# Patient Record
Sex: MALE | Race: OTHER | NOT HISPANIC OR LATINO | Employment: OTHER | URBAN - METROPOLITAN AREA
[De-identification: names, ages, dates, MRNs, and addresses within clinical notes are randomized per-mention and may not be internally consistent; named-entity substitution may affect disease eponyms.]

---

## 2023-03-15 LAB
APPEARANCE, URINE: ABNORMAL
BACTERIA, URINE: ABNORMAL /HPF
BILIRUBIN, URINE: NEGATIVE
BLOOD, URINE: NEGATIVE
COLOR, URINE: YELLOW
GLUCOSE, URINE: NEGATIVE MG/DL
KETONES, URINE: NEGATIVE MG/DL
LEUKOCYTE ESTERASE, URINE: ABNORMAL
MUCUS, URINE: ABNORMAL /LPF
NITRITE, URINE: POSITIVE
PH, URINE: 6 (ref 5–8)
PROTEIN, URINE: NEGATIVE MG/DL
RBC, URINE: 2 /HPF (ref 0–5)
SPECIFIC GRAVITY, URINE: 1.02 (ref 1–1.03)
SQUAMOUS EPITHELIAL CELLS, URINE: <1 /HPF
UROBILINOGEN, URINE: <2 MG/DL (ref 0–1.9)
WBC, URINE: 60 /HPF (ref 0–5)

## 2023-03-18 LAB — URINE CULTURE: ABNORMAL

## 2023-06-09 LAB
APPEARANCE, URINE: ABNORMAL
BILIRUBIN, URINE: NEGATIVE
BLOOD, URINE: ABNORMAL
COLOR, URINE: YELLOW
GLUCOSE, URINE: NEGATIVE MG/DL
KETONES, URINE: NEGATIVE MG/DL
LEUKOCYTE ESTERASE, URINE: ABNORMAL
MUCUS, URINE: ABNORMAL /LPF
NITRITE, URINE: NEGATIVE
PH, URINE: 5 (ref 5–8)
PROTEIN, URINE: NEGATIVE MG/DL
RBC, URINE: 3 /HPF (ref 0–5)
SPECIFIC GRAVITY, URINE: 1.02 (ref 1–1.03)
SQUAMOUS EPITHELIAL CELLS, URINE: 1 /HPF
UROBILINOGEN, URINE: <2 MG/DL (ref 0–1.9)
WBC, URINE: 29 /HPF (ref 0–5)

## 2023-06-11 LAB — URINE CULTURE: ABNORMAL

## 2023-08-02 LAB
ALANINE AMINOTRANSFERASE (SGPT) (U/L) IN SER/PLAS: 7 U/L (ref 10–52)
ALBUMIN (G/DL) IN SER/PLAS: 3 G/DL (ref 3.4–5)
ALKALINE PHOSPHATASE (U/L) IN SER/PLAS: 64 U/L (ref 33–136)
ANION GAP IN SER/PLAS: 14 MMOL/L (ref 10–20)
ASPARTATE AMINOTRANSFERASE (SGOT) (U/L) IN SER/PLAS: 13 U/L (ref 9–39)
BILIRUBIN TOTAL (MG/DL) IN SER/PLAS: 0.6 MG/DL (ref 0–1.2)
CALCIDIOL (25 OH VITAMIN D3) (NG/ML) IN SER/PLAS: 42 NG/ML
CALCIUM (MG/DL) IN SER/PLAS: 8.9 MG/DL (ref 8.6–10.3)
CARBON DIOXIDE, TOTAL (MMOL/L) IN SER/PLAS: 26 MMOL/L (ref 21–32)
CHLORIDE (MMOL/L) IN SER/PLAS: 103 MMOL/L (ref 98–107)
CREATININE (MG/DL) IN SER/PLAS: 1.31 MG/DL (ref 0.5–1.3)
ERYTHROCYTE DISTRIBUTION WIDTH (RATIO) BY AUTOMATED COUNT: 14.8 % (ref 11.5–14.5)
ERYTHROCYTE MEAN CORPUSCULAR HEMOGLOBIN CONCENTRATION (G/DL) BY AUTOMATED: 31.7 G/DL (ref 32–36)
ERYTHROCYTE MEAN CORPUSCULAR VOLUME (FL) BY AUTOMATED COUNT: 92 FL (ref 80–100)
ERYTHROCYTES (10*6/UL) IN BLOOD BY AUTOMATED COUNT: 4.25 X10E12/L (ref 4.5–5.9)
ESTIMATED AVERAGE GLUCOSE FOR HBA1C: 108 MG/DL
GFR MALE: 54 ML/MIN/1.73M2
GLUCOSE (MG/DL) IN SER/PLAS: 76 MG/DL (ref 74–99)
HEMATOCRIT (%) IN BLOOD BY AUTOMATED COUNT: 39.1 % (ref 41–52)
HEMOGLOBIN (G/DL) IN BLOOD: 12.4 G/DL (ref 13.5–17.5)
HEMOGLOBIN A1C/HEMOGLOBIN TOTAL IN BLOOD: 5.4 %
LEUKOCYTES (10*3/UL) IN BLOOD BY AUTOMATED COUNT: 6.4 X10E9/L (ref 4.4–11.3)
PLATELETS (10*3/UL) IN BLOOD AUTOMATED COUNT: 178 X10E9/L (ref 150–450)
POTASSIUM (MMOL/L) IN SER/PLAS: 3.5 MMOL/L (ref 3.5–5.3)
PROTEIN TOTAL: 6 G/DL (ref 6.4–8.2)
SODIUM (MMOL/L) IN SER/PLAS: 139 MMOL/L (ref 136–145)
THYROTROPIN (MIU/L) IN SER/PLAS BY DETECTION LIMIT <= 0.05 MIU/L: 0.53 MIU/L (ref 0.44–3.98)
UREA NITROGEN (MG/DL) IN SER/PLAS: 21 MG/DL (ref 6–23)

## 2024-02-01 ENCOUNTER — LAB REQUISITION (OUTPATIENT)
Dept: LAB | Facility: HOSPITAL | Age: 84
End: 2024-02-01
Payer: MEDICARE

## 2024-02-01 DIAGNOSIS — F02.80 DEMENTIA IN OTHER DISEASES CLASSIFIED ELSEWHERE, UNSPECIFIED SEVERITY, WITHOUT BEHAVIORAL DISTURBANCE, PSYCHOTIC DISTURBANCE, MOOD DISTURBANCE, AND ANXIETY (MULTI): ICD-10-CM

## 2024-02-01 LAB
25(OH)D3 SERPL-MCNC: 49 NG/ML (ref 30–100)
ALBUMIN SERPL BCP-MCNC: 3.5 G/DL (ref 3.4–5)
ALP SERPL-CCNC: 73 U/L (ref 33–136)
ALT SERPL W P-5'-P-CCNC: 11 U/L (ref 10–52)
ANION GAP SERPL CALC-SCNC: 15 MMOL/L (ref 10–20)
AST SERPL W P-5'-P-CCNC: 17 U/L (ref 9–39)
BILIRUB SERPL-MCNC: 0.7 MG/DL (ref 0–1.2)
BUN SERPL-MCNC: 23 MG/DL (ref 6–23)
CALCIUM SERPL-MCNC: 8.8 MG/DL (ref 8.6–10.3)
CHLORIDE SERPL-SCNC: 100 MMOL/L (ref 98–107)
CO2 SERPL-SCNC: 26 MMOL/L (ref 21–32)
CREAT SERPL-MCNC: 1.38 MG/DL (ref 0.5–1.3)
EGFRCR SERPLBLD CKD-EPI 2021: 50 ML/MIN/1.73M*2
ERYTHROCYTE [DISTWIDTH] IN BLOOD BY AUTOMATED COUNT: 13.3 % (ref 11.5–14.5)
EST. AVERAGE GLUCOSE BLD GHB EST-MCNC: 105 MG/DL
GLUCOSE SERPL-MCNC: 79 MG/DL (ref 74–99)
HBA1C MFR BLD: 5.3 %
HCT VFR BLD AUTO: 40.8 % (ref 41–52)
HGB BLD-MCNC: 13 G/DL (ref 13.5–17.5)
MCH RBC QN AUTO: 30.2 PG (ref 26–34)
MCHC RBC AUTO-ENTMCNC: 31.9 G/DL (ref 32–36)
MCV RBC AUTO: 95 FL (ref 80–100)
NRBC BLD-RTO: 0 /100 WBCS (ref 0–0)
PLATELET # BLD AUTO: 221 X10*3/UL (ref 150–450)
POTASSIUM SERPL-SCNC: 3.8 MMOL/L (ref 3.5–5.3)
PROT SERPL-MCNC: 6.6 G/DL (ref 6.4–8.2)
RBC # BLD AUTO: 4.31 X10*6/UL (ref 4.5–5.9)
SODIUM SERPL-SCNC: 137 MMOL/L (ref 136–145)
TSH SERPL-ACNC: 0.78 MIU/L (ref 0.44–3.98)
WBC # BLD AUTO: 8.8 X10*3/UL (ref 4.4–11.3)

## 2024-02-01 PROCEDURE — 82306 VITAMIN D 25 HYDROXY: CPT | Mod: OUT,GEALAB | Performed by: INTERNAL MEDICINE

## 2024-02-01 PROCEDURE — 85027 COMPLETE CBC AUTOMATED: CPT | Mod: OUT | Performed by: INTERNAL MEDICINE

## 2024-02-01 PROCEDURE — 83036 HEMOGLOBIN GLYCOSYLATED A1C: CPT | Mod: OUT,GEALAB | Performed by: INTERNAL MEDICINE

## 2024-02-01 PROCEDURE — 84443 ASSAY THYROID STIM HORMONE: CPT | Mod: OUT | Performed by: INTERNAL MEDICINE

## 2024-02-01 PROCEDURE — 80053 COMPREHEN METABOLIC PANEL: CPT | Mod: OUT | Performed by: INTERNAL MEDICINE

## 2024-08-02 ENCOUNTER — LAB REQUISITION (OUTPATIENT)
Dept: LAB | Facility: HOSPITAL | Age: 84
End: 2024-08-02
Payer: MEDICARE

## 2024-08-02 DIAGNOSIS — J44.9 CHRONIC OBSTRUCTIVE PULMONARY DISEASE, UNSPECIFIED (MULTI): ICD-10-CM

## 2024-08-02 DIAGNOSIS — I50.9 HEART FAILURE, UNSPECIFIED (MULTI): ICD-10-CM

## 2024-08-02 LAB
25(OH)D3 SERPL-MCNC: 38 NG/ML (ref 30–100)
ALBUMIN SERPL BCP-MCNC: 3.2 G/DL (ref 3.4–5)
ALP SERPL-CCNC: 71 U/L (ref 33–136)
ALT SERPL W P-5'-P-CCNC: 9 U/L (ref 10–52)
ANION GAP SERPL CALC-SCNC: 12 MMOL/L (ref 10–20)
AST SERPL W P-5'-P-CCNC: 13 U/L (ref 9–39)
BILIRUB SERPL-MCNC: 0.5 MG/DL (ref 0–1.2)
BUN SERPL-MCNC: 25 MG/DL (ref 6–23)
CALCIUM SERPL-MCNC: 8.5 MG/DL (ref 8.6–10.3)
CHLORIDE SERPL-SCNC: 104 MMOL/L (ref 98–107)
CO2 SERPL-SCNC: 26 MMOL/L (ref 21–32)
CREAT SERPL-MCNC: 1.5 MG/DL (ref 0.5–1.3)
EGFRCR SERPLBLD CKD-EPI 2021: 46 ML/MIN/1.73M*2
ERYTHROCYTE [DISTWIDTH] IN BLOOD BY AUTOMATED COUNT: 13.2 % (ref 11.5–14.5)
EST. AVERAGE GLUCOSE BLD GHB EST-MCNC: 103 MG/DL
GLUCOSE SERPL-MCNC: 72 MG/DL (ref 74–99)
HBA1C MFR BLD: 5.2 %
HCT VFR BLD AUTO: 39.4 % (ref 41–52)
HGB BLD-MCNC: 12.5 G/DL (ref 13.5–17.5)
MCH RBC QN AUTO: 29.6 PG (ref 26–34)
MCHC RBC AUTO-ENTMCNC: 31.7 G/DL (ref 32–36)
MCV RBC AUTO: 93 FL (ref 80–100)
NRBC BLD-RTO: 0 /100 WBCS (ref 0–0)
PLATELET # BLD AUTO: 251 X10*3/UL (ref 150–450)
POTASSIUM SERPL-SCNC: 4 MMOL/L (ref 3.5–5.3)
PROT SERPL-MCNC: 6 G/DL (ref 6.4–8.2)
RBC # BLD AUTO: 4.22 X10*6/UL (ref 4.5–5.9)
SODIUM SERPL-SCNC: 138 MMOL/L (ref 136–145)
TSH SERPL-ACNC: 0.53 MIU/L (ref 0.44–3.98)
WBC # BLD AUTO: 7 X10*3/UL (ref 4.4–11.3)

## 2024-08-02 PROCEDURE — 85027 COMPLETE CBC AUTOMATED: CPT | Mod: OUT | Performed by: INTERNAL MEDICINE

## 2024-08-02 PROCEDURE — 83036 HEMOGLOBIN GLYCOSYLATED A1C: CPT | Mod: OUT,GEALAB | Performed by: INTERNAL MEDICINE

## 2024-08-02 PROCEDURE — 80053 COMPREHEN METABOLIC PANEL: CPT | Mod: OUT | Performed by: INTERNAL MEDICINE

## 2024-08-02 PROCEDURE — 84443 ASSAY THYROID STIM HORMONE: CPT | Mod: OUT | Performed by: INTERNAL MEDICINE

## 2024-08-02 PROCEDURE — 82306 VITAMIN D 25 HYDROXY: CPT | Mod: OUT,GEALAB | Performed by: INTERNAL MEDICINE

## 2025-02-11 ENCOUNTER — LAB REQUISITION (OUTPATIENT)
Dept: LAB | Facility: HOSPITAL | Age: 85
End: 2025-02-11
Payer: MEDICARE

## 2025-02-11 DIAGNOSIS — N18.30 CHRONIC KIDNEY DISEASE, STAGE 3 UNSPECIFIED (MULTI): ICD-10-CM

## 2025-02-11 DIAGNOSIS — N40.0 BENIGN PROSTATIC HYPERPLASIA WITHOUT LOWER URINARY TRACT SYMPTOMS: ICD-10-CM

## 2025-02-11 LAB
ANION GAP SERPL CALC-SCNC: 11 MMOL/L (ref 10–20)
BUN SERPL-MCNC: 29 MG/DL (ref 6–23)
CALCIUM SERPL-MCNC: 8.3 MG/DL (ref 8.6–10.3)
CHLORIDE SERPL-SCNC: 109 MMOL/L (ref 98–107)
CO2 SERPL-SCNC: 25 MMOL/L (ref 21–32)
CREAT SERPL-MCNC: 1.4 MG/DL (ref 0.5–1.3)
EGFRCR SERPLBLD CKD-EPI 2021: 49 ML/MIN/1.73M*2
ERYTHROCYTE [DISTWIDTH] IN BLOOD BY AUTOMATED COUNT: 14.2 % (ref 11.5–14.5)
GLUCOSE SERPL-MCNC: 73 MG/DL (ref 74–99)
HCT VFR BLD AUTO: 37.6 % (ref 41–52)
HGB BLD-MCNC: 11.7 G/DL (ref 13.5–17.5)
MCH RBC QN AUTO: 29 PG (ref 26–34)
MCHC RBC AUTO-ENTMCNC: 31.1 G/DL (ref 32–36)
MCV RBC AUTO: 93 FL (ref 80–100)
NRBC BLD-RTO: 0 /100 WBCS (ref 0–0)
PLATELET # BLD AUTO: 199 X10*3/UL (ref 150–450)
POTASSIUM SERPL-SCNC: 4 MMOL/L (ref 3.5–5.3)
RBC # BLD AUTO: 4.04 X10*6/UL (ref 4.5–5.9)
SODIUM SERPL-SCNC: 141 MMOL/L (ref 136–145)
WBC # BLD AUTO: 4.8 X10*3/UL (ref 4.4–11.3)

## 2025-02-11 PROCEDURE — 36415 COLL VENOUS BLD VENIPUNCTURE: CPT | Mod: OUT | Performed by: INTERNAL MEDICINE

## 2025-02-11 PROCEDURE — 80048 BASIC METABOLIC PNL TOTAL CA: CPT | Mod: OUT | Performed by: INTERNAL MEDICINE

## 2025-02-11 PROCEDURE — 85027 COMPLETE CBC AUTOMATED: CPT | Mod: OUT | Performed by: INTERNAL MEDICINE

## 2025-02-12 ENCOUNTER — LAB REQUISITION (OUTPATIENT)
Dept: LAB | Facility: HOSPITAL | Age: 85
End: 2025-02-12
Payer: MEDICARE

## 2025-02-12 DIAGNOSIS — N39.0 URINARY TRACT INFECTION, SITE NOT SPECIFIED: ICD-10-CM

## 2025-02-12 LAB
APPEARANCE UR: ABNORMAL
BILIRUB UR STRIP.AUTO-MCNC: NEGATIVE MG/DL
COLOR UR: COLORLESS
GLUCOSE UR STRIP.AUTO-MCNC: NORMAL MG/DL
KETONES UR STRIP.AUTO-MCNC: NEGATIVE MG/DL
LEUKOCYTE ESTERASE UR QL STRIP.AUTO: ABNORMAL
MUCOUS THREADS #/AREA URNS AUTO: NORMAL /LPF
NITRITE UR QL STRIP.AUTO: NEGATIVE
PH UR STRIP.AUTO: 7 [PH]
PROT UR STRIP.AUTO-MCNC: NEGATIVE MG/DL
RBC # UR STRIP.AUTO: NEGATIVE MG/DL
RBC #/AREA URNS AUTO: NORMAL /HPF
SP GR UR STRIP.AUTO: 1.01
SQUAMOUS #/AREA URNS AUTO: NORMAL /HPF
UROBILINOGEN UR STRIP.AUTO-MCNC: NORMAL MG/DL
WBC #/AREA URNS AUTO: NORMAL /HPF
WBC CLUMPS #/AREA URNS AUTO: NORMAL /HPF

## 2025-02-12 PROCEDURE — 81001 URINALYSIS AUTO W/SCOPE: CPT

## 2025-02-12 PROCEDURE — 81001 URINALYSIS AUTO W/SCOPE: CPT | Mod: OUT | Performed by: INTERNAL MEDICINE

## 2025-02-12 PROCEDURE — 87086 URINE CULTURE/COLONY COUNT: CPT | Mod: OUT,GEALAB | Performed by: INTERNAL MEDICINE

## 2025-02-12 PROCEDURE — 87186 SC STD MICRODIL/AGAR DIL: CPT

## 2025-02-12 PROCEDURE — 87086 URINE CULTURE/COLONY COUNT: CPT

## 2025-02-14 LAB — BACTERIA UR CULT: ABNORMAL

## 2025-03-08 ENCOUNTER — APPOINTMENT (OUTPATIENT)
Dept: RADIOLOGY | Facility: HOSPITAL | Age: 85
End: 2025-03-08
Payer: MEDICARE

## 2025-03-08 ENCOUNTER — HOSPITAL ENCOUNTER (INPATIENT)
Facility: HOSPITAL | Age: 85
End: 2025-03-08
Attending: STUDENT IN AN ORGANIZED HEALTH CARE EDUCATION/TRAINING PROGRAM | Admitting: STUDENT IN AN ORGANIZED HEALTH CARE EDUCATION/TRAINING PROGRAM
Payer: MEDICARE

## 2025-03-08 DIAGNOSIS — F03.918 DEMENTIA WITH BEHAVIORAL DISTURBANCE (MULTI): ICD-10-CM

## 2025-03-08 DIAGNOSIS — B96.20 E-COLI UTI: ICD-10-CM

## 2025-03-08 DIAGNOSIS — S72.002A CLOSED FRACTURE OF NECK OF LEFT FEMUR, INITIAL ENCOUNTER: ICD-10-CM

## 2025-03-08 DIAGNOSIS — N39.0 E-COLI UTI: ICD-10-CM

## 2025-03-08 DIAGNOSIS — I50.42 CHRONIC COMBINED SYSTOLIC AND DIASTOLIC CONGESTIVE HEART FAILURE: ICD-10-CM

## 2025-03-08 DIAGNOSIS — S72.002A CLOSED LEFT HIP FRACTURE, INITIAL ENCOUNTER: Primary | ICD-10-CM

## 2025-03-08 PROBLEM — R33.9 CHRONIC RETENTION OF URINE: Status: ACTIVE | Noted: 2025-03-08

## 2025-03-08 PROBLEM — I25.10 ATHEROSCLEROSIS OF CORONARY ARTERY: Status: ACTIVE | Noted: 2025-03-08

## 2025-03-08 PROBLEM — N13.8 BENIGN PROSTATIC HYPERPLASIA WITH URINARY OBSTRUCTION: Status: ACTIVE | Noted: 2025-03-08

## 2025-03-08 PROBLEM — J44.9 CHRONIC OBSTRUCTIVE PULMONARY DISEASE (MULTI): Status: ACTIVE | Noted: 2025-03-08

## 2025-03-08 PROBLEM — R13.10 DIFFICULTY IN SWALLOWING: Status: ACTIVE | Noted: 2025-03-08

## 2025-03-08 PROBLEM — D64.9 ANEMIA: Status: ACTIVE | Noted: 2025-03-08

## 2025-03-08 PROBLEM — F41.9 ANXIETY: Status: ACTIVE | Noted: 2025-03-08

## 2025-03-08 PROBLEM — N18.32 STAGE 3B CHRONIC KIDNEY DISEASE (MULTI): Status: ACTIVE | Noted: 2021-06-28

## 2025-03-08 PROBLEM — N40.1 BENIGN PROSTATIC HYPERPLASIA WITH URINARY OBSTRUCTION: Status: ACTIVE | Noted: 2025-03-08

## 2025-03-08 PROBLEM — A41.9 SEPSIS (MULTI): Status: RESOLVED | Noted: 2025-03-08 | Resolved: 2025-03-08

## 2025-03-08 PROBLEM — K83.09 ACUTE CHOLANGITIS (CMS-HCC): Status: RESOLVED | Noted: 2020-06-07 | Resolved: 2025-03-08

## 2025-03-08 PROBLEM — J18.9 PNEUMONIA: Status: ACTIVE | Noted: 2025-03-08

## 2025-03-08 PROBLEM — E78.5 HYPERLIPIDEMIA: Status: ACTIVE | Noted: 2025-03-08

## 2025-03-08 PROBLEM — I10 BENIGN HYPERTENSION: Status: ACTIVE | Noted: 2025-03-08

## 2025-03-08 PROBLEM — K92.2 GASTROINTESTINAL HEMORRHAGE: Status: RESOLVED | Noted: 2025-03-08 | Resolved: 2025-03-08

## 2025-03-08 LAB
ALBUMIN SERPL BCP-MCNC: 2.9 G/DL (ref 3.4–5)
ALP SERPL-CCNC: 99 U/L (ref 33–136)
ALT SERPL W P-5'-P-CCNC: 48 U/L (ref 10–52)
ANION GAP SERPL CALC-SCNC: 14 MMOL/L (ref 10–20)
APPEARANCE UR: ABNORMAL
AST SERPL W P-5'-P-CCNC: 53 U/L (ref 9–39)
BACTERIA #/AREA URNS AUTO: ABNORMAL /HPF
BASOPHILS # BLD AUTO: 0.02 X10*3/UL (ref 0–0.1)
BASOPHILS NFR BLD AUTO: 0.2 %
BILIRUB SERPL-MCNC: 0.8 MG/DL (ref 0–1.2)
BILIRUB UR STRIP.AUTO-MCNC: NEGATIVE MG/DL
BUN SERPL-MCNC: 41 MG/DL (ref 6–23)
CALCIUM SERPL-MCNC: 8.7 MG/DL (ref 8.6–10.3)
CARDIAC TROPONIN I PNL SERPL HS: 45 NG/L (ref 0–20)
CARDIAC TROPONIN I PNL SERPL HS: 48 NG/L (ref 0–20)
CHLORIDE SERPL-SCNC: 100 MMOL/L (ref 98–107)
CO2 SERPL-SCNC: 26 MMOL/L (ref 21–32)
COLOR UR: ABNORMAL
CREAT SERPL-MCNC: 1.72 MG/DL (ref 0.5–1.3)
EGFRCR SERPLBLD CKD-EPI 2021: 38 ML/MIN/1.73M*2
EOSINOPHIL # BLD AUTO: 0.06 X10*3/UL (ref 0–0.4)
EOSINOPHIL NFR BLD AUTO: 0.7 %
ERYTHROCYTE [DISTWIDTH] IN BLOOD BY AUTOMATED COUNT: 13.9 % (ref 11.5–14.5)
GLUCOSE SERPL-MCNC: 107 MG/DL (ref 74–99)
GLUCOSE UR STRIP.AUTO-MCNC: NORMAL MG/DL
HCT VFR BLD AUTO: 40.8 % (ref 41–52)
HGB BLD-MCNC: 13.4 G/DL (ref 13.5–17.5)
IMM GRANULOCYTES # BLD AUTO: 0.07 X10*3/UL (ref 0–0.5)
IMM GRANULOCYTES NFR BLD AUTO: 0.8 % (ref 0–0.9)
KETONES UR STRIP.AUTO-MCNC: NEGATIVE MG/DL
LACTATE SERPL-SCNC: 1.3 MMOL/L (ref 0.4–2)
LEUKOCYTE ESTERASE UR QL STRIP.AUTO: ABNORMAL
LYMPHOCYTES # BLD AUTO: 0.71 X10*3/UL (ref 0.8–3)
LYMPHOCYTES NFR BLD AUTO: 7.7 %
MCH RBC QN AUTO: 28.5 PG (ref 26–34)
MCHC RBC AUTO-ENTMCNC: 32.8 G/DL (ref 32–36)
MCV RBC AUTO: 87 FL (ref 80–100)
MONOCYTES # BLD AUTO: 1.02 X10*3/UL (ref 0.05–0.8)
MONOCYTES NFR BLD AUTO: 11.1 %
NEUTROPHILS # BLD AUTO: 7.31 X10*3/UL (ref 1.6–5.5)
NEUTROPHILS NFR BLD AUTO: 79.5 %
NITRITE UR QL STRIP.AUTO: NEGATIVE
NRBC BLD-RTO: 0 /100 WBCS (ref 0–0)
PH UR STRIP.AUTO: 7 [PH]
PLATELET # BLD AUTO: 376 X10*3/UL (ref 150–450)
POTASSIUM SERPL-SCNC: 3.4 MMOL/L (ref 3.5–5.3)
PROT SERPL-MCNC: 6.8 G/DL (ref 6.4–8.2)
PROT UR STRIP.AUTO-MCNC: ABNORMAL MG/DL
RBC # BLD AUTO: 4.7 X10*6/UL (ref 4.5–5.9)
RBC # UR STRIP.AUTO: ABNORMAL MG/DL
RBC #/AREA URNS AUTO: ABNORMAL /HPF
SODIUM SERPL-SCNC: 137 MMOL/L (ref 136–145)
SP GR UR STRIP.AUTO: 1.02
SQUAMOUS #/AREA URNS AUTO: ABNORMAL /HPF
UROBILINOGEN UR STRIP.AUTO-MCNC: NORMAL MG/DL
WBC # BLD AUTO: 9.2 X10*3/UL (ref 4.4–11.3)
WBC #/AREA URNS AUTO: >50 /HPF
WBC CLUMPS #/AREA URNS AUTO: ABNORMAL /HPF

## 2025-03-08 PROCEDURE — 72170 X-RAY EXAM OF PELVIS: CPT

## 2025-03-08 PROCEDURE — 85025 COMPLETE CBC W/AUTO DIFF WBC: CPT | Performed by: STUDENT IN AN ORGANIZED HEALTH CARE EDUCATION/TRAINING PROGRAM

## 2025-03-08 PROCEDURE — 87449 NOS EACH ORGANISM AG IA: CPT | Mod: GEALAB | Performed by: STUDENT IN AN ORGANIZED HEALTH CARE EDUCATION/TRAINING PROGRAM

## 2025-03-08 PROCEDURE — 71045 X-RAY EXAM CHEST 1 VIEW: CPT | Performed by: RADIOLOGY

## 2025-03-08 PROCEDURE — 2500000002 HC RX 250 W HCPCS SELF ADMINISTERED DRUGS (ALT 637 FOR MEDICARE OP, ALT 636 FOR OP/ED): Performed by: STUDENT IN AN ORGANIZED HEALTH CARE EDUCATION/TRAINING PROGRAM

## 2025-03-08 PROCEDURE — 72128 CT CHEST SPINE W/O DYE: CPT | Mod: RCN | Performed by: RADIOLOGY

## 2025-03-08 PROCEDURE — 2500000004 HC RX 250 GENERAL PHARMACY W/ HCPCS (ALT 636 FOR OP/ED): Performed by: STUDENT IN AN ORGANIZED HEALTH CARE EDUCATION/TRAINING PROGRAM

## 2025-03-08 PROCEDURE — 73090 X-RAY EXAM OF FOREARM: CPT | Mod: RT

## 2025-03-08 PROCEDURE — 72125 CT NECK SPINE W/O DYE: CPT | Performed by: RADIOLOGY

## 2025-03-08 PROCEDURE — 73552 X-RAY EXAM OF FEMUR 2/>: CPT | Mod: LT

## 2025-03-08 PROCEDURE — 99285 EMERGENCY DEPT VISIT HI MDM: CPT | Mod: 25 | Performed by: STUDENT IN AN ORGANIZED HEALTH CARE EDUCATION/TRAINING PROGRAM

## 2025-03-08 PROCEDURE — 72170 X-RAY EXAM OF PELVIS: CPT | Mod: LEFT SIDE | Performed by: RADIOLOGY

## 2025-03-08 PROCEDURE — 36415 COLL VENOUS BLD VENIPUNCTURE: CPT | Performed by: STUDENT IN AN ORGANIZED HEALTH CARE EDUCATION/TRAINING PROGRAM

## 2025-03-08 PROCEDURE — 72128 CT CHEST SPINE W/O DYE: CPT | Mod: RCN

## 2025-03-08 PROCEDURE — 70450 CT HEAD/BRAIN W/O DYE: CPT

## 2025-03-08 PROCEDURE — 84484 ASSAY OF TROPONIN QUANT: CPT | Performed by: STUDENT IN AN ORGANIZED HEALTH CARE EDUCATION/TRAINING PROGRAM

## 2025-03-08 PROCEDURE — 1100000001 HC PRIVATE ROOM DAILY

## 2025-03-08 PROCEDURE — 72131 CT LUMBAR SPINE W/O DYE: CPT | Mod: RCN | Performed by: RADIOLOGY

## 2025-03-08 PROCEDURE — 2500000001 HC RX 250 WO HCPCS SELF ADMINISTERED DRUGS (ALT 637 FOR MEDICARE OP): Performed by: STUDENT IN AN ORGANIZED HEALTH CARE EDUCATION/TRAINING PROGRAM

## 2025-03-08 PROCEDURE — 99223 1ST HOSP IP/OBS HIGH 75: CPT | Performed by: STUDENT IN AN ORGANIZED HEALTH CARE EDUCATION/TRAINING PROGRAM

## 2025-03-08 PROCEDURE — 80053 COMPREHEN METABOLIC PANEL: CPT | Performed by: STUDENT IN AN ORGANIZED HEALTH CARE EDUCATION/TRAINING PROGRAM

## 2025-03-08 PROCEDURE — 73090 X-RAY EXAM OF FOREARM: CPT | Mod: RIGHT SIDE | Performed by: RADIOLOGY

## 2025-03-08 PROCEDURE — 83605 ASSAY OF LACTIC ACID: CPT | Performed by: STUDENT IN AN ORGANIZED HEALTH CARE EDUCATION/TRAINING PROGRAM

## 2025-03-08 PROCEDURE — 81003 URINALYSIS AUTO W/O SCOPE: CPT | Performed by: STUDENT IN AN ORGANIZED HEALTH CARE EDUCATION/TRAINING PROGRAM

## 2025-03-08 PROCEDURE — 72131 CT LUMBAR SPINE W/O DYE: CPT | Mod: RCN

## 2025-03-08 PROCEDURE — 87086 URINE CULTURE/COLONY COUNT: CPT | Mod: GEALAB | Performed by: STUDENT IN AN ORGANIZED HEALTH CARE EDUCATION/TRAINING PROGRAM

## 2025-03-08 PROCEDURE — 87899 AGENT NOS ASSAY W/OPTIC: CPT | Mod: GEALAB | Performed by: STUDENT IN AN ORGANIZED HEALTH CARE EDUCATION/TRAINING PROGRAM

## 2025-03-08 PROCEDURE — 70450 CT HEAD/BRAIN W/O DYE: CPT | Performed by: RADIOLOGY

## 2025-03-08 PROCEDURE — 74176 CT ABD & PELVIS W/O CONTRAST: CPT | Performed by: RADIOLOGY

## 2025-03-08 PROCEDURE — 94667 MNPJ CHEST WALL 1ST: CPT

## 2025-03-08 PROCEDURE — 9420000001 HC RT PATIENT EDUCATION 5 MIN

## 2025-03-08 PROCEDURE — 71250 CT THORAX DX C-: CPT

## 2025-03-08 PROCEDURE — 81001 URINALYSIS AUTO W/SCOPE: CPT | Performed by: STUDENT IN AN ORGANIZED HEALTH CARE EDUCATION/TRAINING PROGRAM

## 2025-03-08 PROCEDURE — 94760 N-INVAS EAR/PLS OXIMETRY 1: CPT

## 2025-03-08 PROCEDURE — 72125 CT NECK SPINE W/O DYE: CPT

## 2025-03-08 PROCEDURE — 73552 X-RAY EXAM OF FEMUR 2/>: CPT | Mod: LEFT SIDE | Performed by: RADIOLOGY

## 2025-03-08 PROCEDURE — 71045 X-RAY EXAM CHEST 1 VIEW: CPT

## 2025-03-08 PROCEDURE — 71250 CT THORAX DX C-: CPT | Performed by: RADIOLOGY

## 2025-03-08 RX ORDER — POTASSIUM CHLORIDE 750 MG/1
1 TABLET, EXTENDED RELEASE ORAL DAILY
Status: ON HOLD | COMMUNITY

## 2025-03-08 RX ORDER — ACETAMINOPHEN 325 MG/1
975 TABLET ORAL ONCE
Status: COMPLETED | OUTPATIENT
Start: 2025-03-08 | End: 2025-03-08

## 2025-03-08 RX ORDER — FINASTERIDE 5 MG/1
5 TABLET, FILM COATED ORAL DAILY
Status: DISPENSED | OUTPATIENT
Start: 2025-03-08

## 2025-03-08 RX ORDER — PANTOPRAZOLE SODIUM 40 MG/10ML
40 INJECTION, POWDER, LYOPHILIZED, FOR SOLUTION INTRAVENOUS DAILY
Status: ACTIVE | OUTPATIENT
Start: 2025-03-09

## 2025-03-08 RX ORDER — TRAZODONE HYDROCHLORIDE 50 MG/1
50 TABLET ORAL NIGHTLY
Status: DISPENSED | OUTPATIENT
Start: 2025-03-08

## 2025-03-08 RX ORDER — FLUOXETINE 10 MG/1
10 CAPSULE ORAL DAILY
Status: ON HOLD | COMMUNITY
Start: 2025-01-20

## 2025-03-08 RX ORDER — ATORVASTATIN CALCIUM 40 MG/1
40 TABLET, FILM COATED ORAL DAILY
Status: ON HOLD | COMMUNITY
Start: 2017-01-30

## 2025-03-08 RX ORDER — AMOXICILLIN 250 MG
2 CAPSULE ORAL NIGHTLY
Status: DISPENSED | OUTPATIENT
Start: 2025-03-08

## 2025-03-08 RX ORDER — ONDANSETRON HYDROCHLORIDE 2 MG/ML
4 INJECTION, SOLUTION INTRAVENOUS EVERY 6 HOURS PRN
Status: ACTIVE | OUTPATIENT
Start: 2025-03-08

## 2025-03-08 RX ORDER — FAMOTIDINE 20 MG/1
10 TABLET, FILM COATED ORAL DAILY PRN
Status: ACTIVE | OUTPATIENT
Start: 2025-03-08

## 2025-03-08 RX ORDER — HEPARIN SODIUM 5000 [USP'U]/ML
5000 INJECTION, SOLUTION INTRAVENOUS; SUBCUTANEOUS EVERY 8 HOURS
Status: COMPLETED | OUTPATIENT
Start: 2025-03-08 | End: 2025-03-09

## 2025-03-08 RX ORDER — ACETAMINOPHEN 500 MG
5 TABLET ORAL NIGHTLY PRN
Status: ACTIVE | OUTPATIENT
Start: 2025-03-08

## 2025-03-08 RX ORDER — TAMSULOSIN HYDROCHLORIDE 0.4 MG/1
0.4 CAPSULE ORAL DAILY
Status: ON HOLD | COMMUNITY

## 2025-03-08 RX ORDER — CEFTRIAXONE 2 G/50ML
2 INJECTION, SOLUTION INTRAVENOUS EVERY 24 HOURS
Status: DISPENSED | OUTPATIENT
Start: 2025-03-08

## 2025-03-08 RX ORDER — TRAZODONE HYDROCHLORIDE 50 MG/1
50 TABLET ORAL NIGHTLY
Status: ON HOLD | COMMUNITY
Start: 2025-02-24

## 2025-03-08 RX ORDER — FUROSEMIDE 40 MG/1
40 TABLET ORAL DAILY
Status: ON HOLD | COMMUNITY
Start: 2025-03-06

## 2025-03-08 RX ORDER — ACETAMINOPHEN 325 MG/1
975 TABLET ORAL 3 TIMES DAILY
Status: DISPENSED | OUTPATIENT
Start: 2025-03-08

## 2025-03-08 RX ORDER — AZITHROMYCIN 250 MG/1
500 TABLET, FILM COATED ORAL
Status: DISPENSED | OUTPATIENT
Start: 2025-03-08 | End: 2025-03-11

## 2025-03-08 RX ORDER — ATORVASTATIN CALCIUM 40 MG/1
40 TABLET, FILM COATED ORAL DAILY
Status: DISPENSED | OUTPATIENT
Start: 2025-03-08

## 2025-03-08 RX ORDER — IPRATROPIUM BROMIDE AND ALBUTEROL SULFATE 2.5; .5 MG/3ML; MG/3ML
3 SOLUTION RESPIRATORY (INHALATION) EVERY 2 HOUR PRN
Status: ACTIVE | OUTPATIENT
Start: 2025-03-08

## 2025-03-08 RX ORDER — FINASTERIDE 5 MG/1
5 TABLET, FILM COATED ORAL DAILY
Status: ON HOLD | COMMUNITY

## 2025-03-08 RX ORDER — POTASSIUM CHLORIDE 1.5 G/1.58G
40 POWDER, FOR SOLUTION ORAL ONCE
Status: COMPLETED | OUTPATIENT
Start: 2025-03-08 | End: 2025-03-08

## 2025-03-08 RX ORDER — IPRATROPIUM BROMIDE AND ALBUTEROL SULFATE 2.5; .5 MG/3ML; MG/3ML
3 SOLUTION RESPIRATORY (INHALATION) EVERY 6 HOURS PRN
Status: DISCONTINUED | OUTPATIENT
Start: 2025-03-08 | End: 2025-03-08

## 2025-03-08 RX ORDER — AMOXICILLIN 250 MG
2 CAPSULE ORAL NIGHTLY PRN
Status: DISCONTINUED | OUTPATIENT
Start: 2025-03-08 | End: 2025-03-08

## 2025-03-08 RX ORDER — FLUOXETINE 10 MG/1
10 CAPSULE ORAL DAILY
Status: DISPENSED | OUTPATIENT
Start: 2025-03-08

## 2025-03-08 RX ORDER — TAMSULOSIN HYDROCHLORIDE 0.4 MG/1
0.4 CAPSULE ORAL DAILY
Status: DISPENSED | OUTPATIENT
Start: 2025-03-08

## 2025-03-08 RX ADMIN — SENNOSIDES AND DOCUSATE SODIUM 2 TABLET: 50; 8.6 TABLET ORAL at 20:18

## 2025-03-08 RX ADMIN — FINASTERIDE 5 MG: 5 TABLET, FILM COATED ORAL at 17:02

## 2025-03-08 RX ADMIN — AZITHROMYCIN 500 MG: 250 TABLET, FILM COATED ORAL at 20:18

## 2025-03-08 RX ADMIN — ACETAMINOPHEN 975 MG: 325 TABLET ORAL at 20:18

## 2025-03-08 RX ADMIN — POTASSIUM CHLORIDE 40 MEQ: 1.5 POWDER, FOR SOLUTION ORAL at 13:19

## 2025-03-08 RX ADMIN — ATORVASTATIN CALCIUM 40 MG: 40 TABLET, FILM COATED ORAL at 20:19

## 2025-03-08 RX ADMIN — ACETAMINOPHEN 975 MG: 325 TABLET ORAL at 11:51

## 2025-03-08 RX ADMIN — SODIUM CHLORIDE, SODIUM LACTATE, POTASSIUM CHLORIDE, AND CALCIUM CHLORIDE 1000 ML: .6; .31; .03; .02 INJECTION, SOLUTION INTRAVENOUS at 17:01

## 2025-03-08 RX ADMIN — HEPARIN SODIUM 5000 UNITS: 5000 INJECTION, SOLUTION INTRAVENOUS; SUBCUTANEOUS at 18:48

## 2025-03-08 RX ADMIN — TAMSULOSIN HYDROCHLORIDE 0.4 MG: 0.4 CAPSULE ORAL at 17:02

## 2025-03-08 RX ADMIN — CEFTRIAXONE SODIUM 2 G: 2 INJECTION, SOLUTION INTRAVENOUS at 20:18

## 2025-03-08 RX ADMIN — TRAZODONE HYDROCHLORIDE 50 MG: 50 TABLET ORAL at 20:19

## 2025-03-08 SDOH — SOCIAL STABILITY: SOCIAL INSECURITY: WITHIN THE LAST YEAR, HAVE YOU BEEN HUMILIATED OR EMOTIONALLY ABUSED IN OTHER WAYS BY YOUR PARTNER OR EX-PARTNER?: NO

## 2025-03-08 SDOH — SOCIAL STABILITY: SOCIAL INSECURITY: ABUSE: ADULT

## 2025-03-08 SDOH — SOCIAL STABILITY: SOCIAL INSECURITY: HAS ANYONE EVER THREATENED TO HURT YOUR FAMILY OR YOUR PETS?: NO

## 2025-03-08 SDOH — SOCIAL STABILITY: SOCIAL INSECURITY
WITHIN THE LAST YEAR, HAVE YOU BEEN KICKED, HIT, SLAPPED, OR OTHERWISE PHYSICALLY HURT BY YOUR PARTNER OR EX-PARTNER?: NO

## 2025-03-08 SDOH — ECONOMIC STABILITY: FOOD INSECURITY: HOW HARD IS IT FOR YOU TO PAY FOR THE VERY BASICS LIKE FOOD, HOUSING, MEDICAL CARE, AND HEATING?: NOT VERY HARD

## 2025-03-08 SDOH — SOCIAL STABILITY: SOCIAL INSECURITY: WITHIN THE LAST YEAR, HAVE YOU BEEN AFRAID OF YOUR PARTNER OR EX-PARTNER?: NO

## 2025-03-08 SDOH — SOCIAL STABILITY: SOCIAL INSECURITY
WITHIN THE LAST YEAR, HAVE YOU BEEN RAPED OR FORCED TO HAVE ANY KIND OF SEXUAL ACTIVITY BY YOUR PARTNER OR EX-PARTNER?: NO

## 2025-03-08 SDOH — ECONOMIC STABILITY: FOOD INSECURITY: WITHIN THE PAST 12 MONTHS, THE FOOD YOU BOUGHT JUST DIDN'T LAST AND YOU DIDN'T HAVE MONEY TO GET MORE.: NEVER TRUE

## 2025-03-08 SDOH — SOCIAL STABILITY: SOCIAL INSECURITY: ARE YOU OR HAVE YOU BEEN THREATENED OR ABUSED PHYSICALLY, EMOTIONALLY, OR SEXUALLY BY ANYONE?: NO

## 2025-03-08 SDOH — ECONOMIC STABILITY: INCOME INSECURITY: IN THE PAST 12 MONTHS HAS THE ELECTRIC, GAS, OIL, OR WATER COMPANY THREATENED TO SHUT OFF SERVICES IN YOUR HOME?: NO

## 2025-03-08 SDOH — HEALTH STABILITY: MENTAL HEALTH
DO YOU FEEL STRESS - TENSE, RESTLESS, NERVOUS, OR ANXIOUS, OR UNABLE TO SLEEP AT NIGHT BECAUSE YOUR MIND IS TROUBLED ALL THE TIME - THESE DAYS?: NOT AT ALL

## 2025-03-08 SDOH — SOCIAL STABILITY: SOCIAL INSECURITY: WERE YOU ABLE TO COMPLETE ALL THE BEHAVIORAL HEALTH SCREENINGS?: YES

## 2025-03-08 SDOH — ECONOMIC STABILITY: FOOD INSECURITY: WITHIN THE PAST 12 MONTHS, YOU WORRIED THAT YOUR FOOD WOULD RUN OUT BEFORE YOU GOT THE MONEY TO BUY MORE.: NEVER TRUE

## 2025-03-08 SDOH — SOCIAL STABILITY: SOCIAL INSECURITY: ARE THERE ANY APPARENT SIGNS OF INJURIES/BEHAVIORS THAT COULD BE RELATED TO ABUSE/NEGLECT?: NO

## 2025-03-08 SDOH — SOCIAL STABILITY: SOCIAL INSECURITY: DO YOU FEEL ANYONE HAS EXPLOITED OR TAKEN ADVANTAGE OF YOU FINANCIALLY OR OF YOUR PERSONAL PROPERTY?: NO

## 2025-03-08 SDOH — ECONOMIC STABILITY: HOUSING INSECURITY: IN THE PAST 12 MONTHS, HOW MANY TIMES HAVE YOU MOVED WHERE YOU WERE LIVING?: 0

## 2025-03-08 SDOH — SOCIAL STABILITY: SOCIAL INSECURITY: HAVE YOU HAD THOUGHTS OF HARMING ANYONE ELSE?: NO

## 2025-03-08 SDOH — ECONOMIC STABILITY: HOUSING INSECURITY: IN THE LAST 12 MONTHS, WAS THERE A TIME WHEN YOU WERE NOT ABLE TO PAY THE MORTGAGE OR RENT ON TIME?: NO

## 2025-03-08 SDOH — ECONOMIC STABILITY: HOUSING INSECURITY: AT ANY TIME IN THE PAST 12 MONTHS, WERE YOU HOMELESS OR LIVING IN A SHELTER (INCLUDING NOW)?: NO

## 2025-03-08 SDOH — SOCIAL STABILITY: SOCIAL INSECURITY: DOES ANYONE TRY TO KEEP YOU FROM HAVING/CONTACTING OTHER FRIENDS OR DOING THINGS OUTSIDE YOUR HOME?: NO

## 2025-03-08 SDOH — SOCIAL STABILITY: SOCIAL INSECURITY: HAVE YOU HAD ANY THOUGHTS OF HARMING ANYONE ELSE?: NO

## 2025-03-08 SDOH — SOCIAL STABILITY: SOCIAL INSECURITY: DO YOU FEEL UNSAFE GOING BACK TO THE PLACE WHERE YOU ARE LIVING?: NO

## 2025-03-08 SDOH — ECONOMIC STABILITY: TRANSPORTATION INSECURITY: IN THE PAST 12 MONTHS, HAS LACK OF TRANSPORTATION KEPT YOU FROM MEDICAL APPOINTMENTS OR FROM GETTING MEDICATIONS?: NO

## 2025-03-08 ASSESSMENT — COGNITIVE AND FUNCTIONAL STATUS - GENERAL
TURNING FROM BACK TO SIDE WHILE IN FLAT BAD: A LOT
STANDING UP FROM CHAIR USING ARMS: A LOT
DRESSING REGULAR LOWER BODY CLOTHING: TOTAL
STANDING UP FROM CHAIR USING ARMS: TOTAL
CLIMB 3 TO 5 STEPS WITH RAILING: A LOT
MOVING TO AND FROM BED TO CHAIR: A LOT
DRESSING REGULAR UPPER BODY CLOTHING: TOTAL
PATIENT BASELINE BEDBOUND: NO
MOVING FROM LYING ON BACK TO SITTING ON SIDE OF FLAT BED WITH BEDRAILS: A LOT
EATING MEALS: A LOT
DRESSING REGULAR UPPER BODY CLOTHING: A LOT
CLIMB 3 TO 5 STEPS WITH RAILING: TOTAL
MOBILITY SCORE: 8
TOILETING: TOTAL
EATING MEALS: A LOT
HELP NEEDED FOR BATHING: A LOT
PERSONAL GROOMING: A LOT
MOBILITY SCORE: 12
MOVING TO AND FROM BED TO CHAIR: TOTAL
DAILY ACTIVITIY SCORE: 12
PERSONAL GROOMING: A LOT
DAILY ACTIVITIY SCORE: 8
WALKING IN HOSPITAL ROOM: A LOT
HELP NEEDED FOR BATHING: TOTAL
TOILETING: A LOT
DRESSING REGULAR LOWER BODY CLOTHING: A LOT
TURNING FROM BACK TO SIDE WHILE IN FLAT BAD: A LOT
WALKING IN HOSPITAL ROOM: TOTAL
MOVING FROM LYING ON BACK TO SITTING ON SIDE OF FLAT BED WITH BEDRAILS: A LOT

## 2025-03-08 ASSESSMENT — PAIN SCALES - PAIN ASSESSMENT IN ADVANCED DEMENTIA (PAINAD)
FACIALEXPRESSION: SMILING OR INEXPRESSIVE
BODYLANGUAGE: RELAXED
CONSOLABILITY: NO NEED TO CONSOLE
TOTALSCORE: 0
BREATHING: NORMAL

## 2025-03-08 ASSESSMENT — ACTIVITIES OF DAILY LIVING (ADL)
WALKS IN HOME: NEEDS ASSISTANCE
DRESSING YOURSELF: NEEDS ASSISTANCE
LACK_OF_TRANSPORTATION: NO
HEARING - LEFT EAR: FUNCTIONAL
FEEDING YOURSELF: NEEDS ASSISTANCE
LACK_OF_TRANSPORTATION: PATIENT UNABLE TO ANSWER
ASSISTIVE_DEVICE: WHEELCHAIR
LACK_OF_TRANSPORTATION: NO
ADEQUATE_TO_COMPLETE_ADL: YES
GROOMING: NEEDS ASSISTANCE
BATHING: NEEDS ASSISTANCE
TOILETING: NEEDS ASSISTANCE
JUDGMENT_ADEQUATE_SAFELY_COMPLETE_DAILY_ACTIVITIES: NO
HEARING - RIGHT EAR: FUNCTIONAL
PATIENT'S MEMORY ADEQUATE TO SAFELY COMPLETE DAILY ACTIVITIES?: NO

## 2025-03-08 ASSESSMENT — LIFESTYLE VARIABLES
HOW OFTEN DO YOU HAVE 6 OR MORE DRINKS ON ONE OCCASION: NEVER
AUDIT-C TOTAL SCORE: 0
SKIP TO QUESTIONS 9-10: 1
HOW OFTEN DO YOU HAVE A DRINK CONTAINING ALCOHOL: NEVER
AUDIT-C TOTAL SCORE: 0
HOW MANY STANDARD DRINKS CONTAINING ALCOHOL DO YOU HAVE ON A TYPICAL DAY: PATIENT DOES NOT DRINK

## 2025-03-08 ASSESSMENT — PAIN - FUNCTIONAL ASSESSMENT
PAIN_FUNCTIONAL_ASSESSMENT: UNABLE TO SELF-REPORT
PAIN_FUNCTIONAL_ASSESSMENT: UNABLE TO SELF-REPORT

## 2025-03-08 ASSESSMENT — PATIENT HEALTH QUESTIONNAIRE - PHQ9
2. FEELING DOWN, DEPRESSED OR HOPELESS: NOT AT ALL
1. LITTLE INTEREST OR PLEASURE IN DOING THINGS: NOT AT ALL
SUM OF ALL RESPONSES TO PHQ9 QUESTIONS 1 & 2: 0

## 2025-03-08 NOTE — ED PROVIDER NOTES
CC: No chief complaint on file.     HPI:  Patient is a 84-year-old male from the memory care unit at [facility] presenting the emergency department with sudden onset of left lower leg pain. He is alert and oriented times himself which is at baseline.  He is full code.  There denies any injury or trauma per EMS.  Did not fall did not hit his head.  Patient is on tramadol for chronic pain per chart review.      Brother states he has been having pain for 7-10 days per NH but he saw him Saturday and he seemed fine.  Brother states that he has fallen multiple times most recently in the last 6 weeks where he fell and hit his head out of the wheelchair.  He was not evaluated at the emergency department at that time.    Records Reviewed:  Recent available ED and inpatient notes reviewed in EMR.    PMHx/PSHx:  Per HPI.   - has no past medical history on file.  - has a past surgical history that includes CT guided imaging for needle placement (12/7/2017).  - does not have a problem list on file.    Medications:  Reviewed in EMR. See EMR for complete list of medications and doses.    Allergies:  Patient has no known allergies.    Social History:  - Tobacco:  has no history on file for tobacco use.   - Alcohol:  has no history on file for alcohol use.   - Illicit Drugs:  has no history on file for drug use.     ROS:  Per HPI.       ???????????????????????????????????????????????????????????????  Triage Vitals:  T    HR    BP    RR    O2        Physical Exam  ???????????????????????????????????????????????????????????????  GEN: in acute distress  HEAD: atraumatic  EYES: PERRL, EOMI, no scleral icterus  ENT: mmm, no rhinorrhea, uvula midline  NECK: no midline C-spine tenderness  CVS/CHEST: reg rate, nl rhythm  PULM: CTA b/l no wheezes, crackles, or rhonchi   GI: soft, NT/ND, no rebound or guarding   BACK: no vertebral point tenderness  EXT: Left lower extremity is shortened and externally rotated, patient does  have pain with palpation of left hip, 2+ periph pulses in bilat radial and DP   NEURO: Awake and alert to self at baseline.  Moving extremities spontaneously.  SKIN: warm, dry, excoriations in the genitals.      Assessment and Plan:  Patient is a 84-year-old male from the memory care unit at [facility] presenting the emergency department with sudden onset of left lower leg pain.  It appears that patient had duplex lower extremity arterial and venous scans ordered as well as x-rays.  I cannot see the results in our system.  However patient is neurovascularly intact.  He is alert and oriented times himself which is at baseline.  He is full code.  There denies any injury or trauma per EMS.  Did not fall did not hit his head.  Patient is on tramadol for chronic pain per chart review.  He is not on anticoagulation.  He does take a daily baby aspirin.  Patient's left leg does appear shortened and externally rotated.  Concern for fracture.  He is neurovascularly intact and I have a low suspicion for acute arterial thrombus or DVT based on physical exam.  There is no swelling.  There is no redness.  There is no warmth.  Will start with x-rays and advanced workup as needed.  Will give acetaminophen for pain relief.  Awaiting for brother to arrive for further history taking.  As history is significantly limited given his advanced dementia.    Given the history of falls by his brother at bedside and inability to obtain a reliable exam will pan scan.  Confirmed CODE STATUS with patient's brother at bedside he is full code.    See ED course.    Admitted to medicine with orthopedics on consult for fixation of his femoral neck fracture.    ED Course:  ED Course as of 03/09/25 0926   Sat Mar 08, 2025   1233 X-ray reviewed by me consistent with left femur fracture.  Reach out to Dr. Buckley who will place on consult.  Patient will require admission for operative repair. [HD]   1249 Dr. Marcio rivera with consultation.   Patient will be admitted to medicine for medical clearance in OR tomorrow.  N.p.o. at midnight. [HD]   1250 IMPRESSION:  Displaced subcapital fracture of the left femoral neck.   [HD]   1406 IMPRESSION:  No evidence of an acute intracranial process.   [HD]   1407 Within limitations of imaging no other acute fractures identified.   [HD]      ED Course User Index  [HD] Meredith Fang DO         Diagnoses as of 03/09/25 0926   Closed fracture of neck of left femur, initial encounter   Closed left hip fracture, initial encounter       Disposition:  admitted    Meredith Fang DO      Procedures ? SmartLinks last updated 3/8/2025 11:20 AM        Meredith Fang DO  03/09/25 0928

## 2025-03-08 NOTE — CARE PLAN
The patient's goals for the shift include  pain management     The clinical goals for the shift include  pain management

## 2025-03-08 NOTE — H&P
[facility]  Department of Hospital Medicine    HISTORY AND PHYSICAL    Chief Complaint   Patient presents with    Leg Pain     Pt sent in from NH with C/O left leg pain. On arrival Pt has left leg shorting and outward rotation with pain on movement.        History Of Present Illness  Jabari Dumas is an 84 y.o. male with dementia, sick sinus syndrome status post PPM, hypertension, hyperlipidemia, CAD status post CABG 2017, chronic diastolic heart failure, COPD, CKD 3A, BPH with history of urinary retention, GERD and PUD complicated by remote GI bleed.  He presented to the [facility] ED from his memory care unit due to sudden onset left leg pain.  Family reported he has had multiple recent falls.  Vitals in the ED showed hypotension and hypoxia.  He was noted to have a shortened, externally rotated left leg.  Labs showed improvement in chronic anemia, ARAM, hypokalemia, and elevated troponin stable on repeat.  Full body motion-degraded CT imaging showed bilateral lower lobe pneumonia with mucous plugging, displaced left femoral neck fracture, possible pelvic fractures, possible distal right radius fracture as well as several incidental findings including various cysts, thyroid enlargement, esophageal fluid, hiatal hernia, staghorn calculus in the left kidney, and a bladder stone.     At time of admission, denies pain.  Per family at bedside, unlikely to get a meaningful history from him.  Has had recent falls.  Was having left leg pain and had a workup at the nursing facility 1 week ago including x-ray, which did not show any fracture at that time.     Past Medical History  He has no past medical history on file.    Surgical History  He has a past surgical history that includes CT guided imaging for needle placement (12/7/2017).     Social History  He has no history on file for tobacco use, alcohol use, and drug use.    Family History  No family history on file.      Allergies  Patient has no known allergies.    Review of systems  Limited by mentation.    Physical Exam   CON: awake, alert, moderate distress;   EYES: conjunctiva wnl; PERRL; EOMI  ENMT: hearing intact; dry mouth;   NECK: symmetric; thyroid and cervical nodes wnl;   CV: S1 S2 - RRR with no m/g/r; no lower extremity edema; normal JVP; symmetric pulses  RESP: normal work of breathing on room air; lungs CTAB;   GI: abdomen nontender; no organomegaly;   SKIN: no lesions or rashes; no induration;   MSK: Left leg shortened and externally rotated; left hip tender to palpation; digits wnl;   NEURO: language and speech wnl; sensation and motor function grossly intact   PSYCH: disoriented to situation; affect pleasant;     Last Recorded Vitals  Blood pressure 118/58, pulse 85, temperature 36.5 °C (97.7 °F), temperature source Temporal, resp. rate 18, height 1.829 m (6'), weight 79.4 kg (175 lb), SpO2 (!) 86%.    Relevant Results  Lab Results   Component Value Date    WBC 9.2 03/08/2025    HGB 13.4 (L) 03/08/2025    HCT 40.8 (L) 03/08/2025    MCV 87 03/08/2025     03/08/2025      Lab Results   Component Value Date    GLUCOSE 107 (H) 03/08/2025    CALCIUM 8.7 03/08/2025     03/08/2025    K 3.4 (L) 03/08/2025    CO2 26 03/08/2025     03/08/2025    BUN 41 (H) 03/08/2025    CREATININE 1.72 (H) 03/08/2025        Scheduled medications:  heparin, 5,000 Units, subcutaneous, q8h      Continuous medications:     PRN medications:  PRN medications: famotidine, melatonin, ondansetron, oxygen, sennosides-docusate sodium                Assessment/Plan   Assessment & Plan  Closed left hip fracture, initial encounter    Chronic retention of urine    Stage 3b chronic kidney disease (Multi)    Pneumonia    Peptic ulcer disease    Benign hypertension    Hyperlipidemia    Dementia with behavioral disturbance (Multi)    Difficulty in swallowing    Chronic obstructive pulmonary disease (Multi)    Chronic combined systolic and  diastolic congestive heart failure    Benign prostatic hyperplasia with urinary obstruction    Atherosclerosis of coronary artery    Anemia    Jabari Dumas is an 84 y.o. male with dementia, sick sinus syndrome status post PPM, hypertension, hyperlipidemia, CAD status post CABG 2017, chronic diastolic heart failure, COPD, CKD 3A, BPH with history of urinary retention, GERD and PUD complicated by remote GI bleed.  He presented to the [facility] ED from his memory care unit due to sudden onset left leg pain.  Family reported he has had multiple recent falls.  Vitals in the ED showed hypotension and hypoxia.  He was noted to have a shortened, externally rotated left leg.  Labs showed improvement in chronic anemia, ARAM, hypokalemia, and elevated troponin stable on repeat.  Full body motion-degraded CT imaging showed bilateral lower lobe pneumonia with mucous plugging, displaced left femoral neck fracture, possible pelvic fractures, possible distal right radius fracture as well as several incidental findings including various cysts, thyroid enlargement, esophageal fluid, hiatal hernia, staghorn calculus in the left kidney, and a bladder stone.     Acute displaced left femoral neck fracture:  Non-MI troponin elevation: Due to above  > RCRI = 2 (10 % risk of major cardiac event, for prior MI and CHF; per review of incomplete records, likely had a type II MI at the time of his GI bleed and does have elevated troponin on admission). Unable to perform > 4 METS.  Echo in 2022 with normal EF, diastolic dysfunction, no significant valve abnormalities. Pt is moderate risk for moderate risk surgery. Pt is optimized for surgery, although will reach out to cardiology for input as well given cardiac history.   -orthopedics consulted  -pain mgmt and bowel regimen   -PT/OT   -cardiology consulted    Bibasilar pneumonia: Suspicious for aspiration pneumonia  COPD:  -Ceftriaxone and azithromycin to cover gram-negative  pneumonia  -Respiratory culture, Legionella and strep antigens, procalcitonin pending  -Bronchial hygiene, incentive spirometry, DuoNebs  -SLP consulted  -Oxygen, wean as tolerated    ARAM on CKD 3A:  Chronic diastolic CHF: Appears dry to euvolemic  Hypokalemia:  -Hold home Lasix  -Cautious IV hydration; monitor volume status  -Trend renal function and electrolytes; replace potassium    CAD:  Hyperlipidemia:  -Continue statin perioperatively    Dementia:  -Continue fluoxetine, trazodone    BPH with history of urinary retention:  Multiple asymptomatic renal calculi:  -Continue finasteride, tamsulosin  -Urinalysis pending  -Bladder scans to assess for urinary retention    GERD:  History of PUD:  Esophageal reflux and hiatal hernia on CT:  -PPI    Anemia:  -Trend CBC    DVT PPx: Subcu heparin; hold morning of surgery    Dispo: Inpatient, likely 3 to 4 days anticipating need for skilled placement postoperatively         Greg Singh MD    Patient Name:  Jabari Dumas   MRN:   [id number]   Room/Bed:  [facility]

## 2025-03-09 ENCOUNTER — APPOINTMENT (OUTPATIENT)
Dept: RADIOLOGY | Facility: HOSPITAL | Age: 85
End: 2025-03-09
Payer: MEDICARE

## 2025-03-09 ENCOUNTER — ANESTHESIA EVENT (OUTPATIENT)
Dept: OPERATING ROOM | Facility: HOSPITAL | Age: 85
End: 2025-03-09
Payer: MEDICARE

## 2025-03-09 ENCOUNTER — ANESTHESIA (OUTPATIENT)
Dept: OPERATING ROOM | Facility: HOSPITAL | Age: 85
End: 2025-03-09
Payer: MEDICARE

## 2025-03-09 VITALS
HEART RATE: 67 BPM | HEIGHT: 72 IN | RESPIRATION RATE: 15 BRPM | BODY MASS INDEX: 26.07 KG/M2 | OXYGEN SATURATION: 95 % | WEIGHT: 192.46 LBS | TEMPERATURE: 97.3 F | SYSTOLIC BLOOD PRESSURE: 121 MMHG | DIASTOLIC BLOOD PRESSURE: 59 MMHG

## 2025-03-09 PROBLEM — S72.002A CLOSED FRACTURE OF NECK OF LEFT FEMUR: Status: ACTIVE | Noted: 2025-03-08

## 2025-03-09 LAB
ABO GROUP (TYPE) IN BLOOD: NORMAL
ALBUMIN SERPL BCP-MCNC: 2.6 G/DL (ref 3.4–5)
ANION GAP SERPL CALC-SCNC: 11 MMOL/L (ref 10–20)
ANTIBODY SCREEN: NORMAL
BUN SERPL-MCNC: 36 MG/DL (ref 6–23)
CALCIUM SERPL-MCNC: 8.5 MG/DL (ref 8.6–10.3)
CARDIAC TROPONIN I PNL SERPL HS: 41 NG/L (ref 0–20)
CHLORIDE SERPL-SCNC: 103 MMOL/L (ref 98–107)
CO2 SERPL-SCNC: 26 MMOL/L (ref 21–32)
CREAT SERPL-MCNC: 1.48 MG/DL (ref 0.5–1.3)
EGFRCR SERPLBLD CKD-EPI 2021: 46 ML/MIN/1.73M*2
ERYTHROCYTE [DISTWIDTH] IN BLOOD BY AUTOMATED COUNT: 14 % (ref 11.5–14.5)
GLUCOSE SERPL-MCNC: 105 MG/DL (ref 74–99)
HCT VFR BLD AUTO: 35.2 % (ref 41–52)
HGB BLD-MCNC: 12 G/DL (ref 13.5–17.5)
HOLD SPECIMEN: NORMAL
INR PPP: 1.2 (ref 0.9–1.1)
MAGNESIUM SERPL-MCNC: 2.16 MG/DL (ref 1.6–2.4)
MCH RBC QN AUTO: 28.8 PG (ref 26–34)
MCHC RBC AUTO-ENTMCNC: 34.1 G/DL (ref 32–36)
MCV RBC AUTO: 85 FL (ref 80–100)
NRBC BLD-RTO: 0 /100 WBCS (ref 0–0)
PHOSPHATE SERPL-MCNC: 3.5 MG/DL (ref 2.5–4.9)
PLATELET # BLD AUTO: 344 X10*3/UL (ref 150–450)
POTASSIUM SERPL-SCNC: 3.5 MMOL/L (ref 3.5–5.3)
PROCALCITONIN SERPL-MCNC: 0.11 NG/ML
PROTHROMBIN TIME: 13.2 SECONDS (ref 9.8–12.4)
RBC # BLD AUTO: 4.16 X10*6/UL (ref 4.5–5.9)
RH FACTOR (ANTIGEN D): NORMAL
SODIUM SERPL-SCNC: 136 MMOL/L (ref 136–145)
WBC # BLD AUTO: 8.4 X10*3/UL (ref 4.4–11.3)

## 2025-03-09 PROCEDURE — A9999 DME SUPPLY OR ACCESSORY, NOS: HCPCS | Performed by: ORTHOPAEDIC SURGERY

## 2025-03-09 PROCEDURE — 84145 PROCALCITONIN (PCT): CPT | Mod: GEALAB | Performed by: STUDENT IN AN ORGANIZED HEALTH CARE EDUCATION/TRAINING PROGRAM

## 2025-03-09 PROCEDURE — 3700000002 HC GENERAL ANESTHESIA TIME - EACH INCREMENTAL 1 MINUTE: Performed by: ORTHOPAEDIC SURGERY

## 2025-03-09 PROCEDURE — 2720000007 HC OR 272 NO HCPCS: Performed by: ORTHOPAEDIC SURGERY

## 2025-03-09 PROCEDURE — 36415 COLL VENOUS BLD VENIPUNCTURE: CPT | Performed by: STUDENT IN AN ORGANIZED HEALTH CARE EDUCATION/TRAINING PROGRAM

## 2025-03-09 PROCEDURE — 3600000005 HC OR TIME - INITIAL BASE CHARGE - PROCEDURE LEVEL FIVE: Performed by: ORTHOPAEDIC SURGERY

## 2025-03-09 PROCEDURE — P9045 ALBUMIN (HUMAN), 5%, 250 ML: HCPCS | Mod: JZ | Performed by: NURSE ANESTHETIST, CERTIFIED REGISTERED

## 2025-03-09 PROCEDURE — 2500000004 HC RX 250 GENERAL PHARMACY W/ HCPCS (ALT 636 FOR OP/ED): Performed by: ANESTHESIOLOGY

## 2025-03-09 PROCEDURE — 0SRS01Z REPLACEMENT OF LEFT HIP JOINT, FEMORAL SURFACE WITH METAL SYNTHETIC SUBSTITUTE, OPEN APPROACH: ICD-10-PCS | Performed by: ORTHOPAEDIC SURGERY

## 2025-03-09 PROCEDURE — 76000 FLUOROSCOPY <1 HR PHYS/QHP: CPT

## 2025-03-09 PROCEDURE — A27236 PR FEMORAL FX, OPEN TX: Performed by: NURSE ANESTHETIST, CERTIFIED REGISTERED

## 2025-03-09 PROCEDURE — 87077 CULTURE AEROBIC IDENTIFY: CPT | Mod: GEALAB | Performed by: STUDENT IN AN ORGANIZED HEALTH CARE EDUCATION/TRAINING PROGRAM

## 2025-03-09 PROCEDURE — 80069 RENAL FUNCTION PANEL: CPT | Performed by: STUDENT IN AN ORGANIZED HEALTH CARE EDUCATION/TRAINING PROGRAM

## 2025-03-09 PROCEDURE — 1100000001 HC PRIVATE ROOM DAILY

## 2025-03-09 PROCEDURE — 2780000003 HC OR 278 NO HCPCS: Performed by: ORTHOPAEDIC SURGERY

## 2025-03-09 PROCEDURE — 2500000004 HC RX 250 GENERAL PHARMACY W/ HCPCS (ALT 636 FOR OP/ED): Performed by: STUDENT IN AN ORGANIZED HEALTH CARE EDUCATION/TRAINING PROGRAM

## 2025-03-09 PROCEDURE — 3600000010 HC OR TIME - EACH INCREMENTAL 1 MINUTE - PROCEDURE LEVEL FIVE: Performed by: ORTHOPAEDIC SURGERY

## 2025-03-09 PROCEDURE — 2500000005 HC RX 250 GENERAL PHARMACY W/O HCPCS: Performed by: ORTHOPAEDIC SURGERY

## 2025-03-09 PROCEDURE — 84100 ASSAY OF PHOSPHORUS: CPT | Performed by: STUDENT IN AN ORGANIZED HEALTH CARE EDUCATION/TRAINING PROGRAM

## 2025-03-09 PROCEDURE — A6213 FOAM DRG >16<=48 SQ IN W/BDR: HCPCS | Performed by: ORTHOPAEDIC SURGERY

## 2025-03-09 PROCEDURE — 84484 ASSAY OF TROPONIN QUANT: CPT | Performed by: STUDENT IN AN ORGANIZED HEALTH CARE EDUCATION/TRAINING PROGRAM

## 2025-03-09 PROCEDURE — 99223 1ST HOSP IP/OBS HIGH 75: CPT | Performed by: ORTHOPAEDIC SURGERY

## 2025-03-09 PROCEDURE — 86900 BLOOD TYPING SEROLOGIC ABO: CPT | Performed by: STUDENT IN AN ORGANIZED HEALTH CARE EDUCATION/TRAINING PROGRAM

## 2025-03-09 PROCEDURE — 72170 X-RAY EXAM OF PELVIS: CPT | Performed by: RADIOLOGY

## 2025-03-09 PROCEDURE — 3700000001 HC GENERAL ANESTHESIA TIME - INITIAL BASE CHARGE: Performed by: ORTHOPAEDIC SURGERY

## 2025-03-09 PROCEDURE — 99100 ANES PT EXTEME AGE<1 YR&>70: CPT | Performed by: ANESTHESIOLOGY

## 2025-03-09 PROCEDURE — 27236 TREAT THIGH FRACTURE: CPT | Performed by: ORTHOPAEDIC SURGERY

## 2025-03-09 PROCEDURE — 2500000004 HC RX 250 GENERAL PHARMACY W/ HCPCS (ALT 636 FOR OP/ED): Performed by: ORTHOPAEDIC SURGERY

## 2025-03-09 PROCEDURE — 85027 COMPLETE CBC AUTOMATED: CPT | Performed by: STUDENT IN AN ORGANIZED HEALTH CARE EDUCATION/TRAINING PROGRAM

## 2025-03-09 PROCEDURE — A27236 PR FEMORAL FX, OPEN TX: Performed by: ANESTHESIOLOGY

## 2025-03-09 PROCEDURE — 72170 X-RAY EXAM OF PELVIS: CPT

## 2025-03-09 PROCEDURE — 2500000001 HC RX 250 WO HCPCS SELF ADMINISTERED DRUGS (ALT 637 FOR MEDICARE OP): Performed by: ORTHOPAEDIC SURGERY

## 2025-03-09 PROCEDURE — 2500000004 HC RX 250 GENERAL PHARMACY W/ HCPCS (ALT 636 FOR OP/ED): Performed by: NURSE ANESTHETIST, CERTIFIED REGISTERED

## 2025-03-09 PROCEDURE — C1776 JOINT DEVICE (IMPLANTABLE): HCPCS | Performed by: ORTHOPAEDIC SURGERY

## 2025-03-09 PROCEDURE — 83735 ASSAY OF MAGNESIUM: CPT | Performed by: STUDENT IN AN ORGANIZED HEALTH CARE EDUCATION/TRAINING PROGRAM

## 2025-03-09 PROCEDURE — 7100000001 HC RECOVERY ROOM TIME - INITIAL BASE CHARGE: Performed by: ORTHOPAEDIC SURGERY

## 2025-03-09 PROCEDURE — 85610 PROTHROMBIN TIME: CPT | Performed by: STUDENT IN AN ORGANIZED HEALTH CARE EDUCATION/TRAINING PROGRAM

## 2025-03-09 PROCEDURE — 7100000002 HC RECOVERY ROOM TIME - EACH INCREMENTAL 1 MINUTE: Performed by: ORTHOPAEDIC SURGERY

## 2025-03-09 DEVICE — BIPOLAR COMPONENT
Type: IMPLANTABLE DEVICE | Site: HIP | Status: FUNCTIONAL
Brand: UHR

## 2025-03-09 DEVICE — V40 FEMORAL HEAD
Type: IMPLANTABLE DEVICE | Site: HIP | Status: FUNCTIONAL
Brand: V40 HEAD

## 2025-03-09 DEVICE — HIP STEM - STANDARD OFFSET
Type: IMPLANTABLE DEVICE | Site: HIP | Status: FUNCTIONAL
Brand: INSIGNIA

## 2025-03-09 RX ORDER — ROCURONIUM BROMIDE 10 MG/ML
INJECTION, SOLUTION INTRAVENOUS AS NEEDED
Status: DISCONTINUED | OUTPATIENT
Start: 2025-03-09 | End: 2025-03-09

## 2025-03-09 RX ORDER — ONDANSETRON HYDROCHLORIDE 2 MG/ML
4 INJECTION, SOLUTION INTRAVENOUS ONCE AS NEEDED
Status: COMPLETED | OUTPATIENT
Start: 2025-03-09 | End: 2025-03-09

## 2025-03-09 RX ORDER — PHENYLEPHRINE HCL IN 0.9% NACL 0.4MG/10ML
SYRINGE (ML) INTRAVENOUS AS NEEDED
Status: DISCONTINUED | OUTPATIENT
Start: 2025-03-09 | End: 2025-03-09

## 2025-03-09 RX ORDER — SODIUM CHLORIDE, SODIUM LACTATE, POTASSIUM CHLORIDE, CALCIUM CHLORIDE 600; 310; 30; 20 MG/100ML; MG/100ML; MG/100ML; MG/100ML
INJECTION, SOLUTION INTRAVENOUS CONTINUOUS PRN
Status: DISCONTINUED | OUTPATIENT
Start: 2025-03-09 | End: 2025-03-09

## 2025-03-09 RX ORDER — LIDOCAINE HYDROCHLORIDE 20 MG/ML
INJECTION, SOLUTION INFILTRATION; PERINEURAL AS NEEDED
Status: DISCONTINUED | OUTPATIENT
Start: 2025-03-09 | End: 2025-03-09

## 2025-03-09 RX ORDER — DIPHENHYDRAMINE HYDROCHLORIDE 50 MG/ML
12.5 INJECTION INTRAMUSCULAR; INTRAVENOUS ONCE AS NEEDED
Status: DISCONTINUED | OUTPATIENT
Start: 2025-03-09 | End: 2025-03-09 | Stop reason: HOSPADM

## 2025-03-09 RX ORDER — SODIUM CHLORIDE 0.9 G/100ML
INJECTION, SOLUTION IRRIGATION AS NEEDED
Status: DISCONTINUED | OUTPATIENT
Start: 2025-03-09 | End: 2025-03-09 | Stop reason: HOSPADM

## 2025-03-09 RX ORDER — ACETAMINOPHEN 10 MG/ML
1000 INJECTION, SOLUTION INTRAVENOUS ONCE
Status: COMPLETED | OUTPATIENT
Start: 2025-03-09 | End: 2025-03-09

## 2025-03-09 RX ORDER — MEPERIDINE HYDROCHLORIDE 25 MG/ML
12.5 INJECTION INTRAMUSCULAR; INTRAVENOUS; SUBCUTANEOUS EVERY 10 MIN PRN
Status: DISCONTINUED | OUTPATIENT
Start: 2025-03-09 | End: 2025-03-09 | Stop reason: HOSPADM

## 2025-03-09 RX ORDER — CEFAZOLIN 1 G/1
INJECTION, POWDER, FOR SOLUTION INTRAVENOUS AS NEEDED
Status: DISCONTINUED | OUTPATIENT
Start: 2025-03-09 | End: 2025-03-09

## 2025-03-09 RX ORDER — OXYCODONE HYDROCHLORIDE 5 MG/1
5 TABLET ORAL EVERY 4 HOURS PRN
Status: DISCONTINUED | OUTPATIENT
Start: 2025-03-09 | End: 2025-03-09 | Stop reason: HOSPADM

## 2025-03-09 RX ORDER — DROPERIDOL 2.5 MG/ML
0.62 INJECTION, SOLUTION INTRAMUSCULAR; INTRAVENOUS ONCE AS NEEDED
Status: DISCONTINUED | OUTPATIENT
Start: 2025-03-09 | End: 2025-03-09 | Stop reason: HOSPADM

## 2025-03-09 RX ORDER — HEPARIN SODIUM 5000 [USP'U]/ML
5000 INJECTION, SOLUTION INTRAVENOUS; SUBCUTANEOUS EVERY 8 HOURS
Status: ACTIVE | OUTPATIENT
Start: 2025-03-10

## 2025-03-09 RX ORDER — ALBUMIN HUMAN 50 G/1000ML
SOLUTION INTRAVENOUS AS NEEDED
Status: DISCONTINUED | OUTPATIENT
Start: 2025-03-09 | End: 2025-03-09

## 2025-03-09 RX ORDER — FENTANYL CITRATE 50 UG/ML
INJECTION, SOLUTION INTRAMUSCULAR; INTRAVENOUS AS NEEDED
Status: DISCONTINUED | OUTPATIENT
Start: 2025-03-09 | End: 2025-03-09

## 2025-03-09 RX ORDER — ALBUTEROL SULFATE 0.83 MG/ML
2.5 SOLUTION RESPIRATORY (INHALATION) ONCE AS NEEDED
Status: DISCONTINUED | OUTPATIENT
Start: 2025-03-09 | End: 2025-03-09 | Stop reason: HOSPADM

## 2025-03-09 RX ORDER — CEFAZOLIN SODIUM 2 G/50ML
2 SOLUTION INTRAVENOUS EVERY 8 HOURS
Status: DISPENSED | OUTPATIENT
Start: 2025-03-09 | End: 2025-03-10

## 2025-03-09 RX ORDER — SODIUM CHLORIDE, SODIUM LACTATE, POTASSIUM CHLORIDE, CALCIUM CHLORIDE 600; 310; 30; 20 MG/100ML; MG/100ML; MG/100ML; MG/100ML
100 INJECTION, SOLUTION INTRAVENOUS CONTINUOUS
Status: DISCONTINUED | OUTPATIENT
Start: 2025-03-09 | End: 2025-03-09 | Stop reason: HOSPADM

## 2025-03-09 RX ADMIN — Medication 80 MCG: at 10:24

## 2025-03-09 RX ADMIN — Medication 80 MCG: at 10:21

## 2025-03-09 RX ADMIN — LIDOCAINE HYDROCHLORIDE 20 MG: 20 INJECTION, SOLUTION INFILTRATION; PERINEURAL at 08:40

## 2025-03-09 RX ADMIN — HYDROMORPHONE HYDROCHLORIDE 0.25 MG: 1 INJECTION, SOLUTION INTRAMUSCULAR; INTRAVENOUS; SUBCUTANEOUS at 11:15

## 2025-03-09 RX ADMIN — SODIUM CHLORIDE, POTASSIUM CHLORIDE, SODIUM LACTATE AND CALCIUM CHLORIDE: 600; 310; 30; 20 INJECTION, SOLUTION INTRAVENOUS at 09:00

## 2025-03-09 RX ADMIN — CEFAZOLIN 2 G: 1 INJECTION, POWDER, FOR SOLUTION INTRAMUSCULAR; INTRAVENOUS at 08:51

## 2025-03-09 RX ADMIN — HYDROMORPHONE HYDROCHLORIDE 0.25 MG: 1 INJECTION, SOLUTION INTRAMUSCULAR; INTRAVENOUS; SUBCUTANEOUS at 11:21

## 2025-03-09 RX ADMIN — ONDANSETRON 4 MG: 2 INJECTION, SOLUTION INTRAMUSCULAR; INTRAVENOUS at 11:34

## 2025-03-09 RX ADMIN — ACETAMINOPHEN 1000 MG: 10 INJECTION, SOLUTION INTRAVENOUS at 11:54

## 2025-03-09 RX ADMIN — ALBUMIN HUMAN 250 ML: 0.05 INJECTION, SOLUTION INTRAVENOUS at 10:23

## 2025-03-09 RX ADMIN — ROCURONIUM BROMIDE 50 MG: 10 INJECTION, SOLUTION INTRAVENOUS at 08:40

## 2025-03-09 RX ADMIN — Medication 80 MCG: at 10:17

## 2025-03-09 RX ADMIN — Medication 80 MCG: at 10:12

## 2025-03-09 RX ADMIN — CEFAZOLIN SODIUM 2 G: 2 SOLUTION INTRAVENOUS at 16:39

## 2025-03-09 RX ADMIN — SUGAMMADEX 100 MG: 100 INJECTION, SOLUTION INTRAVENOUS at 10:15

## 2025-03-09 RX ADMIN — HYDROMORPHONE HYDROCHLORIDE 0.1 MG: 1 INJECTION, SOLUTION INTRAMUSCULAR; INTRAVENOUS; SUBCUTANEOUS at 23:49

## 2025-03-09 RX ADMIN — HEPARIN SODIUM 5000 UNITS: 5000 INJECTION, SOLUTION INTRAVENOUS; SUBCUTANEOUS at 03:04

## 2025-03-09 RX ADMIN — FENTANYL CITRATE 50 MCG: 50 INJECTION, SOLUTION INTRAMUSCULAR; INTRAVENOUS at 09:12

## 2025-03-09 RX ADMIN — LIDOCAINE HYDROCHLORIDE 60 MG: 20 INJECTION, SOLUTION INFILTRATION; PERINEURAL at 09:00

## 2025-03-09 RX ADMIN — ACETAMINOPHEN 975 MG: 325 TABLET ORAL at 16:53

## 2025-03-09 RX ADMIN — FENTANYL CITRATE 50 MCG: 50 INJECTION, SOLUTION INTRAMUSCULAR; INTRAVENOUS at 08:48

## 2025-03-09 RX ADMIN — ROCURONIUM BROMIDE 20 MG: 10 INJECTION, SOLUTION INTRAVENOUS at 09:13

## 2025-03-09 RX ADMIN — CEFTRIAXONE SODIUM 2 G: 2 INJECTION, SOLUTION INTRAVENOUS at 20:41

## 2025-03-09 RX ADMIN — Medication 80 MCG: at 10:15

## 2025-03-09 SDOH — HEALTH STABILITY: MENTAL HEALTH: CURRENT SMOKER: 0

## 2025-03-09 ASSESSMENT — COGNITIVE AND FUNCTIONAL STATUS - GENERAL
EATING MEALS: A LOT
TURNING FROM BACK TO SIDE WHILE IN FLAT BAD: A LOT
DAILY ACTIVITIY SCORE: 8
CLIMB 3 TO 5 STEPS WITH RAILING: TOTAL
MOVING FROM LYING ON BACK TO SITTING ON SIDE OF FLAT BED WITH BEDRAILS: A LOT
STANDING UP FROM CHAIR USING ARMS: TOTAL
PERSONAL GROOMING: TOTAL
STANDING UP FROM CHAIR USING ARMS: TOTAL
MOVING TO AND FROM BED TO CHAIR: TOTAL
TOILETING: TOTAL
HELP NEEDED FOR BATHING: A LOT
WALKING IN HOSPITAL ROOM: TOTAL
CLIMB 3 TO 5 STEPS WITH RAILING: TOTAL
DRESSING REGULAR LOWER BODY CLOTHING: TOTAL
PERSONAL GROOMING: A LOT
DRESSING REGULAR UPPER BODY CLOTHING: TOTAL
MOBILITY SCORE: 8
HELP NEEDED FOR BATHING: TOTAL
MOVING FROM LYING ON BACK TO SITTING ON SIDE OF FLAT BED WITH BEDRAILS: A LOT
TURNING FROM BACK TO SIDE WHILE IN FLAT BAD: A LOT
DRESSING REGULAR UPPER BODY CLOTHING: A LOT
DAILY ACTIVITIY SCORE: 9
EATING MEALS: A LOT
TOILETING: TOTAL
WALKING IN HOSPITAL ROOM: TOTAL
DRESSING REGULAR LOWER BODY CLOTHING: TOTAL
MOVING TO AND FROM BED TO CHAIR: TOTAL
MOBILITY SCORE: 8

## 2025-03-09 ASSESSMENT — PAIN SCALES - PAIN ASSESSMENT IN ADVANCED DEMENTIA (PAINAD)
CONSOLABILITY: UNABLE TO CONSOLE, DISTRACT OR REASSURE
BREATHING: NORMAL
FACIALEXPRESSION: SMILING OR INEXPRESSIVE
TOTALSCORE: 1
CONSOLABILITY: DISTRACTED OR REASSURED BY VOICE/TOUCH
FACIALEXPRESSION: SAD, FRIGHTENED, FROWN
CONSOLABILITY: NO NEED TO CONSOLE
BREATHING: NORMAL
TOTALSCORE: 5
TOTALSCORE: MEDICATION (SEE MAR)
FACIALEXPRESSION: SAD, FRIGHTENED, FROWN
TOTALSCORE: 2
TOTALSCORE: MEDICATION (SEE MAR)
TOTALSCORE: COLD APPLIED
NEGVOCALIZATION: OCCASIONAL MOAN/GROAN, LOW SPEECH, NEGATIVE/DISAPPROVING QUALITY
BREATHING: NORMAL
FACIALEXPRESSION: SAD, FRIGHTENED, FROWN
FACIALEXPRESSION: SAD, FRIGHTENED, FROWN
NEGVOCALIZATION: OCCASIONAL MOAN/GROAN, LOW SPEECH, NEGATIVE/DISAPPROVING QUALITY
BODYLANGUAGE: RELAXED
TOTALSCORE: 5
TOTALSCORE: 1
BODYLANGUAGE: RELAXED
TOTALSCORE: MD NOTIFIED (COMMENT)
BREATHING: NORMAL
TOTALSCORE: 4
BODYLANGUAGE: RELAXED
BREATHING: NORMAL
FACIALEXPRESSION: SMILING OR INEXPRESSIVE
NEGVOCALIZATION: REPEATED TROUBLED CALLING OUT, LOUD MOANING/GROANING, CRYING
BODYLANGUAGE: RELAXED
BODYLANGUAGE: RELAXED
CONSOLABILITY: NO NEED TO CONSOLE
CONSOLABILITY: UNABLE TO CONSOLE, DISTRACT OR REASSURE
CONSOLABILITY: NO NEED TO CONSOLE
CONSOLABILITY: DISTRACTED OR REASSURED BY VOICE/TOUCH
CONSOLABILITY: DISTRACTED OR REASSURED BY VOICE/TOUCH
NEGVOCALIZATION: OCCASIONAL MOAN/GROAN, LOW SPEECH, NEGATIVE/DISAPPROVING QUALITY
NEGVOCALIZATION: OCCASIONAL MOAN/GROAN, LOW SPEECH, NEGATIVE/DISAPPROVING QUALITY
CONSOLABILITY: DISTRACTED OR REASSURED BY VOICE/TOUCH
TOTALSCORE: MEDICATION (SEE MAR)
BREATHING: NORMAL
BODYLANGUAGE: RELAXED
NEGVOCALIZATION: REPEATED TROUBLED CALLING OUT, LOUD MOANING/GROANING, CRYING
BREATHING: NORMAL
BODYLANGUAGE: RELAXED
CONSOLABILITY: DISTRACTED OR REASSURED BY VOICE/TOUCH
FACIALEXPRESSION: SAD, FRIGHTENED, FROWN
NEGVOCALIZATION: OCCASIONAL MOAN/GROAN, LOW SPEECH, NEGATIVE/DISAPPROVING QUALITY
TOTALSCORE: 1
BREATHING: OCCASIONAL LABORED BREATHING, SHORT PERIOD OF HYPERVENTILATION
BODYLANGUAGE: RELAXED
TOTALSCORE: 0
BODYLANGUAGE: TENSE, DISTRESSED PACING, FIDGETING
FACIALEXPRESSION: SMILING OR INEXPRESSIVE
NEGVOCALIZATION: OCCASIONAL MOAN/GROAN, LOW SPEECH, NEGATIVE/DISAPPROVING QUALITY
BODYLANGUAGE: TENSE, DISTRESSED PACING, FIDGETING
BODYLANGUAGE: RELAXED
TOTALSCORE: 5
CONSOLABILITY: DISTRACTED OR REASSURED BY VOICE/TOUCH
BREATHING: NORMAL
TOTALSCORE: 2
FACIALEXPRESSION: SMILING OR INEXPRESSIVE
BREATHING: NORMAL
TOTALSCORE: 3
CONSOLABILITY: DISTRACTED OR REASSURED BY VOICE/TOUCH
TOTALSCORE: 3
FACIALEXPRESSION: SMILING OR INEXPRESSIVE
BODYLANGUAGE: TENSE, DISTRESSED PACING, FIDGETING
NEGVOCALIZATION: OCCASIONAL MOAN/GROAN, LOW SPEECH, NEGATIVE/DISAPPROVING QUALITY
BREATHING: NORMAL
TOTALSCORE: 3
CONSOLABILITY: DISTRACTED OR REASSURED BY VOICE/TOUCH
FACIALEXPRESSION: SMILING OR INEXPRESSIVE
FACIALEXPRESSION: SAD, FRIGHTENED, FROWN
FACIALEXPRESSION: SMILING OR INEXPRESSIVE
TOTALSCORE: MEDICATION (SEE MAR)
BODYLANGUAGE: RELAXED

## 2025-03-09 ASSESSMENT — PAIN - FUNCTIONAL ASSESSMENT
PAIN_FUNCTIONAL_ASSESSMENT: PAINAD (PAIN ASSESSMENT IN ADVANCED DEMENTIA SCALE)

## 2025-03-09 ASSESSMENT — PAIN DESCRIPTION - ORIENTATION
ORIENTATION: RIGHT
ORIENTATION: RIGHT

## 2025-03-09 ASSESSMENT — PAIN DESCRIPTION - LOCATION
LOCATION: HIP
LOCATION: HIP

## 2025-03-09 ASSESSMENT — ACTIVITIES OF DAILY LIVING (ADL): LACK_OF_TRANSPORTATION: PATIENT UNABLE TO ANSWER

## 2025-03-09 NOTE — CONSULTS
"Late entry note, patient was seen and examined at 8 AM this morning prior to surgery.    Reason For Consult  Left hip injury    History Of Present Illness  Jabari Dumas is a 84 y.o. male presenting with left hip pain.  Patient has severe dementia and is a patient at a memory care facility.  He was brought to the emergency room because of hip pain and found to have a displaced femoral neck fracture.  History is obtained from his brother who is his power of .  There is no specific incident of the fall noted however he does state that the patient was examined and x-rayed little over a week ago for pain in his left hip and everything was found to be negative.  Currently patient has pain in the left hip but he is limited in his ability to describe it.  He is unable to bear weight.  Normally he does ambulate with assistance at his facility.  This is a new acute potentially limb or function threatening injury..     Past Medical History  He has a past medical history of Gastrointestinal hemorrhage (03/08/2025).    Surgical History  He has a past surgical history that includes CT guided imaging for needle placement (12/7/2017).     Social History  He reports that he has quit smoking. His smoking use included cigarettes. He does not have any smokeless tobacco history on file. He reports that he does not currently use alcohol. No history on file for drug use.    Family History  No family history on file.     Allergies  Patient has no known allergies.    Review of Systems  Negative except as above     Physical Exam  Left hip held in an externally rotated position, no abrasions or lacerations.  Pain with any attempted motion.  He is grossly moving his foot but unable to participate in neuro muscular exam.  Normal pulses distally     Last Recorded Vitals  Blood pressure 118/72, pulse 75, temperature 36.1 °C (97 °F), temperature source Temporal, resp. rate 19, height 1.82 m (5' 11.65\"), weight 87.3 kg (192 lb 7.4 oz), " SpO2 95%.    Relevant Results x-rays of the left hip and femur reviewed independently interpreted by me today, they show displaced left femoral neck fracture.      Scheduled medications  acetaminophen, 975 mg, oral, TID  atorvastatin, 40 mg, oral, Daily  azithromycin, 500 mg, oral, q24h YARELIS  cefTRIAXone, 2 g, intravenous, q24h  finasteride, 5 mg, oral, Daily  FLUoxetine, 10 mg, oral, Daily  pantoprazole, 40 mg, intravenous, Daily  sennosides-docusate sodium, 2 tablet, oral, Nightly  tamsulosin, 0.4 mg, oral, Daily  traZODone, 50 mg, oral, Nightly      Continuous medications     PRN medications  PRN medications: famotidine, HYDROmorphone, HYDROmorphone, ipratropium-albuteroL, melatonin, ondansetron, oxygen  Results for orders placed or performed during the hospital encounter of 03/08/25 (from the past 24 hours)   CBC and Auto Differential   Result Value Ref Range    WBC 9.2 4.4 - 11.3 x10*3/uL    nRBC 0.0 0.0 - 0.0 /100 WBCs    RBC 4.70 4.50 - 5.90 x10*6/uL    Hemoglobin 13.4 (L) 13.5 - 17.5 g/dL    Hematocrit 40.8 (L) 41.0 - 52.0 %    MCV 87 80 - 100 fL    MCH 28.5 26.0 - 34.0 pg    MCHC 32.8 32.0 - 36.0 g/dL    RDW 13.9 11.5 - 14.5 %    Platelets 376 150 - 450 x10*3/uL    Neutrophils % 79.5 40.0 - 80.0 %    Immature Granulocytes %, Automated 0.8 0.0 - 0.9 %    Lymphocytes % 7.7 13.0 - 44.0 %    Monocytes % 11.1 2.0 - 10.0 %    Eosinophils % 0.7 0.0 - 6.0 %    Basophils % 0.2 0.0 - 2.0 %    Neutrophils Absolute 7.31 (H) 1.60 - 5.50 x10*3/uL    Immature Granulocytes Absolute, Automated 0.07 0.00 - 0.50 x10*3/uL    Lymphocytes Absolute 0.71 (L) 0.80 - 3.00 x10*3/uL    Monocytes Absolute 1.02 (H) 0.05 - 0.80 x10*3/uL    Eosinophils Absolute 0.06 0.00 - 0.40 x10*3/uL    Basophils Absolute 0.02 0.00 - 0.10 x10*3/uL   Comprehensive metabolic panel   Result Value Ref Range    Glucose 107 (H) 74 - 99 mg/dL    Sodium 137 136 - 145 mmol/L    Potassium 3.4 (L) 3.5 - 5.3 mmol/L    Chloride 100 98 - 107 mmol/L    Bicarbonate 26  21 - 32 mmol/L    Anion Gap 14 10 - 20 mmol/L    Urea Nitrogen 41 (H) 6 - 23 mg/dL    Creatinine 1.72 (H) 0.50 - 1.30 mg/dL    eGFR 38 (L) >60 mL/min/1.73m*2    Calcium 8.7 8.6 - 10.3 mg/dL    Albumin 2.9 (L) 3.4 - 5.0 g/dL    Alkaline Phosphatase 99 33 - 136 U/L    Total Protein 6.8 6.4 - 8.2 g/dL    AST 53 (H) 9 - 39 U/L    Bilirubin, Total 0.8 0.0 - 1.2 mg/dL    ALT 48 10 - 52 U/L   Lactate   Result Value Ref Range    Lactate 1.3 0.4 - 2.0 mmol/L   Troponin I, High Sensitivity   Result Value Ref Range    Troponin I, High Sensitivity 48 (H) 0 - 20 ng/L   Troponin I, High Sensitivity   Result Value Ref Range    Troponin I, High Sensitivity 45 (H) 0 - 20 ng/L   Urinalysis with Reflex Culture and Microscopic   Result Value Ref Range    Color, Urine Light-Orange (N) Light-Yellow, Yellow, Dark-Yellow    Appearance, Urine Turbid (N) Clear    Specific Gravity, Urine 1.021 1.005 - 1.035    pH, Urine 7.0 5.0, 5.5, 6.0, 6.5, 7.0, 7.5, 8.0    Protein, Urine 20 (TRACE) NEGATIVE, 10 (TRACE), 20 (TRACE) mg/dL    Glucose, Urine Normal Normal mg/dL    Blood, Urine 0.06 (1+) (A) NEGATIVE mg/dL    Ketones, Urine NEGATIVE NEGATIVE mg/dL    Bilirubin, Urine NEGATIVE NEGATIVE mg/dL    Urobilinogen, Urine Normal Normal mg/dL    Nitrite, Urine NEGATIVE NEGATIVE    Leukocyte Esterase, Urine 500 Randolph/uL (A) NEGATIVE   Extra Urine Gray Tube   Result Value Ref Range    Extra Tube Hold for add-ons.    Microscopic Only, Urine   Result Value Ref Range    WBC, Urine >50 (A) 1-5, NONE /HPF    WBC Clumps, Urine FEW Reference range not established. /HPF    RBC, Urine 11-20 (A) NONE, 1-2, 3-5 /HPF    Squamous Epithelial Cells, Urine 1-9 (SPARSE) Reference range not established. /HPF    Bacteria, Urine 1+ (A) NONE SEEN /HPF   Renal Function Panel   Result Value Ref Range    Glucose 105 (H) 74 - 99 mg/dL    Sodium 136 136 - 145 mmol/L    Potassium 3.5 3.5 - 5.3 mmol/L    Chloride 103 98 - 107 mmol/L    Bicarbonate 26 21 - 32 mmol/L    Anion Gap 11 10  - 20 mmol/L    Urea Nitrogen 36 (H) 6 - 23 mg/dL    Creatinine 1.48 (H) 0.50 - 1.30 mg/dL    eGFR 46 (L) >60 mL/min/1.73m*2    Calcium 8.5 (L) 8.6 - 10.3 mg/dL    Phosphorus 3.5 2.5 - 4.9 mg/dL    Albumin 2.6 (L) 3.4 - 5.0 g/dL   Magnesium   Result Value Ref Range    Magnesium 2.16 1.60 - 2.40 mg/dL   CBC   Result Value Ref Range    WBC 8.4 4.4 - 11.3 x10*3/uL    nRBC 0.0 0.0 - 0.0 /100 WBCs    RBC 4.16 (L) 4.50 - 5.90 x10*6/uL    Hemoglobin 12.0 (L) 13.5 - 17.5 g/dL    Hematocrit 35.2 (L) 41.0 - 52.0 %    MCV 85 80 - 100 fL    MCH 28.8 26.0 - 34.0 pg    MCHC 34.1 32.0 - 36.0 g/dL    RDW 14.0 11.5 - 14.5 %    Platelets 344 150 - 450 x10*3/uL   Type and screen   Result Value Ref Range    ABO TYPE O     Rh TYPE POS     ANTIBODY SCREEN NEG    Protime-INR   Result Value Ref Range    Protime 13.2 (H) 9.8 - 12.4 seconds    INR 1.2 (H) 0.9 - 1.1   Troponin I, High Sensitivity   Result Value Ref Range    Troponin I, High Sensitivity 41 (H) 0 - 20 ng/L        Assessment/Plan     84-year-old male with severe dementia and displaced left femoral neck fracture.  The risks benefits and alternatives of left hip hemiarthroplasty were discussed with the patient's power of .  We discussed in detail and his brother who is his power of  elected to go ahead and he signed informed consent for the procedure.  Patient has been cleared medically.    I had an extensive discussion with the patient's power of  regarding the risks benefits and alternatives of various treatment options. In general nonoperative treatment for this type of fracture would involve extensive bed rest and the associated morbidities. It would avoid the potential consequences and risks of surgery. Risks of bed rest for extended period time or include but aren't limited to pressure ulceration,  urinary and pulmonary infections, deconditioning, and loss of function. The goal and benefit of surgical intervention will be early mobilization and control  of pain. Risks of surgery include but aren't limited to pain bleeding infection damage to surrounding nerves or blood vessels and other structures blood clots failure of bone to heal and healing in an inadequate position malunion nonunion potential need for additional surgeries mechanical failure failure of the fixation construct. Patient will require medical clearance  prior to surgery. All questions were answered, no guarnatees were implied or given. Patient power of  would like to go ahead with surgery she signed informed consent after extensive discussion all risks and benefits.    Jaun Buckley MD

## 2025-03-09 NOTE — CARE PLAN
The patient's goals for the shift include pain control and sleep    The clinical goals for the shift include pt will show minimal signs of pain and pt will sleep >5 hours this shift

## 2025-03-09 NOTE — PROGRESS NOTES
03/09/25 1129   Discharge Planning   Living Arrangements Other (Comment)  ([facility])   Support Systems Family members  (Brother, Gordo, is SHANICE.)   Assistance Needed Patient is from [facility]. Per facility patient is A&Ox 1-2 at baseline, is on room air, ambulated with staff assist prior to fracture, is dependent of personal care and is incontinent. Able to feed self with set up assistance and cues. Patient is currently in the OR. Will discuss preference to return to [facility] with patient's family once patient recovers from OR.   Type of Residence Nursing home/residential care   Do you have animals or pets at home? No   Who is requesting discharge planning? Provider   Home or Post Acute Services Post acute facilities (Rehab/SNF/etc)   Type of Post Acute Facility Services Long term care   Expected Discharge Disposition Inter   Does the patient need discharge transport arranged? Yes   RoundTrip coordination needed? Yes   Has discharge transport been arranged? No   Financial Resource Strain   How hard is it for you to pay for the very basics like food, housing, medical care, and heating? Pt Unable   Housing Stability   In the last 12 months, was there a time when you were not able to pay the mortgage or rent on time? Pt Unable   At any time in the past 12 months, were you homeless or living in a shelter (including now)? Pt Unable   Transportation Needs   In the past 12 months, has lack of transportation kept you from medical appointments or from getting medications? Pt Unable   In the past 12 months, has lack of transportation kept you from meetings, work, or from getting things needed for daily living? Pt Unable   Stroke Family Assessment   Stroke Family Assessment Needed No   Intensity of Service   Intensity of Service 0-30 min

## 2025-03-09 NOTE — CARE PLAN
Uneventful night. Pt rested comfortably. Minimal c/o pain this shift. Pt slept majority of the night. Pt continues to progress towards meeting goals. VSS. Will continue to monitor.

## 2025-03-09 NOTE — ANESTHESIA PROCEDURE NOTES
Airway  Date/Time: 3/9/2025 8:43 AM  Urgency: elective    Airway not difficult    Staffing  Performed: CRNA   Authorized by: MILAN Torres    Performed by: MILAN Torres  Patient location during procedure: OR    Indications and Patient Condition  Indications for airway management: anesthesia  Spontaneous ventilation: present  Preoxygenated: yes  Mask difficulty assessment: 1 - vent by mask  Planned trial extubation    Final Airway Details  Final airway type: endotracheal airway      Successful airway: ETT  Cuffed: yes   Successful intubation technique: video laryngoscopy  Endotracheal tube insertion site: oral  Blade: Yair  Blade size: #3  ETT size (mm): 7.5  Cormack-Lehane Classification: grade I - full view of glottis  Placement verified by: chest auscultation and capnometry   Measured from: teeth  ETT to teeth (cm): 20  Number of attempts at approach: 1  Ventilation between attempts: none  Number of other approaches attempted: 0

## 2025-03-09 NOTE — ANESTHESIA PREPROCEDURE EVALUATION
Patient: Jabari Dumas    Procedure Information       Date/Time: 03/09/25 0830    Procedure: ARTHROPLASTY, HIP, PARTIAL (Left: Hip)    Location: [facility] / [facility]    Surgeons: Jaun Buckley MD          Vitals:    03/09/25 0431   BP: 118/72   Pulse: 75   Resp:    Temp: 36.1 °C (97 °F)   SpO2: 95%       Past Surgical History:   Procedure Laterality Date    CT GUIDED IMAGING FOR NEEDLE PLACEMENT  12/7/2017    CT GUIDED IMAGING FOR NEEDLE PLACEMENT [facility]     Past Medical History:   Diagnosis Date    Gastrointestinal hemorrhage 03/08/2025       Current Facility-Administered Medications:     acetaminophen (Tylenol) tablet 975 mg, 975 mg, oral, TID, Greg Singh MD, 975 mg at 03/08/25 2018    atorvastatin (Lipitor) tablet 40 mg, 40 mg, oral, Daily, Greg Singh MD, 40 mg at 03/08/25 2019    azithromycin (Zithromax) tablet 500 mg, 500 mg, oral, q24h YARELIS, Greg Singh MD, 500 mg at 03/08/25 2018    cefTRIAXone (Rocephin) 2 g in dextrose (iso) IV 50 mL, 2 g, intravenous, q24h, Greg Singh MD, Stopped at 03/08/25 2053    famotidine (Pepcid) tablet 10 mg, 10 mg, oral, Daily PRN, Greg Singh MD    finasteride (Proscar) tablet 5 mg, 5 mg, oral, Daily, Greg Singh MD, 5 mg at 03/08/25 1702    FLUoxetine (PROzac) capsule 10 mg, 10 mg, oral, Daily, Greg Singh MD    HYDROmorphone (Dilaudid) injection 0.1 mg, 0.1 mg, intravenous, q3h PRN, Greg Singh MD    HYDROmorphone (Dilaudid) injection 0.2 mg, 0.2 mg, intravenous, q3h PRN, Greg Singh MD    ipratropium-albuteroL (Duo-Neb) 0.5-2.5 mg/3 mL nebulizer solution 3 mL, 3 mL, nebulization, q2h PRN, Greg Singh MD    melatonin tablet 5 mg, 5 mg, oral, Nightly PRN, Greg Singh MD    ondansetron (Zofran) injection 4 mg, 4 mg, intravenous, q6h PRN, Greg Singh MD    oxygen (O2) therapy, , inhalation, Continuous PRN - O2/gases, Greg Singh MD    pantoprazole (ProtoNix) injection 40 mg, 40 mg, intravenous, Daily,  Greg Singh MD    sennosides-docusate sodium (Sarah-Colace) 8.6-50 mg per tablet 2 tablet, 2 tablet, oral, Nightly, Greg Singh MD, 2 tablet at 03/08/25 2018    tamsulosin (Flomax) 24 hr capsule 0.4 mg, 0.4 mg, oral, Daily, Greg Singh MD, 0.4 mg at 03/08/25 1702    traZODone (Desyrel) tablet 50 mg, 50 mg, oral, Nightly, Greg Singh MD, 50 mg at 03/08/25 2019  Prior to Admission medications    Medication Sig Start Date End Date Taking? Authorizing Provider   atorvastatin (Lipitor) 40 mg tablet Take 1 tablet (40 mg) by mouth once daily. 1/30/17  Yes Historical Provider, MD   FLUoxetine (PROzac) 10 mg capsule Take 1 capsule (10 mg) by mouth once daily. 1/20/25  Yes Historical Provider, MD   furosemide (Lasix) 40 mg tablet Take 1 tablet (40 mg) by mouth once daily. 3/6/25  Yes Historical Provider, MD   traZODone (Desyrel) 50 mg tablet Take 1 tablet (50 mg) by mouth once daily at bedtime. 2/24/25  Yes Historical Provider, MD   finasteride (Proscar) 5 mg tablet Take 1 tablet (5 mg) by mouth once daily.    Historical Provider, MD   potassium chloride CR 10 mEq ER tablet Take 1 tablet (10 mEq) by mouth once daily.    Historical Provider, MD   tamsulosin (Flomax) 0.4 mg 24 hr capsule Take 1 capsule (0.4 mg) by mouth once daily.    Historical Provider, MD     No Known Allergies  Social History     Tobacco Use    Smoking status: Former     Types: Cigarettes    Smokeless tobacco: Not on file   Substance Use Topics    Alcohol use: Not Currently         Chemistry    Lab Results   Component Value Date/Time     03/09/2025 0523    K 3.5 03/09/2025 0523     03/09/2025 0523    CO2 26 03/09/2025 0523    BUN 36 (H) 03/09/2025 0523    CREATININE 1.48 (H) 03/09/2025 0523    Lab Results   Component Value Date/Time    CALCIUM 8.5 (L) 03/09/2025 0523    ALKPHOS 99 03/08/2025 1142    AST 53 (H) 03/08/2025 1142    ALT 48 03/08/2025 1142    BILITOT 0.8 03/08/2025 1142          Lab Results   Component Value Date/Time     WBC 8.4 03/09/2025 0523    HGB 12.0 (L) 03/09/2025 0523    HCT 35.2 (L) 03/09/2025 0523     03/09/2025 0523     Lab Results   Component Value Date/Time    PROTIME 13.2 (H) 03/09/2025 0523    INR 1.2 (H) 03/09/2025 0523     No results found for this or any previous visit (from the past 4464 hours).  No results found for this or any previous visit from the past 1095 days.      Relevant Problems   Cardiac   (+) Atherosclerosis of coronary artery   (+) Benign hypertension   (+) Chronic combined systolic and diastolic congestive heart failure   (+) Hyperlipidemia      Pulmonary   (+) Chronic obstructive pulmonary disease (Multi)   (+) Pneumonia      Neuro   (+) Anxiety   (+) Dementia with behavioral disturbance (Multi)      GI   (+) Difficulty in swallowing   (+) Peptic ulcer disease      /Renal   (+) Benign prostatic hyperplasia with urinary obstruction      Hematology   (+) Anemia      ID   (+) Pneumonia       Clinical information reviewed:   Tobacco  Allergies  Meds   Med Hx  Surg Hx   Fam Hx  Soc Hx        NPO Detail:  No data recorded     Physical Exam    Airway  Mallampati: II     Cardiovascular - normal exam     Dental    Pulmonary    Abdominal            Anesthesia Plan    History of general anesthesia?: yes  History of complications of general anesthesia?: no    ASA 4     general     The patient is not a current smoker.  Patient was not previously instructed to abstain from smoking on day of procedure.  Patient did not smoke on day of procedure.  Education provided regarding risk of obstructive sleep apnea.  intravenous induction   Anesthetic plan and risks discussed with patient.    Plan discussed with CRNA.

## 2025-03-09 NOTE — PROGRESS NOTES
"    [facility]  Department of Hospital Medicine    PROGRESS NOTE    Jabari Dumas is a 84 y.o. male on day 1 of admission presenting with Closed left hip fracture, initial encounter.    Subjective   Going to OR today.        Objective     Last Recorded Vitals:  BP 92/53 (BP Location: Left arm, Patient Position: Lying)   Pulse 67   Temp 36.8 °C (98.2 °F) (Temporal)   Resp 15   Ht 1.82 m (5' 11.65\")   Wt 87.3 kg (192 lb 7.4 oz)   SpO2 97%   BMI 26.36 kg/m²      Scheduled medications:  acetaminophen, 975 mg, oral, TID  atorvastatin, 40 mg, oral, Daily  azithromycin, 500 mg, oral, q24h YARELIS  ceFAZolin, 2 g, intravenous, q8h  cefTRIAXone, 2 g, intravenous, q24h  finasteride, 5 mg, oral, Daily  FLUoxetine, 10 mg, oral, Daily  pantoprazole, 40 mg, intravenous, Daily  sennosides-docusate sodium, 2 tablet, oral, Nightly  tamsulosin, 0.4 mg, oral, Daily  traZODone, 50 mg, oral, Nightly      Continuous medications:     PRN medications:  PRN medications: famotidine, HYDROmorphone, HYDROmorphone, ipratropium-albuteroL, melatonin, ondansetron, oxygen     Relevant Results:  Lab Results   Component Value Date    WBC 8.4 03/09/2025    HGB 12.0 (L) 03/09/2025    HCT 35.2 (L) 03/09/2025    MCV 85 03/09/2025     03/09/2025      Lab Results   Component Value Date    GLUCOSE 105 (H) 03/09/2025    CALCIUM 8.5 (L) 03/09/2025     03/09/2025    K 3.5 03/09/2025    CO2 26 03/09/2025     03/09/2025    BUN 36 (H) 03/09/2025    CREATININE 1.48 (H) 03/09/2025                        Assessment/Plan   Assessment & Plan  Closed left hip fracture, initial encounter    Chronic retention of urine    Stage 3b chronic kidney disease (Multi)    Pneumonia    Peptic ulcer disease    Benign hypertension    Hyperlipidemia    Dementia with behavioral disturbance (Multi)    Difficulty in swallowing    Chronic obstructive pulmonary disease (Multi)    Chronic combined systolic and diastolic " congestive heart failure    Benign prostatic hyperplasia with urinary obstruction    Atherosclerosis of coronary artery    Anemia    Closed fracture of neck of left femur    Jabari Dumas is an 84 y.o. male with dementia, sick sinus syndrome status post PPM, hypertension, hyperlipidemia, CAD status post CABG 2017, chronic diastolic heart failure, COPD, CKD 3A, BPH with history of urinary retention, GERD and PUD complicated by remote GI bleed.  He presented to the [facility] ED from his memory care unit due to sudden onset left leg pain.  Family reported he has had multiple recent falls.  Vitals in the ED showed hypotension and hypoxia.  He was noted to have a shortened, externally rotated left leg.  Labs showed improvement in chronic anemia, ARAM, hypokalemia, and elevated troponin stable on repeat.  Full body motion-degraded CT imaging showed bilateral lower lobe pneumonia with mucous plugging, displaced left femoral neck fracture, possible pelvic fractures, possible distal right radius fracture as well as several incidental findings including various cysts, thyroid enlargement, esophageal fluid, hiatal hernia, staghorn calculus in the left kidney, and a bladder stone.      Acute displaced left femoral neck fracture:  -orthopedics consulted; performed left hip hemiarthroplasty on 3/9  -pain mgmt and bowel regimen   -PT/OT   -Perioperative cefazolin per orthopedics     Bibasilar pneumonia: Suspicious for aspiration pneumonia  COPD:  -Ceftriaxone and azithromycin to cover gram-negative pneumonia  -Respiratory culture, Legionella and strep antigens, procalcitonin pending  -Bronchial hygiene, incentive spirometry, DuoNebs  -SLP consulted  -Oxygen, wean as tolerated     ARAM on CKD 3A:  Chronic diastolic CHF: Appears dry to euvolemic  Hypokalemia:  -Hold home Lasix  -Cautious IV hydration; monitor volume status  -Trend renal function and electrolytes; replace potassium     CAD:  Hyperlipidemia:  Non-MI troponin  elevation: Due to above  -Continue statin perioperatively     Dementia:  -Continue fluoxetine, trazodone     BPH with history of urinary retention:  Multiple asymptomatic renal calculi:  -Continue finasteride, tamsulosin  -Urinalysis pending  -Bladder scans to assess for urinary retention     GERD:  History of PUD:  Esophageal reflux and hiatal hernia on CT:  -PPI     Anemia:  -Trend CBC     DVT PPx: Subcu heparin; resume postop day 1     Dispo: Inpatient, likely 3 to 4 days anticipating need for skilled placement postoperatively         Greg Singh MD    Patient Name:  Jabari Dumas   MRN:   [id number]   Room/Bed:  144/144-A

## 2025-03-09 NOTE — ANESTHESIA POSTPROCEDURE EVALUATION
Patient: Jabari Dumas    Procedure Summary       Date: 03/09/25 Room / Location: [facility] / [facility]    Anesthesia Start: 0836 Anesthesia Stop: 1101    Procedure: ARTHROPLASTY, HIP, PARTIAL (Left: Hip) Diagnosis:       Closed fracture of neck of left femur, initial encounter      (Closed fracture of neck of left femur, initial encounter [S72.002A])    Surgeons: Jaun Buckley MD Responsible Provider: Rocky Grimm MD    Anesthesia Type: general ASA Status: 4            Anesthesia Type: general    Vitals Value Taken Time   BP 97/44 03/09/25 1147   Temp 37 °C (98.6 °F) 03/09/25 1055   Pulse 66 03/09/25 1149   Resp 20 03/09/25 1140   SpO2 97 % 03/09/25 1149   Vitals shown include unfiled device data.    Anesthesia Post Evaluation    Patient location during evaluation: PACU  Patient participation: complete - patient participated  Level of consciousness: awake  Pain management: adequate  Multimodal analgesia pain management approach  Airway patency: patent  Two or more strategies used to mitigate risk of obstructive sleep apnea  Cardiovascular status: acceptable  Respiratory status: acceptable  Hydration status: acceptable  Postoperative Nausea and Vomiting: none        No notable events documented.

## 2025-03-09 NOTE — OP NOTE
ARTHROPLASTY, HIP, PARTIAL (L) Operative Note     Date: 3/8/2025 - 3/9/2025  OR Location: [facility]    Name: Jabari Dumas, : 1940, Age: 84 y.o., MRN: [id number], Sex: male    Diagnosis  Pre-op Diagnosis      * Closed fracture of neck of left femur, initial encounter [S72.002A] Post-op Diagnosis     * Closed fracture of neck of left femur, initial encounter [S72.002A]     Procedures  ARTHROPLASTY, HIP, PARTIAL  [id number] - [place] HEMIARTHROPLASTY HIP PARTIAL      Surgeons      * Jaun Buckley - Primary    Resident/Fellow/Other Assistant:  Surgeons and Role:  * No surgeons found with a matching role *    Staff:   Circulator: Cheryl Ridley Person: Omid    Anesthesia Staff: Anesthesiologist: Rocky Grimm MD  CRNA: SARI Torres-VÍCTOR    Procedure Summary  Anesthesia: General  ASA: IV  Estimated Blood Loss: 250mL  Intra-op Medications:   Administrations occurring from 0830 to 1120 on 25:   Medication Name Total Dose   EPINEPHrine (Adrenalin) 0.2 mL, ketorolac (Toradol) 30 mg, morphine PF (Duramorph) 1 mg/mL 5 mg, ropivacaine (Naropin) 5 mg/mL (0.5 %) 30 mL in sodium chloride 0.9% 20 mL syringe 57 mL   sodium chloride 0.9 % irrigation solution 3,000 mL   acetaminophen (Tylenol) tablet 975 mg Cannot be calculated   albumin human 5 % 250 mL   atorvastatin (Lipitor) tablet 40 mg Cannot be calculated   azithromycin (Zithromax) tablet 500 mg Cannot be calculated   ceFAZolin (Ancef) vial 1 g 2 g   cefTRIAXone (Rocephin) 2 g in dextrose (iso) IV 50 mL Cannot be calculated   famotidine (Pepcid) tablet 10 mg Cannot be calculated   fentaNYL PF 0.05 mg/mL 100 mcg   finasteride (Proscar) tablet 5 mg Cannot be calculated   FLUoxetine (PROzac) capsule 10 mg Cannot be calculated   HYDROmorphone (Dilaudid) injection 0.1 mg Cannot be calculated   HYDROmorphone (Dilaudid) injection 0.2 mg Cannot be calculated   ipratropium-albuteroL (Duo-Neb) 0.5-2.5 mg/3 mL nebulizer solution 3 mL Cannot be calculated   LR infusion  291.67 mL   lidocaine (Xylocaine) injection 2 % 80 mg   melatonin tablet 5 mg Cannot be calculated   ondansetron (Zofran) injection 4 mg Cannot be calculated   pantoprazole (ProtoNix) injection 40 mg Cannot be calculated   phenylephrine 40 mcg/mL syringe 10 mL 400 mcg   propofol (Diprivan) bolus from bag 50 mg   rocuronium (ZeMuron) 50 mg/5 mL injection 70 mg   sennosides-docusate sodium (Sarah-Colace) 8.6-50 mg per tablet 2 tablet Cannot be calculated   sugammadex (Bridion) 200 mg/2 mL injection 100 mg   tamsulosin (Flomax) 24 hr capsule 0.4 mg Cannot be calculated   traZODone (Desyrel) tablet 50 mg Cannot be calculated              Anesthesia Record               Intraprocedure I/O Totals          Intake    Propofol Drip 0.00 mL    The total shown is the total volume documented since Anesthesia Start was filed.    Total Intake 0 mL          Specimen:   ID Type Source Tests Collected by Time   1 : LEFT FEMORAL HEAD Tissue FEMORAL HEAD LEFT SURGICAL PATHOLOGY EXAM Jaun Buckley MD 3/9/2025 1011                 Drains and/or Catheters:   External Urinary Catheter Male (Active)   External Catheter Status Changed 03/08/25 2100   Output (mL) 200 mL 03/09/25 0431       Tourniquet Times:         Implants:  Implants       Type Name Action Serial No.      Screw BIPOLAR, UHR, 26 X 49MM - [id number] Implanted      Joint HEAD, FEMUR V40 26/-3 COCR - [id number] Implanted      Joint Hip STEM, HIP, BARRERA, INSIGNIA, 6 STANDARD - [id number] Implanted               Findings: Displaced left femoral neck fracture    Indications: Jabari Dumas is an 84 y.o. male who is having surgery for Closed fracture of neck of left femur, initial encounter [S72.002A].     The patient was seen in the preoperative area. The risks, benefits, complications, treatment options, non-operative alternatives, expected recovery and outcomes were discussed with the patient. The possibilities of reaction to medication, pulmonary aspiration, injury to  surrounding structures, bleeding, recurrent infection, the need for additional procedures, failure to diagnose a condition, and creating a complication requiring transfusion or operation were discussed with the patient. The patient concurred with the proposed plan, giving informed consent.  The site of surgery was properly noted/marked if necessary per policy. The patient has been actively warmed in preoperative area. Preoperative antibiotics have been ordered and given within 1 hours of incision. Venous thrombosis prophylaxis have been ordered including bilateral sequential compression devices and chemical prophylaxis    Procedure Details: Statement of medical necessity:    This is a 84 y.o. male with displaced left femoral neck fracture.  The risks, benefits and alternatives of left hip hemiarthroplasty arthroplasty were discussed with the patient's brother who is his power of  and they signed informed consent.     Surgical assistants, as listed above, including residents if listed, were involved in the procedure. They are trained and qualified to assist in the procedure. Prior to the procedure they assisted with patient positioning, setup, and surgical skin preparation. During the procedure, they actively assisted Dr. Buckley in completing the operation safely and expeditiously by helping to provide exposure, retract tissues, maintain hemostasis, closure, and any other necessary technical tasks.      DESCRIPTION OF PROCEDURE:  The patient was brought to the operative room, and anesthesia was initiated by the anesthesia team. Appropriate preoperative antibiotics were given. The patient was then secured to the [place] table in the standard fashion. The patient was prepped and draped in the usual sterile fashion, a time out was performed, the patient was identified by name and medical record number and the laterality and site of surgery were confirmed by all present.  Standard direct anterior approach to the hip  was made. Hemostasis was obtained with Bovie electrocautery. Anterior and superior capsulectomy was performed in the usual fashion.  Displaced femoral neck fracture was noted, cleaned up femoral neck osteotomy was performed in accordance with the preoperative plan, and femoral head extracted from the acetabulum without difficulty.      The femoral head was measured and an appropriately sized bipolar head trial was inserted with the acetabulum and checked for appropriate sizing and fit.    Attention was then directed to the femoral side. The femoral elevating hook was inserted. Further soft tissue release was performed to allow anterior translation of the proximal femur. This included release of the obturator internus tendon. The obturator externus was preserved. After adequate mobilization of the femur, the medullary canal was developed with a curved curette followed by a reverse cutting rasp. The proximal femur is then prepared using sequentially larger broaches up to the definitive implant size. The final broach was both rotationally and longitudinally stable. The hip was reduced and flouro was used to assess the implant. There was excellent fit and fill of the implant and the leg lengths looked appropriate comparing the lesser tuberosities to the ischium on both sides. Stability was then tested; with 60 degrees of external rotation of the hip and full extension the implant was stable.     Trial implant was removed from the femur. The definitive implant was inserted. The trunnion of the femoral component was then cleaned and dried, followed by impaction of the definitive prosthetic femoral head. The hip was reduced with normal findings again noted.    The wound was irrigated with irrisept solution followed by normal saline. The pericapsular soft tissues were injected with ropivicaine, epinephrine, toradol,  and duramorph. The myofascia was closed using interrupted #1 polysorb, then #2 quill, followed by skin  closure using inverted 2-0 poly, 3-0 v-lock in the subcuticular layer, and perineo dressing.  Mepilex AG silver impregnated dressing was then placed. Patient was awoken from anesthesia by the anesthesia team and brought to the pacu in stable condition    Post operative plan: patient may bear weight as tolerated, anterior hip precautions, antibiotics and dvt prophylaxis per protocol, standard post operative supportive care     Statement of staff presence: I was present during all key and critical portions of the procedure and immediately available during closure.        Complications:  None; patient tolerated the procedure well.    Disposition: PACU - hemodynamically stable.  Condition: stable         Additional Details: none    Attending Attestation: I was present and scrubbed for the key portions of the procedure.    Jaun Buckley  Phone Number: [phone]

## 2025-03-10 LAB
ALBUMIN SERPL BCP-MCNC: 2.8 G/DL (ref 3.4–5)
ANION GAP SERPL CALC-SCNC: 13 MMOL/L (ref 10–20)
BUN SERPL-MCNC: 34 MG/DL (ref 6–23)
CALCIUM SERPL-MCNC: 8.3 MG/DL (ref 8.6–10.3)
CHLORIDE SERPL-SCNC: 101 MMOL/L (ref 98–107)
CO2 SERPL-SCNC: 27 MMOL/L (ref 21–32)
CREAT SERPL-MCNC: 1.54 MG/DL (ref 0.5–1.3)
EGFRCR SERPLBLD CKD-EPI 2021: 44 ML/MIN/1.73M*2
ERYTHROCYTE [DISTWIDTH] IN BLOOD BY AUTOMATED COUNT: 14.2 % (ref 11.5–14.5)
GLUCOSE SERPL-MCNC: 97 MG/DL (ref 74–99)
HCT VFR BLD AUTO: 37.5 % (ref 41–52)
HGB BLD-MCNC: 11.9 G/DL (ref 13.5–17.5)
MAGNESIUM SERPL-MCNC: 2.18 MG/DL (ref 1.6–2.4)
MCH RBC QN AUTO: 28.5 PG (ref 26–34)
MCHC RBC AUTO-ENTMCNC: 31.7 G/DL (ref 32–36)
MCV RBC AUTO: 90 FL (ref 80–100)
NRBC BLD-RTO: 0 /100 WBCS (ref 0–0)
PHOSPHATE SERPL-MCNC: 3.9 MG/DL (ref 2.5–4.9)
PLATELET # BLD AUTO: 360 X10*3/UL (ref 150–450)
POTASSIUM SERPL-SCNC: 3.6 MMOL/L (ref 3.5–5.3)
RBC # BLD AUTO: 4.18 X10*6/UL (ref 4.5–5.9)
SODIUM SERPL-SCNC: 137 MMOL/L (ref 136–145)
WBC # BLD AUTO: 10.5 X10*3/UL (ref 4.4–11.3)

## 2025-03-10 PROCEDURE — 2500000004 HC RX 250 GENERAL PHARMACY W/ HCPCS (ALT 636 FOR OP/ED): Performed by: ORTHOPAEDIC SURGERY

## 2025-03-10 PROCEDURE — 83735 ASSAY OF MAGNESIUM: CPT | Performed by: ORTHOPAEDIC SURGERY

## 2025-03-10 PROCEDURE — 2500000002 HC RX 250 W HCPCS SELF ADMINISTERED DRUGS (ALT 637 FOR MEDICARE OP, ALT 636 FOR OP/ED): Performed by: ORTHOPAEDIC SURGERY

## 2025-03-10 PROCEDURE — 99232 SBSQ HOSP IP/OBS MODERATE 35: CPT | Performed by: NURSE PRACTITIONER

## 2025-03-10 PROCEDURE — 36415 COLL VENOUS BLD VENIPUNCTURE: CPT | Performed by: ORTHOPAEDIC SURGERY

## 2025-03-10 PROCEDURE — 1100000001 HC PRIVATE ROOM DAILY

## 2025-03-10 PROCEDURE — 2500000001 HC RX 250 WO HCPCS SELF ADMINISTERED DRUGS (ALT 637 FOR MEDICARE OP): Performed by: ORTHOPAEDIC SURGERY

## 2025-03-10 PROCEDURE — 2500000004 HC RX 250 GENERAL PHARMACY W/ HCPCS (ALT 636 FOR OP/ED): Performed by: STUDENT IN AN ORGANIZED HEALTH CARE EDUCATION/TRAINING PROGRAM

## 2025-03-10 PROCEDURE — 2500000001 HC RX 250 WO HCPCS SELF ADMINISTERED DRUGS (ALT 637 FOR MEDICARE OP): Performed by: NURSE PRACTITIONER

## 2025-03-10 PROCEDURE — 92610 EVALUATE SWALLOWING FUNCTION: CPT | Mod: GN

## 2025-03-10 PROCEDURE — 85027 COMPLETE CBC AUTOMATED: CPT | Performed by: ORTHOPAEDIC SURGERY

## 2025-03-10 PROCEDURE — 80069 RENAL FUNCTION PANEL: CPT | Performed by: ORTHOPAEDIC SURGERY

## 2025-03-10 PROCEDURE — 97161 PT EVAL LOW COMPLEX 20 MIN: CPT | Mod: GP

## 2025-03-10 PROCEDURE — 97165 OT EVAL LOW COMPLEX 30 MIN: CPT | Mod: GO

## 2025-03-10 RX ORDER — PANTOPRAZOLE SODIUM 40 MG/1
40 TABLET, DELAYED RELEASE ORAL
Status: DISCONTINUED | OUTPATIENT
Start: 2025-03-11 | End: 2025-03-12 | Stop reason: HOSPADM

## 2025-03-10 RX ORDER — ACETAMINOPHEN 650 MG/1
650 SUPPOSITORY RECTAL EVERY 4 HOURS
Status: DISCONTINUED | OUTPATIENT
Start: 2025-03-10 | End: 2025-03-12 | Stop reason: HOSPADM

## 2025-03-10 RX ORDER — OXYCODONE HYDROCHLORIDE 5 MG/1
5 TABLET ORAL EVERY 6 HOURS PRN
Status: DISCONTINUED | OUTPATIENT
Start: 2025-03-10 | End: 2025-03-12 | Stop reason: HOSPADM

## 2025-03-10 RX ORDER — ACETAMINOPHEN 160 MG/5ML
650 SOLUTION ORAL EVERY 4 HOURS
Status: DISCONTINUED | OUTPATIENT
Start: 2025-03-10 | End: 2025-03-12 | Stop reason: HOSPADM

## 2025-03-10 RX ORDER — EAR PLUGS
1 EACH OTIC (EAR) 2 TIMES DAILY
Status: DISCONTINUED | OUTPATIENT
Start: 2025-03-10 | End: 2025-03-12 | Stop reason: HOSPADM

## 2025-03-10 RX ORDER — ACETAMINOPHEN 325 MG/1
650 TABLET ORAL EVERY 4 HOURS
Status: DISCONTINUED | OUTPATIENT
Start: 2025-03-10 | End: 2025-03-12 | Stop reason: HOSPADM

## 2025-03-10 RX ADMIN — ACETAMINOPHEN 650 MG: 325 TABLET ORAL at 06:02

## 2025-03-10 RX ADMIN — TAMSULOSIN HYDROCHLORIDE 0.4 MG: 0.4 CAPSULE ORAL at 09:11

## 2025-03-10 RX ADMIN — ACETAMINOPHEN 650 MG: 325 TABLET ORAL at 18:00

## 2025-03-10 RX ADMIN — ACETAMINOPHEN 650 MG: 325 TABLET ORAL at 09:11

## 2025-03-10 RX ADMIN — Medication 1 APPLICATION: at 22:01

## 2025-03-10 RX ADMIN — CEFAZOLIN SODIUM 2 G: 2 SOLUTION INTRAVENOUS at 00:39

## 2025-03-10 RX ADMIN — HYDROMORPHONE HYDROCHLORIDE 0.2 MG: 1 INJECTION, SOLUTION INTRAMUSCULAR; INTRAVENOUS; SUBCUTANEOUS at 07:56

## 2025-03-10 RX ADMIN — HEPARIN SODIUM 5000 UNITS: 5000 INJECTION, SOLUTION INTRAVENOUS; SUBCUTANEOUS at 16:24

## 2025-03-10 RX ADMIN — CEFTRIAXONE SODIUM 2 G: 2 INJECTION, SOLUTION INTRAVENOUS at 16:24

## 2025-03-10 RX ADMIN — TRAZODONE HYDROCHLORIDE 50 MG: 50 TABLET ORAL at 21:19

## 2025-03-10 RX ADMIN — HEPARIN SODIUM 5000 UNITS: 5000 INJECTION, SOLUTION INTRAVENOUS; SUBCUTANEOUS at 09:11

## 2025-03-10 RX ADMIN — CEFAZOLIN SODIUM 2 G: 2 SOLUTION INTRAVENOUS at 09:11

## 2025-03-10 RX ADMIN — ACETAMINOPHEN 650 MG: 325 TABLET ORAL at 02:05

## 2025-03-10 RX ADMIN — AZITHROMYCIN 500 MG: 250 TABLET, FILM COATED ORAL at 09:11

## 2025-03-10 RX ADMIN — ATORVASTATIN CALCIUM 40 MG: 40 TABLET, FILM COATED ORAL at 21:19

## 2025-03-10 RX ADMIN — FLUOXETINE HYDROCHLORIDE 10 MG: 10 CAPSULE ORAL at 09:11

## 2025-03-10 RX ADMIN — OXYCODONE HYDROCHLORIDE 5 MG: 5 TABLET ORAL at 21:19

## 2025-03-10 RX ADMIN — FINASTERIDE 5 MG: 5 TABLET, FILM COATED ORAL at 09:11

## 2025-03-10 RX ADMIN — PANTOPRAZOLE SODIUM 40 MG: 40 INJECTION, POWDER, FOR SOLUTION INTRAVENOUS at 09:11

## 2025-03-10 ASSESSMENT — COGNITIVE AND FUNCTIONAL STATUS - GENERAL
MOVING TO AND FROM BED TO CHAIR: TOTAL
HELP NEEDED FOR BATHING: A LOT
TOILETING: TOTAL
EATING MEALS: A LOT
DRESSING REGULAR UPPER BODY CLOTHING: A LOT
DAILY ACTIVITIY SCORE: 10
TURNING FROM BACK TO SIDE WHILE IN FLAT BAD: A LOT
MOBILITY SCORE: 6
CLIMB 3 TO 5 STEPS WITH RAILING: TOTAL
TURNING FROM BACK TO SIDE WHILE IN FLAT BAD: TOTAL
TOILETING: TOTAL
MOVING FROM LYING ON BACK TO SITTING ON SIDE OF FLAT BED WITH BEDRAILS: A LOT
PERSONAL GROOMING: TOTAL
STANDING UP FROM CHAIR USING ARMS: TOTAL
WALKING IN HOSPITAL ROOM: TOTAL
MOBILITY SCORE: 8
DRESSING REGULAR LOWER BODY CLOTHING: TOTAL
MOVING FROM LYING ON BACK TO SITTING ON SIDE OF FLAT BED WITH BEDRAILS: TOTAL
DAILY ACTIVITIY SCORE: 9
DRESSING REGULAR UPPER BODY CLOTHING: A LOT
WALKING IN HOSPITAL ROOM: TOTAL
CLIMB 3 TO 5 STEPS WITH RAILING: TOTAL
DRESSING REGULAR LOWER BODY CLOTHING: TOTAL
EATING MEALS: A LOT
PERSONAL GROOMING: A LOT
MOVING TO AND FROM BED TO CHAIR: TOTAL
HELP NEEDED FOR BATHING: A LOT
STANDING UP FROM CHAIR USING ARMS: TOTAL

## 2025-03-10 ASSESSMENT — PAIN SCALES - PAIN ASSESSMENT IN ADVANCED DEMENTIA (PAINAD)
NEGVOCALIZATION: REPEATED TROUBLED CALLING OUT, LOUD MOANING/GROANING, CRYING
CONSOLABILITY: UNABLE TO CONSOLE, DISTRACT OR REASSURE
BODYLANGUAGE: TENSE, DISTRESSED PACING, FIDGETING
TOTALSCORE: 8
BODYLANGUAGE: RELAXED
NEGVOCALIZATION: OCCASIONAL MOAN/GROAN, LOW SPEECH, NEGATIVE/DISAPPROVING QUALITY
TOTALSCORE: 2
FACIALEXPRESSION: SMILING OR INEXPRESSIVE
CONSOLABILITY: DISTRACTED OR REASSURED BY VOICE/TOUCH
FACIALEXPRESSION: REPEATED TROUBLED CALLING OUT, LOUD MOANING/GROANING, CRYING
CONSOLABILITY: DISTRACTED OR REASSURED BY VOICE/TOUCH
FACIALEXPRESSION: SMILING OR INEXPRESSIVE
TOTALSCORE: MEDICATION (SEE MAR);COLD APPLIED;REPOSITIONED
BREATHING: FACIAL GRIMACING
BREATHING: OCCASIONAL LABORED BREATHING, SHORT PERIOD OF HYPERVENTILATION
TOTALSCORE: 5
BREATHING: NORMAL
TOTALSCORE: 5
TOTALSCORE: UNABLE TO CONSOLE, DISTRACT OR REASSURE
BODYLANGUAGE: TENSE, DISTRESSED PACING, FIDGETING
BODYLANGUAGE: RELAXED
FACIALEXPRESSION: SAD, FRIGHTENED, FROWN
NEGVOCALIZATION: OCCASIONAL MOAN/GROAN, LOW SPEECH, NEGATIVE/DISAPPROVING QUALITY
TOTALSCORE: MEDICATION (SEE MAR);COLD APPLIED;REPOSITIONED
NEGVOCALIZATION: OCCASIONAL MOAN/GROAN, LOW SPEECH, NEGATIVE/DISAPPROVING QUALITY
TOTALSCORE: 9
BREATHING: OCCASIONAL LABORED BREATHING, SHORT PERIOD OF HYPERVENTILATION
BODYLANGUAGE: RELAXED
FACIALEXPRESSION: SAD, FRIGHTENED, FROWN
BODYLANGUAGE: RIGID, FISTS CLENCHED, KNEES UP, PUSHING/PULLING AWAY, STRIKES OUT
NEGVOCALIZATION: OCCASIONAL MOAN/GROAN, LOW SPEECH, NEGATIVE/DISAPPROVING QUALITY
CONSOLABILITY: NO NEED TO CONSOLE
CONSOLABILITY: UNABLE TO CONSOLE, DISTRACT OR REASSURE
TOTALSCORE: 1
BREATHING: OCCASIONAL LABORED BREATHING, SHORT PERIOD OF HYPERVENTILATION
BREATHING: NORMAL
FACIALEXPRESSION: SAD, FRIGHTENED, FROWN
BREATHING: NOISY LABORED BREATHING, LONG PERIODS OF HYPERVENTILATION, CHEYNE-STOKES RESPIRATIONS

## 2025-03-10 ASSESSMENT — ACTIVITIES OF DAILY LIVING (ADL)
ADL_ASSISTANCE: NEEDS ASSISTANCE
ADL_ASSISTANCE: NEEDS ASSISTANCE
LACK_OF_TRANSPORTATION: PATIENT UNABLE TO ANSWER
BATHING_ASSISTANCE: MAXIMAL

## 2025-03-10 ASSESSMENT — PAIN SCALES - GENERAL
PAINLEVEL_OUTOF10: 9
PAINLEVEL_OUTOF10: 8

## 2025-03-10 ASSESSMENT — COLUMBIA-SUICIDE SEVERITY RATING SCALE - C-SSRS
1. IN THE PAST MONTH, HAVE YOU WISHED YOU WERE DEAD OR WISHED YOU COULD GO TO SLEEP AND NOT WAKE UP?: NO
6. HAVE YOU EVER DONE ANYTHING, STARTED TO DO ANYTHING, OR PREPARED TO DO ANYTHING TO END YOUR LIFE?: NO
2. HAVE YOU ACTUALLY HAD ANY THOUGHTS OF KILLING YOURSELF?: NO

## 2025-03-10 ASSESSMENT — PAIN SCALES - WONG BAKER
WONGBAKER_NUMERICALRESPONSE: HURTS WHOLE LOT
WONGBAKER_NUMERICALRESPONSE: HURTS WHOLE LOT

## 2025-03-10 ASSESSMENT — PAIN - FUNCTIONAL ASSESSMENT
PAIN_FUNCTIONAL_ASSESSMENT: UNABLE TO SELF-REPORT
PAIN_FUNCTIONAL_ASSESSMENT: PAINAD (PAIN ASSESSMENT IN ADVANCED DEMENTIA SCALE)
PAIN_FUNCTIONAL_ASSESSMENT: PAINAD (PAIN ASSESSMENT IN ADVANCED DEMENTIA SCALE)
PAIN_FUNCTIONAL_ASSESSMENT: WONG-BAKER FACES
PAIN_FUNCTIONAL_ASSESSMENT: WONG-BAKER FACES

## 2025-03-10 NOTE — CONSULTS
Wound Care Consult     Visit Date: 3/10/2025      Patient Name: Jabari Dumas         MRN: [id number]           YOB: 1940     Reason for Consult:  Chronic discoloration scrotum, inner thighs     Wound History:   present on admit     Pertinent Labs:   Albumin   Date Value Ref Range Status   03/10/2025 2.8 (L) 3.4 - 5.0 g/dL Final       Wound Assessment:  Wound 03/08/25 Leg Dorsal;Proximal;Right;Upper (Active)   Wound Image   03/08/25 1857   Drainage Description None 03/09/25 2041   Drainage Amount None 03/09/25 2041   Dressing Open to air 03/09/25 2041   Dressing Status Clean;Dry 03/09/25 0810       Wound 03/09/25 Incision Hip Left;Anterior (Active)   Site Assessment Unable to assess;Other (Comment) 03/09/25 2041   Closure Approximated;Glue;Sutures 03/09/25 1310   Drainage Description None 03/09/25 2041   Drainage Amount None 03/09/25 2041   Dressing Silver dressing 03/09/25 2041       Wound Team Summary Assessment: Patient up in chair, left hip postop dressings dry/intact, much ecchymosis evident.  Skin to scrotum/groin discolored as noted above but no acute skin changes, rashes, open areas.  Skin to buttock intact with sacral Mepilex to protect.  PureWick is in use.  Recommend we add zinc oxide to protect, order obtained.      Wound Team Plan:  zinc oxide to protect groin /scrotum.     Radha Zheng RN, WCC, DWC  3/10/2025  5:24 PM

## 2025-03-10 NOTE — ASSESSMENT & PLAN NOTE
Jabari Dumas is an 84 y.o. male with dementia presented to the [facility] ED from his memory care unit due to sudden onset left leg pain.     Acute displaced left femoral neck fracture:  -orthopedics consulted; performed left hip hemiarthroplasty on 3/9  -pain mgmt and bowel regimen   -PT/OT   -Perioperative cefazolin per orthopedics     Bibasilar pneumonia: Suspicious for aspiration pneumonia  COPD:  -Azithromycin complete, continue ceftriaxone  -Respiratory culture, Legionella and strep antigens, procalcitonin pending  -Bronchial hygiene, incentive spirometry, DuoNebs  -SLP consulted  -Oxygen, wean as tolerated    Acute cystitis  UA positive, culture growing enteric bacilli  On ceftriaxone, follow culture       Chronic kidney disease  ARAM resolved, baseline creatinine is around 1.3-1.5  -Hold home Lasix  -Cautious IV hydration; monitor volume status  -Trend renal function and electrolytes    Chronic diastolic heart failure  Lasix currently on hold, resume when able  Appears euvolemic  Monitor fluid status close  Daily weights  Strict I's and O's     CAD:  Hyperlipidemia:  Non-MI troponin elevation: Due to above  -Continue statin perioperatively     Dementia:  -Continue fluoxetine, trazodone     BPH with history of urinary retention:  Multiple asymptomatic renal calculi:  -Continue finasteride, tamsulosin  -Urinalysis positive, culture growing enteric bacilli  -Bladder scans to assess for urinary retention     GERD:  History of PUD:  Esophageal reflux and hiatal hernia on CT:  -PPI     Anemia:  -Trend CBC     DVT PPx: Subcu heparin     Dispo: Inpatient, likely 3 to 4 days anticipating need for skilled placement postoperatively

## 2025-03-10 NOTE — SIGNIFICANT EVENT
Right arm swelling noted by nurse and IV removed. No erythema noted to RUE.  Pharmacy rec cold packs for infiltration of ancef and LR. Continue to monitor.

## 2025-03-10 NOTE — PROGRESS NOTES
Jabari Dumas is a 84 y.o. male on day 2 of admission presenting with Closed left hip fracture, initial encounter.      Subjective   Patient seen and examined.  Awake/alert/oriented to self only.  Complaining of pain to his lower extremity.  No chest pain or shortness of breath.  Respirations even unlabored.  No nausea or vomiting.       Objective     Last Recorded Vitals  /58   Pulse 68   Temp 36.8 °C (98.2 °F) (Temporal)   Resp 20   Wt 87.3 kg (192 lb 7.4 oz)   SpO2 95%   Intake/Output last 3 Shifts:    Intake/Output Summary (Last 24 hours) at 3/10/2025 1450  Last data filed at 3/10/2025 1325  Gross per 24 hour   Intake 1304 ml   Output 450 ml   Net 854 ml       Admission Weight  Weight: 79.4 kg (175 lb) (03/08/25 1125)    Daily Weight  03/09/25 : 87.3 kg (192 lb 7.4 oz)    Image Results  XR pelvis 1-2 views  Narrative: Interpreted By:  Kenneth Chavez,   STUDY:  Pelvis dated  3/9/2025.      INDICATION:  Signs/Symptoms:sp left hip hemiarthroplasty      COMPARISON:  Chest dated 03/08/2025      ACCESSION NUMBER(S):  [id number].      ORDERING CLINICIAN:  ZOEY CASSIDY.      TECHNIQUE:  One view(s) of the pelvis.      FINDINGS:  No fracture or dislocation is evident. There is placement of a  bipolar hemiarthroplasty of the left hip. Hardware is intact as  visualized. Mild degenerative changes seen of the right hip.  Degenerative changes seen of the spine. Subcutaneous emphysema and  edema are seen of the left hip from recent surgery.      Impression: No osseous injury or hardware complication is evident.      MACRO:  None      Signed by: Kenneth Chavez 3/9/2025 1:00 PM  Dictation workstation:   [id number]  FL less than 1 hour  These images are not reportable by radiology and will not be interpreted   by  Radiologists.      Physical Exam  Vitals reviewed.   Constitutional:       Appearance: Normal appearance.   HENT:      Head: Normocephalic and atraumatic.   Eyes:      Extraocular Movements:  Extraocular movements intact.      Conjunctiva/sclera: Conjunctivae normal.   Cardiovascular:      Rate and Rhythm: Normal rate and regular rhythm.   Pulmonary:      Effort: Pulmonary effort is normal.      Breath sounds: No wheezing, rhonchi or rales.      Comments: Breath sounds clear, diminished bilaterally  Abdominal:      General: Bowel sounds are normal.      Palpations: Abdomen is soft.      Tenderness: There is no abdominal tenderness.   Skin:     General: Skin is warm and dry.   Neurological:      General: No focal deficit present.      Mental Status: He is alert. He is disoriented.         Relevant Results      Lab Results   Component Value Date    GLUCOSE 97 03/10/2025    CALCIUM 8.3 (L) 03/10/2025     03/10/2025    K 3.6 03/10/2025    CO2 27 03/10/2025     03/10/2025    BUN 34 (H) 03/10/2025    CREATININE 1.54 (H) 03/10/2025      Lab Results   Component Value Date    WBC 10.5 03/10/2025    HGB 11.9 (L) 03/10/2025    HCT 37.5 (L) 03/10/2025    MCV 90 03/10/2025     03/10/2025    XR pelvis 1-2 views  Result Date: 3/9/2025  No osseous injury or hardware complication is evident.   MACRO: None   Signed by: Kenneth Chavez 3/9/2025 1:00 PM Dictation workstation:   [id number]    FL less than 1 hour  Result Date: 3/9/2025  These images are not reportable by radiology and will not be interpreted by  Radiologists.    XR forearm right 2 views  Result Date: 3/8/2025  No obvious fracture is evident although these radiographs are significantly limited. Dedicated wrist radiographs are recommended when the patient's clinical condition allows. Alternatively CT can be considered.   Signed by: Kenneth Chavez 3/8/2025 2:39 PM Dictation workstation:   [id number]    CT chest abdomen pelvis wo IV contrast  Result Date: 3/8/2025  Evaluation is significantly limited secondary to motion artifact, beam hardening artifact, and lack of IV contrast. Displaced angulated fracture through the left femoral neck.  Questionable fracture of the distal radius on the right. Evaluation for additional fractures is significantly limited secondary to the motion artifact. Consolidative opacities in both lower lobes with areas of mucous plugging. Moderate hiatal hernia. Fluid in the esophagus may relate to reflux or esophageal dysmotility. Nonobstructive nephrolithiasis. Additional incidental findings as above.   MACRO: None.   Signed by: Quinn Wallace 3/8/2025 2:06 PM Dictation workstation:   [id number]    CT lumbar spine wo IV contrast  Result Date: 3/8/2025  No evidence of an acute fracture. Degenerative changes.   MACRO: None.   Signed by: Quinn Wallace 3/8/2025 1:44 PM Dictation workstation:   [id number]    CT thoracic spine wo IV contrast  Result Date: 3/8/2025  Within the limits of the exam, there is no evidence of an acute fracture. The T7 and T8 levels could not be adequately evaluated. Degenerative changes.   MACRO: None.   Signed by: Quinn Wallace 3/8/2025 1:41 PM Dictation workstation:   [id number]    CT cervical spine wo IV contrast  Result Date: 3/8/2025  Examination is nondiagnostic for evaluation of a cervical spine fracture secondary to significant motion artifact.   MACRO: None.   Signed by: Quinn Wallace 3/8/2025 1:29 PM Dictation workstation:   [id number]    CT head wo IV contrast  Result Date: 3/8/2025  No evidence of an acute intracranial process.   MACRO: None.   Signed by: Quinn Wallace 3/8/2025 1:26 PM Dictation workstation:   [id number]    XR pelvis 1-2 views  Result Date: 3/8/2025  Displaced subcapital fracture of the left femoral neck.     MACRO: None   Signed by: Manny James 3/8/2025 12:37 PM Dictation workstation:   [id number]    XR femur left 2+ views  Result Date: 3/8/2025  Displaced subcapital fracture of the left femoral neck.     MACRO: None   Signed by: Manny James 3/8/2025 12:37 PM Dictation workstation:   [id number]    XR chest 1 view  Result Date: 3/8/2025  Persistent elevation of  the left hemidiaphragm.   Faint bibasilar infiltrates and atelectasis. Continued follow-up is recommended.   Borderline cardiomegaly with mild central vascular congestion.     MACRO: None   Signed by: Manny James 3/8/2025 12:34 PM Dictation workstation:   [id number]             Assessment/Plan                  Assessment & Plan  Closed left hip fracture, initial encounter    Chronic retention of urine    Stage 3b chronic kidney disease (Multi)    Pneumonia    Peptic ulcer disease    Benign hypertension    Hyperlipidemia    Dementia with behavioral disturbance (Multi)    Difficulty in swallowing    Chronic obstructive pulmonary disease (Multi)    Chronic combined systolic and diastolic congestive heart failure    Benign prostatic hyperplasia with urinary obstruction    Atherosclerosis of coronary artery    Anemia    Closed fracture of neck of left femur  Jabari Dumas is an 84 y.o. male with dementia presented to the [facility] ED from his memory care unit due to sudden onset left leg pain.     Acute displaced left femoral neck fracture:  -orthopedics consulted; performed left hip hemiarthroplasty on 3/9  -pain mgmt and bowel regimen   -PT/OT   -Perioperative cefazolin per orthopedics     Bibasilar pneumonia: Suspicious for aspiration pneumonia  COPD:  -Azithromycin complete, continue ceftriaxone  -Respiratory culture, Legionella and strep antigens, procalcitonin pending  -Bronchial hygiene, incentive spirometry, DuoNebs  -SLP consulted  -Oxygen, wean as tolerated    Acute cystitis  UA positive, culture growing enteric bacilli  On ceftriaxone, follow culture       Chronic kidney disease  ARAM resolved, baseline creatinine is around 1.3-1.5  -Hold home Lasix  -Cautious IV hydration; monitor volume status  -Trend renal function and electrolytes    Chronic diastolic heart failure  Lasix currently on hold, resume when able  Appears euvolemic  Monitor fluid status close  Daily weights  Strict I's and O's      CAD:  Hyperlipidemia:  Non-MI troponin elevation: Due to above  -Continue statin perioperatively     Dementia:  -Continue fluoxetine, trazodone     BPH with history of urinary retention:  Multiple asymptomatic renal calculi:  -Continue finasteride, tamsulosin  -Urinalysis positive, culture growing enteric bacilli  -Bladder scans to assess for urinary retention     GERD:  History of PUD:  Esophageal reflux and hiatal hernia on CT:  -PPI     Anemia:  -Trend CBC     DVT PPx: Subcu heparin     Dispo: Inpatient, likely 3 to 4 days anticipating need for skilled placement postoperatively                Lin Velazquez, APRN-CNP

## 2025-03-10 NOTE — CARE PLAN
Problem: Pain  Goal: Takes deep breaths with improved pain control throughout the shift  Outcome: Progressing  Goal: Turns in bed with improved pain control throughout the shift  Outcome: Progressing  Goal: Walks with improved pain control throughout the shift  Outcome: Progressing  Goal: Performs ADL's with improved pain control throughout shift  Outcome: Progressing  Goal: Participates in PT with improved pain control throughout the shift  Outcome: Progressing  Goal: Free from opioid side effects throughout the shift  Outcome: Progressing  Goal: Free from acute confusion related to pain meds throughout the shift  Outcome: Progressing     Problem: Safety - Adult  Goal: Free from fall injury  Outcome: Progressing     Problem: Discharge Planning  Goal: Discharge to home or other facility with appropriate resources  Outcome: Progressing     Problem: Skin  Goal: Participates in plan/prevention/treatment measures  Outcome: Progressing  Flowsheets (Taken 3/10/2025 0327)  Participates in plan/prevention/treatment measures: Elevate heels  Goal: Prevent/manage excess moisture  Outcome: Progressing  Flowsheets (Taken 3/10/2025 0327)  Prevent/manage excess moisture: Cleanse incontinence/protect with barrier cream  Goal: Prevent/minimize sheer/friction injuries  Outcome: Progressing  Flowsheets (Taken 3/10/2025 0327)  Prevent/minimize sheer/friction injuries: Turn/reposition every 2 hours/use positioning/transfer devices  Goal: Promote/optimize nutrition  Outcome: Progressing  Flowsheets (Taken 3/10/2025 0327)  Promote/optimize nutrition: Assist with feeding  Goal: Promote skin healing  Outcome: Progressing  Flowsheets (Taken 3/10/2025 0327)  Promote skin healing: Turn/reposition every 2 hours/use positioning/transfer devices   The patient's goals for the shift include      The clinical goals for the shift include pain control

## 2025-03-10 NOTE — PROGRESS NOTES
03/10/25 0916   Discharge Planning   Living Arrangements Other (Comment)  ([facility])   Support Systems Family members  (Brother, Gordo, is POA.)   Assistance Needed Patient is from [facility]. Per facility patient is A&Ox 1-2 at baseline, is on room air, ambulated with staff assist prior to fracture, is dependent of personal care and is incontinent. Able to feed self with set up assistance and cues. Patient is POD #1 and PT/OT evaluations are pending. If patient returns to facility SKILLED vs LTC then he will need three inpatient midnights to transition SKILLED under his Medicare insurance.   Type of Residence Nursing home/residential care   Do you have animals or pets at home? No   Who is requesting discharge planning? Provider   Home or Post Acute Services Post acute facilities (Rehab/SNF/etc)   Type of Post Acute Facility Services Long term care   Expected Discharge Disposition Inter   Does the patient need discharge transport arranged? Yes   RoundTrip coordination needed? Yes   Has discharge transport been arranged? No   Financial Resource Strain   How hard is it for you to pay for the very basics like food, housing, medical care, and heating? Pt Unable   Housing Stability   In the last 12 months, was there a time when you were not able to pay the mortgage or rent on time? Pt Unable   At any time in the past 12 months, were you homeless or living in a shelter (including now)? Pt Unable   Transportation Needs   In the past 12 months, has lack of transportation kept you from medical appointments or from getting medications? Pt Unable   In the past 12 months, has lack of transportation kept you from meetings, work, or from getting things needed for daily living? Pt Unable

## 2025-03-10 NOTE — PROGRESS NOTES
Physical Therapy    Physical Therapy Evaluation    Patient Name: Jabari Dumas  MRN: [id number]  Department: [address]  Room: [address] [place] [address]  Today's Date: 3/10/2025   Time Calculation  Start Time: 1403  Stop Time: 1424  Time Calculation (min): 21 min    Assessment/Plan   PT Assessment  PT Assessment Results: Decreased range of motion, Decreased strength, Decreased endurance, Impaired balance, Decreased mobility, Decreased cognition, Impaired judgement, Decreased safety awareness, Pain  Rehab Prognosis: Poor  Barriers to Discharge Home: Caregiver assistance, Cognition needs, Physical needs  Caregiver Assistance: Caregiver assistance needed per identified barriers - however, level of patient's required assistance exceeds assistance available at home  Cognition Needs: 24hr supervision for safety awareness needed, Medication and/or medical management daily assist needed, Cognition-related high falls risk, Insight of patient limited regarding functional ability/needs  Physical Needs: 24hr mobility assistance needed, 24hr ADL assistance needed, High falls risk due to function or environment  Evaluation/Treatment Tolerance: Patient limited by pain  Medical Staff Made Aware: Yes  End of Session Communication: Bedside nurse  End of Session Patient Position: Up in chair, Alarm on    Assessment:   Pt is an 83 y/o M presenting for PT eval POD#1 from L hip hemiarthroplasty. Pt is currently A&O x1 and exhibits high pain behaviors with mobility. Pt currently requires assist x2 for bed mobility, transfers, and stepping towards the chair, which is far below his reported baseline. Recommend moderate intensity PT to increase ROM, strength, balance, and independence with mobility.       IP OR SWING BED PT PLAN  Inpatient or Swing Bed: Inpatient  PT Plan  Treatment/Interventions: Bed mobility, Transfer training, Gait training, Strengthening, Endurance training, Positioning  PT Plan: Ongoing PT  PT Frequency: 3 times per week  PT Discharge  Recommendations: Moderate intensity level of continued care, 24 hr supervision due to cognition  Equipment Recommended upon Discharge:  (TBD at next site of care)  PT Recommended Transfer Status: Total assist (x2)  PT - OK to Discharge: Yes    Subjective   General Visit Information:  General  Reason for Referral: Pt is an 85 yo M presenting from [facility] at [facility] on 3/8/25 with sudden onset of LLE pain. Family reports multiple recent falls. Imaging showed bilateral lower lobe pneumonia with mucous plugging, displaced left femoral neck fx, possible pelvic fx, and possible distal right radius fx. Pt now s/p hemiarthroplasty of L hip with Dr. Buckley on 3/9/25. PT consulted for postop care.  Past Medical History Relevant to Rehab: dementia, sick sinus syndrome (s/p PPM), HTN, CAD (s/p CABG 2017), chronic diastolic heart failure, COPD, CKD 3A, BPH with h/o urinary retention, PUD complicated by remote GI bleed  Family/Caregiver Present: No  Co-Treatment: OT  Co-Treatment Reason: To maximize pt safety and functional outcomes  Prior to Session Communication: Bedside nurse  Patient Position Received: Bed, 3 rail up, Alarm off, caregiver present (nursing staff present, assisting with pericare)  General Comment: Pt pleasantly confused.  Home Living:  Home Living  Type of Home: Long Term Care facility ([facility])  Lives With: Alone  Prior Level of Function:  Prior Function Per Pt/Caregiver Report  Level of [place]: Unable to asses at this time  ADL Assistance: Needs assistance (per EMR, pt is dependent for ADLs, incontinent)  Homemaking Assistance: Needs assistance (staff completes)  Ambulatory Assistance: Needs assistance (per EMR, assist x1 for short ambulation distances)  Precautions:  Precautions  LE Weight Bearing Status: Weight Bearing as Tolerated (BLE)  Medical Precautions: Fall precautions  Post-Surgical Precautions: Left hip precautions (Anterior)  Precautions  Comment: ? distal R radius fx, tele, Masimo      Date/Time Vitals Session Patient Position Pulse Resp SpO2 BP MAP (mmHg)    03/10/25 1541 --  --  82  18  95 %  115/51  72                 Objective   Pain:  Pain Assessment  Pain Assessment: Osorio-Baker FACES  Osorio-Baker FACES Pain Rating: Hurts whole lot  Pain Type: Surgical pain  Pain Location: Hip  Pain Orientation: Left  Pain Descriptors:  (unable to describe)  Pain Interventions: Cold applied, Repositioned, Ambulation/increased activity, Elevated  Response to Interventions: Resting quietly, Content/relaxed  Cognition:  Cognition  Overall Cognitive Status: Impaired at baseline  Orientation Level: Disoriented to place, Disoriented to time, Disoriented to situation    General Assessments:  General Observation  General Observation: Infiltrated IV on RUE               Activity Tolerance  Endurance: Tolerates less than 10 min exercise, no significant change in vital signs            Static Sitting Balance  Static Sitting-Balance Support: Feet supported, Bilateral upper extremity supported  Static Sitting-Level of Assistance: Contact guard  Static Sitting-Comment/Number of Minutes: EOB    Static Standing Balance  Static Standing-Balance Support: Bilateral upper extremity supported  Static Standing-Level of Assistance: Moderate assistance (x2)  Static Standing-Comment/Number of Minutes: arm-in-arm technique  Functional Assessments:  Bed Mobility  Bed Mobility: Yes  Bed Mobility 1  Bed Mobility 1: Supine to sitting  Level of Assistance 1: Dependent, +2  Bed Mobility Comments 1: sit>spin transfer with aida pad  Bed Mobility 2  Bed Mobility  2: Rolling right, Rolling left  Level of Assistance 2: Maximum assistance  Bed Mobility Comments 2: during perihygiene    Transfers  Transfer: Yes  Transfer 1  Technique 1: Sit to stand, Stand to sit  Transfer Level of Assistance 1: Moderate assistance, +2, Arm in arm assistance  Trials/Comments 1: VC for hand  placement    Ambulation/Gait Training  Ambulation/Gait Training Performed: Yes  Ambulation/Gait Training 1  Surface 1: Level tile  Device 1: No device  Assistance 1: Moderate assistance, Arm in arm assistance (x2)  Quality of Gait 1: Narrow base of support, Soft knee(s), Forward flexed posture, Decreased step length, Shuffling gait  Comments/Distance (ft) 1: 5' to recliner; verbal/tactile cues to initiate stepping  Extremity/Trunk Assessments:  RLE   RLE : Exceptions to WFL (grossly 2+/5)  LLE   LLE : Exceptions to WFL (decreased L hip ROM, LLE weakness s/p adan L hip)  Outcome Measures:  [facility] Basic Mobility  Turning from your back to your side while in a flat bed without using bedrails: Total  Moving from lying on your back to sitting on the side of a flat bed without using bedrails: Total  Moving to and from bed to chair (including a wheelchair): Total  Standing up from a chair using your arms (e.g. wheelchair or bedside chair): Total  To walk in hospital room: Total  Climbing 3-5 steps with railing: Total  Basic Mobility - Total Score: 6    Encounter Problems       Encounter Problems (Active)       Endurance        Patient will tolerate sitting upright in recliner for 20 minutes in order to eat meals OOB.        Start:  03/10/25    Expected End:  03/24/25               Mobility       Patient will ambulate 15' with FWW and Jackson in order to complete toileting and hygiene activities with greater ease.       Start:  03/10/25    Expected End:  03/24/25               PT Transfers       Patient will transfer to and from sit to supine with Jackson.        Start:  03/10/25    Expected End:  03/24/25            Patient will transfer sit to and from stand with FWW and Jackson.        Start:  03/10/25    Expected End:  03/24/25                   Education Documentation  Precautions, taught by Cindy Boston, PT at 3/10/2025  3:56 PM.  Learner: Patient  Readiness: Acceptance  Method: Explanation, Demonstration  Response: No  Evidence of Learning  Comment: Bed mobility, transfers, gait, positioning    Body Mechanics, taught by Cindy Boston, PT at 3/10/2025  3:56 PM.  Learner: Patient  Readiness: Acceptance  Method: Explanation, Demonstration  Response: No Evidence of Learning  Comment: Bed mobility, transfers, gait, positioning    Mobility Training, taught by Cindy Boston, PT at 3/10/2025  3:56 PM.  Learner: Patient  Readiness: Acceptance  Method: Explanation, Demonstration  Response: No Evidence of Learning  Comment: Bed mobility, transfers, gait, positioning    Education Comments  No comments found.

## 2025-03-10 NOTE — PROGRESS NOTES
"Jabari Dumas is a 84 y.o. male on day 2 of admission presenting with Closed left hip fracture, initial encounter.    Subjective   No acute overnight events.  Pain controlled.         Objective     Physical Exam  Vitals reviewed.   Constitutional:       Appearance: Normal appearance.   HENT:      Head: Normocephalic and atraumatic.   Eyes:      Conjunctiva/sclera: Conjunctivae normal.      Pupils: Pupils are equal, round, and reactive to light.   Cardiovascular:      Rate and Rhythm: Normal rate and regular rhythm.      Pulses: Normal pulses.   Pulmonary:      Effort: Pulmonary effort is normal.   Abdominal:      Palpations: Abdomen is soft.   Musculoskeletal:      Cervical back: Neck supple.      Comments: Left hip dressing without strikethrough, thigh and calf supple, leg and foot warm and well perfused.  Left quad strength 4/5, SILT S/S/SPN/DPN distributions, no foot drop, fully flexes and extends left ankle and foot    Skin:     General: Skin is warm and dry.      Capillary Refill: Capillary refill takes less than 2 seconds.   Neurological:      Mental Status: He is alert. Mental status is at baseline. He is disoriented.       Last Recorded Vitals  Blood pressure 115/61, pulse 74, temperature 36.8 °C (98.2 °F), temperature source Temporal, resp. rate 20, height 1.82 m (5' 11.65\"), weight 87.3 kg (192 lb 7.4 oz), SpO2 95%.  Intake/Output last 3 Shifts:  I/O last 3 completed shifts:  In: 2246.5 (25.7 mL/kg) [P.O.:684; I.V.:400 (4.6 mL/kg); IV Piggyback:1162.5]  Out: 550 (6.3 mL/kg) [Urine:550 (0.2 mL/kg/hr)]  Weight: 87.3 kg     Relevant Results  XR pelvis 1-2 views    Result Date: 3/9/2025  Interpreted By:  Kenneth Chavez, STUDY: Pelvis dated  3/9/2025.   INDICATION: Signs/Symptoms:sp left hip hemiarthroplasty   COMPARISON: Chest dated 03/08/2025   ACCESSION NUMBER(S): [id number].   ORDERING CLINICIAN: ZOEY CASSIDY.   TECHNIQUE: One view(s) of the pelvis.   FINDINGS: No fracture or dislocation is evident. " There is placement of a bipolar hemiarthroplasty of the left hip. Hardware is intact as visualized. Mild degenerative changes seen of the right hip. Degenerative changes seen of the spine. Subcutaneous emphysema and edema are seen of the left hip from recent surgery.       No osseous injury or hardware complication is evident.   MACRO: None   Signed by: Kenneth Chavez 3/9/2025 1:00 PM Dictation workstation:   [id number]    FL less than 1 hour    Result Date: 3/9/2025  These images are not reportable by radiology and will not be interpreted by  Radiologists.    XR forearm right 2 views    Result Date: 3/8/2025  Interpreted By:  Kenneth Chavez, STUDY: XR FOREARM RIGHT 2 VIEWS; 3/8/2025 2:31 pm   INDICATION: Signs/Symptoms:questionable fx on CT?.   COMPARISON: Correlation is made with same date CT.   ACCESSION NUMBER(S): [id number]   ORDERING CLINICIAN: EDDI MCNEILL   TECHNIQUE: Two views of the right forearm.   FINDINGS: Exam is limited due to positioning and demineralization of the bones. No obvious fracture of the distal radius is evident. The carpal bones are not well assessed for injury due to positioning. Vascular calcifications are seen over the soft tissues.       No obvious fracture is evident although these radiographs are significantly limited. Dedicated wrist radiographs are recommended when the patient's clinical condition allows. Alternatively CT can be considered.   Signed by: Kenneth Chavez 3/8/2025 2:39 PM Dictation workstation:   [id number]    CT chest abdomen pelvis wo IV contrast    Result Date: 3/8/2025  Interpreted By:  Quinn Wallace, STUDY: CT CHEST ABDOMEN PELVIS WO CONTRAST;  3/8/2025 1:00 pm   INDICATION: Signs/Symptoms:chest pain.     COMPARISON: CT abdomen and pelvis dated 05/02/2021   ACCESSION NUMBER(S): [id number]   ORDERING CLINICIAN: EDDI MCNEILL   TECHNIQUE: Helical data acquisition of the chest, abdomen and pelvis was obtained without the use of IV contrast. Images were  reformatted in axial, coronal, and sagittal planes.   FINDINGS: POTENTIAL LIMITATIONS OF THE STUDY:    Motion artifact. Beam hardening artifact from the upper extremities. Lack of IV contrast.   CHEST:   HEART AND VESSELS: There are atherosclerotic calcifications of the aorta and its branches. Mild ectasia of the ascending aorta up to 3.8 cm.   The heart is unchanged in size.   No pericardial effusion is seen.   MEDIASTINUM AND KIERRA, LOWER NECK AND AXILLA: Thyroid is enlarged, especially on the left, and contains nonspecific hypoattenuating nodules, not adequately characterized on CT. Ultrasound recommended for further evaluation.   No evidence of thoracic lymphadenopathy by CT criteria.   There is a moderate-sized hiatal hernia. Esophagus is fluid-filled, possibly related to reflux or esophageal dysmotility.   LUNGS AND AIRWAYS: The trachea and central airways are patent. No endobronchial lesion.   Consolidative opacities are noted within both lower lobes. There is mucous plugging within both lower lobes. No sizable effusion. No evidence of a pneumothorax. Scattered areas of scarring/atelectasis in the aerated portions of the lungs. Evaluation is suboptimal secondary to motion artifact.   CHEST WALL AND OSSEOUS STRUCTURES: Evaluation for fractures, especially rib fractures, is precluded by significant motion artifact. Degenerative changes. Postsurgical changes relating to midline sternotomy.     ABDOMEN:   LIVER: Hypoattenuating lesion in the lateral segment of left hepatic lobe measuring 1.2 cm, not adequately characterize secondary to its small size and lack of IV contrast but statistically most likely a cyst.   BILE DUCTS: Not abnormally dilated.   GALLBLADDER: No calcified stones. No wall thickening.   PANCREAS: Appears unremarkable.   SPLEEN: Appears unremarkable.   ADRENAL GLANDS: Appear unremarkable.   KIDNEYS, URETERS, AND BLADDER: Staghorn type calculus noted within the inferior pole of the left kidney,  difficult to measure secondary to the motion artifact but at least 2.0 cm in size. Additional smaller nonobstructing calculi in the left kidney measuring 4 mm or less in size. Nonobstructing calculi in the right kidney measuring proximally 2 mm. Questionable cyst in the inferior pole of the right kidney measuring approximately 2.5 cm. No definite hydronephrosis. Calculus within the urinary bladder posteriorly measuring up to 2.4 cm.   BOWEL: The small and large bowel are normal in caliber and demonstrate no wall thickening.  There is no evidence of a bowel obstruction. Appendix is normal in caliber.  Although CT has limited sensitivity and specificity for gastric pathology, the stomach appears grossly unremarkable.   RETROPERITONEUM, VESSELS: There is no aneurysmal dilatation of the abdominal aorta. The IVC is within normal limits.  No pathologically enlarged retroperitoneal lymph nodes are noted.   PERITONEUM: No ascites or free air, no fluid collection.  No ascites or loculated fluid collection noted.  No pathologically enlarged mesenteric lymph nodes are identified.   ABDOMINAL WALL, SOFT TISSUES: No acute process.   SKELETON: There is a fracture through the left femoral neck resulting in varus angulation. There is lateral and superior displacement of the distal fracture fragment. There may be additional fractures in the pelvis which are not well evaluated secondary to motion artifact, possibly through the right superior pubic ramus. Questionable fracture of the distal radius on the right.       Evaluation is significantly limited secondary to motion artifact, beam hardening artifact, and lack of IV contrast. Displaced angulated fracture through the left femoral neck. Questionable fracture of the distal radius on the right. Evaluation for additional fractures is significantly limited secondary to the motion artifact. Consolidative opacities in both lower lobes with areas of mucous plugging. Moderate hiatal  hernia. Fluid in the esophagus may relate to reflux or esophageal dysmotility. Nonobstructive nephrolithiasis. Additional incidental findings as above.   MACRO: None.   Signed by: Quinn Wallace 3/8/2025 2:06 PM Dictation workstation:   [id number]    CT lumbar spine wo IV contrast    Result Date: 3/8/2025  Interpreted By:  Quinn Wallace, STUDY: CT LUMBAR SPINE WO IV CONTRAST;  3/8/2025 1:00 pm   INDICATION: Signs/Symptoms:pain.     COMPARISON: None.   ACCESSION NUMBER(S): [id number]   ORDERING CLINICIAN: EDDI MCNEILL   TECHNIQUE: Unenhanced axial images were obtained through the lumbar spine. The axial data was utilized to reconstruct images in sagittal and coronal planes. There is motion artifact which degrades the images and limits the evaluation.   FINDINGS: Within the limits of the motion artifact, there is no evidence of an acute fracture. Degenerative changes include disc space narrowing, endplate spurring, endplate sclerosis, posterior disc osteophyte complexes, and facet hypertrophy. There is mild subluxation at a few levels which is most likely related to facet degenerative disease. Facet joints demonstrate a normal alignment. No evidence of spondylolysis. No suspicious lytic or blastic lesions. No paraspinal hematoma is evident.       No evidence of an acute fracture. Degenerative changes.   MACRO: None.   Signed by: Quinn Wallace 3/8/2025 1:44 PM Dictation workstation:   [id number]    CT thoracic spine wo IV contrast    Result Date: 3/8/2025  Interpreted By:  Quinn Wallace, STUDY: CT THORACIC SPINE WO IV CONTRAST;  3/8/2025 1:00 pm   INDICATION: Signs/Symptoms:pain.     COMPARISON: None.   ACCESSION NUMBER(S): [id number]   ORDERING CLINICIAN: EDDI MCNEILL   TECHNIQUE: Unenhanced axial images were obtained through the thoracic spine. The axial data was utilized to reconstruct images in sagittal and coronal planes. There is motion artifact which degrades the images and limits the evaluation, especially  through the T7 and T8 levels.   FINDINGS: Within the limits of the examination, there is no evidence of an acute fracture. However, the T7-T8 level with could not be adequately evaluated. Degenerative changes include disc space narrowing, endplate spurring, endplate sclerosis, and facet hypertrophy. Mild retrolisthesis of T7 on T8 may relate to the motion artifact. Facet joints demonstrate a normal alignment. No paraspinal hematoma is evident. No suspicious lytic or blastic lesions.       Within the limits of the exam, there is no evidence of an acute fracture. The T7 and T8 levels could not be adequately evaluated. Degenerative changes.   MACRO: None.   Signed by: Quinn Wallace 3/8/2025 1:41 PM Dictation workstation:   [id number]    CT cervical spine wo IV contrast    Result Date: 3/8/2025  Interpreted By:  Quinn Wallace, STUDY: CT CERVICAL SPINE WO IV CONTRAST;  3/8/2025 1:00 pm   INDICATION: Signs/Symptoms:fall.     COMPARISON: 11/26/2021   ACCESSION NUMBER(S): [id number]   ORDERING CLINICIAN: EDDI MCNEILL   TECHNIQUE: Unenhanced axial images were obtained through the cervical spine. The axial data was utilized to reconstruct images in sagittal and coronal planes. There is severe motion artifact which significantly degrades the images and precludes evaluation for a fracture.   FINDINGS: Severe motion artifact which significantly degrades the images and precludes evaluation for a fracture. There are multilevel degenerative changes which include disc space narrowing, endplate spurring, endplate sclerosis, uncovertebral spurring, posterior disc osteophyte complexes, and facet hypertrophy. Subluxation at several levels most likely relates to facet degenerative disease. Alignment is not significantly changed when compared to the previous study.       Examination is nondiagnostic for evaluation of a cervical spine fracture secondary to significant motion artifact.   MACRO: None.   Signed by: Quinn Wallace 3/8/2025 1:29  PM Dictation workstation:   [id number]    CT head wo IV contrast    Result Date: 3/8/2025  Interpreted By:  Quinn Wallace, STUDY: CT HEAD WO IV CONTRAST;  3/8/2025 1:00 pm   INDICATION: Signs/Symptoms:fall hit head.     COMPARISON: 06/15/2022   ACCESSION NUMBER(S): [id number]   ORDERING CLINICIAN: EDDI MCNEILL   TECHNIQUE: Unenhanced images were obtained through the brain.   FINDINGS: There is atrophy resulting in prominence of the ventricles and sulci. There are areas of decreased attenuation within the white matter which are nonspecific but are commonly associated with small vessel ischemic disease. There is no mass effect or midline shift. No acute intracranial hemorrhage is identified. No extra-axial fluid collections are seen. No intraparenchymal mass lesions are identified.  Bone windows demonstrate no evidence of an acute calvarial fracture. Mild mucosal thickening in the paranasal sinuses.       No evidence of an acute intracranial process.   MACRO: None.   Signed by: Quinn Wallace 3/8/2025 1:26 PM Dictation workstation:   [id number]    XR pelvis 1-2 views    Result Date: 3/8/2025  Interpreted By:  Manny James, STUDY: XR FEMUR LEFT 2+ VIEWS; XR PELVIS 1-2 VIEWS; ;  3/8/2025 12:30 pm   INDICATION: Signs/Symptoms:pain; Signs/Symptoms:fall.   COMPARISON: None.   ACCESSION NUMBER(S): [id number]; [id number]   ORDERING CLINICIAN: EDDI MCNEILL   TECHNIQUE: Single AP radiograph the pelvis is combine with 5 radiographs of the left femur.   FINDINGS: The osseous structures are diffusely demineralized. There is a displaced fracture of the subcapital left femoral neck. The distal fragment is displaced superiorly 4 cm. There is mild medial angulation of the distal fragment. The femoral head fragment aligns normally with the acetabulum.   There are mild osteoarthritic changes of both hips.   The osseous structures are diffusely and severely demineralized.   There severe multilevel discogenic degenerative  changes of the lumbar spine with levoscoliosis. There are osteoarthritic changes of both sacroiliac joints.   There are diffuse atherosclerotic arterial calcifications of the left groin and thigh.       Displaced subcapital fracture of the left femoral neck.     MACRO: None   Signed by: Manny James 3/8/2025 12:37 PM Dictation workstation:   [id number]    XR femur left 2+ views    Result Date: 3/8/2025  Interpreted By:  Manny James, STUDY: XR FEMUR LEFT 2+ VIEWS; XR PELVIS 1-2 VIEWS; ;  3/8/2025 12:30 pm   INDICATION: Signs/Symptoms:pain; Signs/Symptoms:fall.   COMPARISON: None.   ACCESSION NUMBER(S): [id number]; [id number]   ORDERING CLINICIAN: EDDI MCNEILL   TECHNIQUE: Single AP radiograph the pelvis is combine with 5 radiographs of the left femur.   FINDINGS: The osseous structures are diffusely demineralized. There is a displaced fracture of the subcapital left femoral neck. The distal fragment is displaced superiorly 4 cm. There is mild medial angulation of the distal fragment. The femoral head fragment aligns normally with the acetabulum.   There are mild osteoarthritic changes of both hips.   The osseous structures are diffusely and severely demineralized.   There severe multilevel discogenic degenerative changes of the lumbar spine with levoscoliosis. There are osteoarthritic changes of both sacroiliac joints.   There are diffuse atherosclerotic arterial calcifications of the left groin and thigh.       Displaced subcapital fracture of the left femoral neck.     MACRO: None   Signed by: Manny James 3/8/2025 12:37 PM Dictation workstation:   [id number]    XR chest 1 view    Result Date: 3/8/2025  Interpreted By:  Manny James, STUDY: XR CHEST 1 VIEW; ;  3/8/2025 12:30 pm   INDICATION: Signs/Symptoms:chest pain.   COMPARISON: 06/18/2022   ACCESSION NUMBER(S): [id number]   ORDERING CLINICIAN: EDDI MCNEILL   TECHNIQUE: A single AP radiograph of the chest is performed  and limited by patient positioning.   FINDINGS: Postoperative changes are again noted with midline sternotomy wires vascular clips. A dual lead intracardiac device is in place with lead tips overlying the right atrium and right ventricle.   There is persistent elevation left hemidiaphragm with suspected atelectasis in the left lung base, unchanged in the interval. There is a small component of infiltrate and atelectasis in the right lung base. The heart is at the upper limits normal in size. There is mild central vascular congestion which is unchanged. The osseous structures are diffusely demineralized. There is no pneumothorax.       Persistent elevation of the left hemidiaphragm.   Faint bibasilar infiltrates and atelectasis. Continued follow-up is recommended.   Borderline cardiomegaly with mild central vascular congestion.     MACRO: None   Signed by: Manny James 3/8/2025 12:34 PM Dictation workstation:   [id number]     Scheduled medications  acetaminophen, 650 mg, oral, q4h   Or  acetaminophen, 650 mg, nasogastric tube, q4h   Or  acetaminophen, 650 mg, rectal, q4h  atorvastatin, 40 mg, oral, Daily  azithromycin, 500 mg, oral, q24h YARELIS  cefTRIAXone, 2 g, intravenous, q24h  finasteride, 5 mg, oral, Daily  FLUoxetine, 10 mg, oral, Daily  heparin, 5,000 Units, subcutaneous, q8h  pantoprazole, 40 mg, intravenous, Daily  sennosides-docusate sodium, 2 tablet, oral, Nightly  tamsulosin, 0.4 mg, oral, Daily  traZODone, 50 mg, oral, Nightly      Continuous medications     PRN medications  PRN medications: famotidine, HYDROmorphone, HYDROmorphone, ipratropium-albuteroL, melatonin, ondansetron, oxygen  Results for orders placed or performed during the hospital encounter of 03/08/25 (from the past 24 hours)   Respiratory Culture/Smear    Specimen: SPUTUM; Fluid   Result Value Ref Range    Respiratory Culture/Smear Culture in progress     Gram Stain       Gram stain indicates specimen consists of lower respiratory  tract secretions.    Gram Stain No predominant organism    Renal Function Panel   Result Value Ref Range    Glucose 97 74 - 99 mg/dL    Sodium 137 136 - 145 mmol/L    Potassium 3.6 3.5 - 5.3 mmol/L    Chloride 101 98 - 107 mmol/L    Bicarbonate 27 21 - 32 mmol/L    Anion Gap 13 10 - 20 mmol/L    Urea Nitrogen 34 (H) 6 - 23 mg/dL    Creatinine 1.54 (H) 0.50 - 1.30 mg/dL    eGFR 44 (L) >60 mL/min/1.73m*2    Calcium 8.3 (L) 8.6 - 10.3 mg/dL    Phosphorus 3.9 2.5 - 4.9 mg/dL    Albumin 2.8 (L) 3.4 - 5.0 g/dL   Magnesium   Result Value Ref Range    Magnesium 2.18 1.60 - 2.40 mg/dL   CBC   Result Value Ref Range    WBC 10.5 4.4 - 11.3 x10*3/uL    nRBC 0.0 0.0 - 0.0 /100 WBCs    RBC 4.18 (L) 4.50 - 5.90 x10*6/uL    Hemoglobin 11.9 (L) 13.5 - 17.5 g/dL    Hematocrit 37.5 (L) 41.0 - 52.0 %    MCV 90 80 - 100 fL    MCH 28.5 26.0 - 34.0 pg    MCHC 31.7 (L) 32.0 - 36.0 g/dL    RDW 14.2 11.5 - 14.5 %    Platelets 360 150 - 450 x10*3/uL       Assessment/Plan   Assessment & Plan  Closed left hip fracture, initial encounter    Chronic retention of urine    Stage 3b chronic kidney disease (Multi)    Pneumonia    Peptic ulcer disease    Benign hypertension    Hyperlipidemia    Dementia with behavioral disturbance (Multi)    Difficulty in swallowing    Chronic obstructive pulmonary disease (Multi)    Chronic combined systolic and diastolic congestive heart failure    Benign prostatic hyperplasia with urinary obstruction    Atherosclerosis of coronary artery    Anemia    Closed fracture of neck of left femur    84 year old male POD 1 from left hemiarthroplasty    - AM labs reviewed-HgB stable 11.9  - ok for chemoprophylaxis, SCDs  - recommend 6 weeks of chemoprophylaxis can do ASA 81 mg BID if mobilizing - if not mobilizing, Heparin or Lovenox  - WBAT BLE  - PT/OT  - 24 hours Ancef completed  - pain control and remainder of care per primary  - follow up with Dr. Buckley in 2 weeks  - ortho will sign off, please secure chat with any  questions or concerns           I spent 30 minutes in the professional and overall care of this patient.    Georgina Clifton, APRN-CNP

## 2025-03-10 NOTE — PROGRESS NOTES
Speech-Language Pathology    Speech-Language Pathology Clinical Swallow Evaluation    Patient Name: Jabari Dumas  MRN: [id number]  : 1940  Today's Date: 03/10/25  Start Time: 902  Stop Time: 922  Time Calculation (min): 20 min      ASSESSMENT  Impressions:  Functional oral phase and no suspected pharyngeal phase dysphagia based on clinical swallow evaluation. He appears to be swallowing at his baseline.  Esophageal dysphagia could result in reflux with aspiration.   Prognosis: Good    PLAN    RECOMMENDATIONS:  Is MBSS recommended? No; no pharyngeal dysphagia suspected.  Solid consistency: Soft/bite-sized (IDDSI level 6)  Liquid consistency: Thin (IDDSI 0)  Medication administration: Crushed in puree    Compensatory swallow strategies:  Upright posture for oral intake  Reflux precautions  Reflux precautions: HOB >30 degrees at all times, fully upright to 90 degrees for PO intake and remain upright for 60 minutes afterward, slow rate, smaller/more frequent meals/snacks, stop eating at first sign of satiety.      Recommended frequency/duration:  Skilled SLP services recommended: No  SUBJECTIVE    PMHx relevant to rehab:   Closed left hip fracture, dementia, lower lobe infiltrates on CT.     Baseline diet of mechanical soft foods with thin liquids in LTC.     Jabari Dumas is an 84 y.o. male with dementia, sick sinus syndrome status post PPM, hypertension, hyperlipidemia, CAD status post CABG , chronic diastolic heart failure, COPD, CKD 3A, BPH with history of urinary retention, GERD and PUD complicated by remote GI bleed. He presented to the [facility] ED from his memory care unit due to sudden onset left leg pain. Family reported he has had multiple recent falls. Vitals in the ED showed hypotension and hypoxia. He was noted to have a shortened, externally rotated left leg. Labs showed improvement in chronic anemia, ARAM, hypokalemia, and elevated troponin stable on repeat. Full body motion-degraded CT  imaging showed bilateral lower lobe pneumonia with mucous plugging, displaced left femoral neck fracture, possible pelvic fractures, possible distal right radius fracture as well as several incidental findings including various cysts, thyroid enlargement, esophageal fluid, hiatal hernia, staghorn calculus in the left kidney, and a bladder stone.   Chief complaint: Pt was admitted on 3/8/25 due to   Chief Complaint   Patient presents with    Leg Pain     Pt sent in from NH with C/O left leg pain. On arrival Pt has left leg shorting and outward rotation with pain on movement.   . He was found to have Closed left hip fracture, initial encounter.    Relevant imaging results:  CT chest 3/8/25  LUNGS AND AIRWAYS:  The trachea and central airways are patent. No endobronchial lesion.      Consolidative opacities are noted within both lower lobes. There is  mucous plugging within both lower lobes. No sizable effusion. No  evidence of a pneumothorax. Scattered areas of scarring/atelectasis  in the aerated portions of the lungs. Evaluation is suboptimal  secondary to motion artifact.    General Visit Information:  SLP Received On: 03/10/25  Patient Class: Inpatient  Living Environment: Home  Ordering Physician: Dr Singh  Reason for Referral: concern for aspiration- lower lobe infiltrates  Prior to Session Communication: Bedside nurse    RN cleared pt to participate in session and reported that he has been confused, spitting out pills overnight. Additionally, nursing reported that the patient ate soft drinks, belching frequently after sipping.     Pt demonstrated limited ability to verbalize needs and wants. He moaned repetitively during the session. At times pointing to what he wanted (the water) and commenting on what he saw on TV  by pointing.       [employer] Diet: mechanical soft with thin liquids  Current Diet : soft and bite sized with thin liquids    Status at time of evaluation:  Pain Assessment  Pain Assessment: PAINAD  (Pain Assessment in Advanced Dementia Scale)  0-10 (Numeric) Pain Score: 9  Pt was awake, moaning, cooperative for 8/9 oral trials for session.  Orientation: Did not correctly report any orientation information.    Ability to follow functional commands: followed few simple commands with cues/ repetition of request.   Nutritional status: Appears well-nourished/no concerns    Respiratory status: Room air  Baseline Vocal Quality: Normal  Volitional Cough: Strong  Volitional Swallow: Within Functional Limits  Patient positioning: Pt refused fully upright positioning; partially reclined due to apparent pain      OBJECTIVE  Clinical swallow evaluation completed and consisted of limited oral motor assessment and limited PO trials (water, applesauce).    ORAL PHASE: Edentulous. Oral mucosa were pink, moist, and free of obvious lesions. Lingual strength and ROM were apparently functional. Labial strength/ROM were adequate. Labial seal was adequate. Mastication of regular solids was not tested. A/P transit and oral clearance were good.  Strong lip and tongue movement noted when he spit out the last of three tsp of applesauce with crushed meds given by the nurse during the session.     PHARYNGEAL PHASE: Laryngeal elevation was visualized or palpated with all trials, however adequacy of hyolaryngeal elevation/excursion cannot be determined at bedside. No immediate or delayed s/sx aspiration/penetration were observed with any consistencies.    Pt belched after every water swallow.     Was 3oz challenge administered: Yes; pt unable to successfully complete due to taking breaks. No overt s/sx aspiration were observed.      Treatment/Education:  Results and recommendations were relayed to: Bedside nurse  Education provided: No   Learner: Patient   Barriers to learning: Acuteness of illness barrier and Cognitive limitations barrier- no family present

## 2025-03-10 NOTE — CARE PLAN
The patient's goals for the shift include      The clinical goals for the shift include q2 turns, monitor pain, aspiration precautions      Problem: Pain  Goal: Takes deep breaths with improved pain control throughout the shift  Outcome: Progressing  Goal: Turns in bed with improved pain control throughout the shift  Outcome: Progressing  Goal: Walks with improved pain control throughout the shift  Outcome: Progressing  Goal: Performs ADL's with improved pain control throughout shift  Outcome: Progressing  Goal: Participates in PT with improved pain control throughout the shift  Outcome: Progressing  Goal: Free from opioid side effects throughout the shift  Outcome: Progressing  Goal: Free from acute confusion related to pain meds throughout the shift  Outcome: Progressing     Problem: Safety - Adult  Goal: Free from fall injury  Outcome: Progressing     Problem: Discharge Planning  Goal: Discharge to home or other facility with appropriate resources  Outcome: Progressing     Problem: Skin  Goal: Participates in plan/prevention/treatment measures  Outcome: Progressing  Flowsheets (Taken 3/10/2025 1043)  Participates in plan/prevention/treatment measures: Discuss with provider PT/OT consult  Goal: Prevent/manage excess moisture  Outcome: Progressing  Goal: Prevent/minimize sheer/friction injuries  Outcome: Progressing  Goal: Promote/optimize nutrition  Outcome: Progressing  Goal: Promote skin healing  Outcome: Progressing

## 2025-03-10 NOTE — PROGRESS NOTES
Occupational Therapy    Evaluation    Patient Name: Jabari Dumas  MRN: [id number]  Department: [address]  Room: [address] [place] [address]  Today's Date: 3/10/2025  Time Calculation  Start Time: 1402  Stop Time: 1423  Time Calculation (min): 21 min    Assessment  IP OT Assessment  OT Assessment: Pt presents with decreased ADL performance and impaired mobility. Will benefit from skilled OT services to increase functional outcomes  Prognosis: Fair  Barriers to Discharge Home: Caregiver assistance, Physical needs, Cognition needs  Caregiver Assistance: Caregiver assistance needed per identified barriers - however, level of patient's required assistance exceeds assistance available at home  Cognition Needs: 24hr supervision for safety awareness needed, Cognition-related high falls risk  Physical Needs: 24hr mobility assistance needed, 24hr ADL assistance needed  Evaluation/Treatment Tolerance: Patient tolerated treatment well  Medical Staff Made Aware: Yes  End of Session Communication: Bedside nurse, PCT/NA/CTA  End of Session Patient Position: Up in chair, Alarm on  Plan:  Treatment Interventions: ADL retraining, Functional transfer training, UE strengthening/ROM, Patient/family training, Equipment evaluation/education, Compensatory technique education  OT Frequency: 3 times per week  OT Discharge Recommendations: Moderate intensity level of continued care  Equipment Recommended upon Discharge:  (TBD)  OT Recommended Transfer Status: Assist of 2  OT - OK to Discharge: Yes    Subjective     General:  General  Reason for Referral: L hip fracture s/p L hemiarthroplasty; referred to OT for impaired ADL  Past Medical History Relevant to Rehab: dementia, sick sinus syndrome status post PPM, hypertension, hyperlipidemia, CAD status post CABG 2017, chronic diastolic heart failure, COPD, CKD 3A, BPH with history of urinary retention, GERD and PUD complicated by remote GI bleed  Co-Treatment: PT  Co-Treatment Reason: to maximize pt  outcomes  Prior to Session Communication: Bedside nurse  Patient Position Received: Bed, 3 rail up, Alarm off, caregiver present (nursing staff providing bed level hygiene)  General Comment: Pleasantly confused  Precautions:  LE Weight Bearing Status: Weight Bearing as Tolerated  Medical Precautions: Fall precautions (external catheter, masimo)  Post-Surgical Precautions: Left hip precautions (anterior hip precautions)  Precautions Comment: R arm xray: No obvious fracture is evident although these radiographs are  significantly limited.           Pain:  Pain Assessment  Pain Assessment: Osorio-Baker FACES  Osorio-Baker FACES Pain Rating: Hurts whole lot  Pain Type: Surgical pain  Pain Location: Hip  Pain Orientation: Left  Pain Interventions: Cold applied, Repositioned, Ambulation/increased activity, Elevated, Emotional support  Response to Interventions: Resting quietly, Provider notified    Objective   Cognition:  Orientation Level: Disoriented to place, Disoriented to time, Disoriented to situation           Home Living:  Type of Home: Assisted living ([facility])   Prior Function:  Receives Help From: Family, Personal care attendant  ADL Assistance: Needs assistance (Per EMR, pt is dependent for all ADL)  Ambulatory Assistance: Needs assistance (Per EMR, assisted for all mobility with FWW)    ADL:  Eating Assistance: Moderate  Grooming Assistance: Maximal  Bathing Assistance: Maximal  UE Dressing Assistance: Maximal  LE Dressing Assistance: Total  LE Dressing Deficit: Don/doff R sock, Don/doff L sock, Thread RLE into underwear, Thread LLE into underwear, Pull up over hips  Toileting Assistance with Device: Total  Toileting Deficit: Perineal hygiene, Incontinent  ADL Comments: ADL performance is anticipated based on pt's clinical presentation  Activity Tolerance:  Endurance: Tolerates less than 10 min exercise, no significant change in vital signs  Bed Mobility/Transfers: Bed Mobility  Bed Mobility:  Yes  Bed Mobility 1  Bed Mobility 1: Supine to sitting  Level of Assistance 1: Dependent (x2)  Bed Mobility 2  Bed Mobility  2: Rolling right, Rolling left  Level of Assistance 2: Maximum assistance  Bed Mobility Comments 2: for bed level toileting hygiene    Transfers  Transfer: Yes  Transfer 1  Technique 1: Sit to stand, Stand to sit  Transfer Level of Assistance 1: Arm in arm assistance, Moderate assistance      Functional Mobility:  Functional Mobility  Functional Mobility Performed: Yes  Functional Mobility 1  Surface 1: Level tile  Device 1: No device  Assistance 1: Arm in arm assistance, Moderate assistance  Comments 1: <3' bed to chair  Sitting Balance:  Static Sitting Balance  Static Sitting-Balance Support: Right upper extremity supported, Left upper extremity supported, Feet supported  Static Sitting-Level of Assistance: Close supervision    Extremities: RUE   RUE :  (Limited shoulder elevation and elobow flexion due to  reported pain; not tested further) and LUE   LUE: Within Functional Limits    Outcome Measures: [facility] Daily Activity  Putting on and taking off regular lower body clothing: Total  Bathing (including washing, rinsing, drying): A lot  Putting on and taking off regular upper body clothing: A lot  Toileting, which includes using toilet, bedpan or urinal: Total  Taking care of personal grooming such as brushing teeth: A lot  Eating Meals: A lot  Daily Activity - Total Score: 10      Education Documentation  Body Mechanics, taught by Dominique Duff OT at 3/10/2025  3:34 PM.  Learner: Patient  Readiness: Acceptance  Method: Explanation, Demonstration  Response: Needs Reinforcement  Comment: body mechanics during bed mobility and transfers    Goals:   Encounter Problems       Encounter Problems (Active)       OT Goals       Pt will demo bed mobility activities with Leanne  (Progressing)       Start:  03/10/25    Expected End:  03/24/25            Pt will demo functional transfers to/ from EOB,  chair and commode with most approp device and Leanne  (Progressing)       Start:  03/10/25    Expected End:  03/24/25            Pt will demo seated ADL routine and meaningful daily activities with ModA using modifications as needed  (Progressing)       Start:  03/10/25    Expected End:  03/24/25            Pt will demo improved activity tolerance evidenced by participation in ADL and/or functional mobility x15 mins with </=1 rest break.   (Progressing)       Start:  03/10/25    Expected End:  03/24/25            Patient will attend to therapeutic task for 15 minutes with minimum verbal cues demonstrating improved attention and participation in daily activities.  (Progressing)       Start:  03/10/25    Expected End:  03/24/25

## 2025-03-10 NOTE — PROGRESS NOTES
03/10/25 1300   Discharge Planning   Assistance Needed Spoke with patient's brother, who is POA, who is now requesting a new SNF (other than [facility]). Brother is aware patient will likely need to return to [facility] Memory Care Unit after his skilled time is up at a new SNF. Preference for SNF is now 1. [facility], 2. [place], 3. [facility] and 4. [facility]. Referrals sent via [facility]. Currently awaiting facilities to review and accept. Will not need a pre-cert under his Medicare insurance but will need three inpatient midnights.   Home or Post Acute Services Post acute facilities (Rehab/SNF/etc)   Type of Post Acute Facility Services Skilled nursing;Rehab   Expected Discharge Disposition SNF   Does the patient need discharge transport arranged? Yes   RoundTrip coordination needed? Yes   Has discharge transport been arranged? No

## 2025-03-10 NOTE — DOCUMENTATION CLARIFICATION NOTE
"    PATIENT:               JORGITO MEEK  ACCT #:                  [id number]  MRN:                       [id number]  :                       1940  ADMIT DATE:       3/8/2025 11:06 AM  DISCH DATE:  RESPONDING PROVIDER #:        [id number]          PROVIDER RESPONSE TEXT:    Possible pelvic fracture, no distal radial fracture    CDI QUERY TEXT:    Clarification        Instruction:    Based on your assessment of the patient and the clinical information, please provide the requested documentation by clicking on the appropriate radio button and enter any additional information if prompted.    Question: Is there a diagnosis indicative of the lab values or image study    When answering this query, please exercise your independent professional judgment. The fact that a question is being asked, does not imply that any particular answer is desired or expected.    The patient's clinical indicators include:  Clinical Information:  84 year old admitted from [facility] care for left hip fracture after fall.    Clinical Indicators:    Per H&P and Medicine on 3/9  \"CT imaging...possible pelvic fractures, possible distal right radius fracture\"    Treatment:  CT scan.  Orthopedic consult.  Left hemiarthroplasty done on 3/9.    Risk Factors:  Elderly, dementia, history of falls, long term use of ASA.  Options provided:  -- Possible pelvic fracture and possible distal radial fracture are  clinically significant and required treatment/monitoring  -- Possible radial fracture, no pelvic fracture.  -- Possible pelvic fracture, no distal radial fracture  -- Other - I will add my own diagnosis  -- Refer to Clinical Documentation Reviewer    Query created by: Bertha Shipman on 3/10/2025 2:11 PM      Electronically signed by:  WILL LAMBERT MD 3/10/2025 3:15 PM          "

## 2025-03-11 LAB
ALBUMIN SERPL BCP-MCNC: 2.7 G/DL (ref 3.4–5)
ANION GAP SERPL CALC-SCNC: 11 MMOL/L (ref 10–20)
BUN SERPL-MCNC: 26 MG/DL (ref 6–23)
CALCIUM SERPL-MCNC: 8.3 MG/DL (ref 8.6–10.3)
CHLORIDE SERPL-SCNC: 101 MMOL/L (ref 98–107)
CO2 SERPL-SCNC: 28 MMOL/L (ref 21–32)
CREAT SERPL-MCNC: 1.48 MG/DL (ref 0.5–1.3)
EGFRCR SERPLBLD CKD-EPI 2021: 46 ML/MIN/1.73M*2
ERYTHROCYTE [DISTWIDTH] IN BLOOD BY AUTOMATED COUNT: 14.1 % (ref 11.5–14.5)
GLUCOSE SERPL-MCNC: 107 MG/DL (ref 74–99)
HCT VFR BLD AUTO: 36.1 % (ref 41–52)
HGB BLD-MCNC: 11.7 G/DL (ref 13.5–17.5)
LEGIONELLA AG UR QL: NEGATIVE
MAGNESIUM SERPL-MCNC: 2.16 MG/DL (ref 1.6–2.4)
MCH RBC QN AUTO: 28.8 PG (ref 26–34)
MCHC RBC AUTO-ENTMCNC: 32.4 G/DL (ref 32–36)
MCV RBC AUTO: 89 FL (ref 80–100)
NRBC BLD-RTO: 0 /100 WBCS (ref 0–0)
PHOSPHATE SERPL-MCNC: 2.7 MG/DL (ref 2.5–4.9)
PLATELET # BLD AUTO: 356 X10*3/UL (ref 150–450)
POTASSIUM SERPL-SCNC: 3.4 MMOL/L (ref 3.5–5.3)
RBC # BLD AUTO: 4.06 X10*6/UL (ref 4.5–5.9)
S PNEUM AG UR QL: NEGATIVE
SODIUM SERPL-SCNC: 137 MMOL/L (ref 136–145)
WBC # BLD AUTO: 10.6 X10*3/UL (ref 4.4–11.3)

## 2025-03-11 PROCEDURE — 2500000001 HC RX 250 WO HCPCS SELF ADMINISTERED DRUGS (ALT 637 FOR MEDICARE OP): Performed by: ORTHOPAEDIC SURGERY

## 2025-03-11 PROCEDURE — 2500000002 HC RX 250 W HCPCS SELF ADMINISTERED DRUGS (ALT 637 FOR MEDICARE OP, ALT 636 FOR OP/ED): Performed by: ORTHOPAEDIC SURGERY

## 2025-03-11 PROCEDURE — 2500000001 HC RX 250 WO HCPCS SELF ADMINISTERED DRUGS (ALT 637 FOR MEDICARE OP): Performed by: NURSE PRACTITIONER

## 2025-03-11 PROCEDURE — 1100000001 HC PRIVATE ROOM DAILY

## 2025-03-11 PROCEDURE — 80069 RENAL FUNCTION PANEL: CPT | Performed by: ORTHOPAEDIC SURGERY

## 2025-03-11 PROCEDURE — 97530 THERAPEUTIC ACTIVITIES: CPT | Mod: GO,CO,59

## 2025-03-11 PROCEDURE — 85027 COMPLETE CBC AUTOMATED: CPT | Performed by: ORTHOPAEDIC SURGERY

## 2025-03-11 PROCEDURE — 99232 SBSQ HOSP IP/OBS MODERATE 35: CPT | Performed by: FAMILY MEDICINE

## 2025-03-11 PROCEDURE — 83735 ASSAY OF MAGNESIUM: CPT | Performed by: ORTHOPAEDIC SURGERY

## 2025-03-11 PROCEDURE — 36415 COLL VENOUS BLD VENIPUNCTURE: CPT | Performed by: ORTHOPAEDIC SURGERY

## 2025-03-11 PROCEDURE — 2500000004 HC RX 250 GENERAL PHARMACY W/ HCPCS (ALT 636 FOR OP/ED): Performed by: STUDENT IN AN ORGANIZED HEALTH CARE EDUCATION/TRAINING PROGRAM

## 2025-03-11 PROCEDURE — 2500000004 HC RX 250 GENERAL PHARMACY W/ HCPCS (ALT 636 FOR OP/ED): Performed by: ORTHOPAEDIC SURGERY

## 2025-03-11 PROCEDURE — 97535 SELF CARE MNGMENT TRAINING: CPT | Mod: GO,CO

## 2025-03-11 RX ADMIN — TRAZODONE HYDROCHLORIDE 50 MG: 50 TABLET ORAL at 21:50

## 2025-03-11 RX ADMIN — ATORVASTATIN CALCIUM 40 MG: 40 TABLET, FILM COATED ORAL at 21:50

## 2025-03-11 RX ADMIN — Medication 1 APPLICATION: at 21:57

## 2025-03-11 RX ADMIN — ACETAMINOPHEN 650 MG: 325 TABLET ORAL at 13:22

## 2025-03-11 RX ADMIN — PANTOPRAZOLE SODIUM 40 MG: 40 TABLET, DELAYED RELEASE ORAL at 06:10

## 2025-03-11 RX ADMIN — SENNOSIDES AND DOCUSATE SODIUM 2 TABLET: 50; 8.6 TABLET ORAL at 21:50

## 2025-03-11 RX ADMIN — Medication 1 APPLICATION: at 08:28

## 2025-03-11 RX ADMIN — ACETAMINOPHEN 650 MG: 325 TABLET ORAL at 17:37

## 2025-03-11 RX ADMIN — ACETAMINOPHEN 650 MG: 325 TABLET ORAL at 06:10

## 2025-03-11 RX ADMIN — HEPARIN SODIUM 5000 UNITS: 5000 INJECTION, SOLUTION INTRAVENOUS; SUBCUTANEOUS at 00:46

## 2025-03-11 RX ADMIN — TAMSULOSIN HYDROCHLORIDE 0.4 MG: 0.4 CAPSULE ORAL at 08:21

## 2025-03-11 RX ADMIN — CEFTRIAXONE SODIUM 2 G: 2 INJECTION, SOLUTION INTRAVENOUS at 16:16

## 2025-03-11 RX ADMIN — FINASTERIDE 5 MG: 5 TABLET, FILM COATED ORAL at 08:21

## 2025-03-11 RX ADMIN — FLUOXETINE HYDROCHLORIDE 10 MG: 10 CAPSULE ORAL at 08:21

## 2025-03-11 RX ADMIN — HEPARIN SODIUM 5000 UNITS: 5000 INJECTION, SOLUTION INTRAVENOUS; SUBCUTANEOUS at 16:16

## 2025-03-11 RX ADMIN — OXYCODONE HYDROCHLORIDE 5 MG: 5 TABLET ORAL at 08:22

## 2025-03-11 RX ADMIN — ACETAMINOPHEN 650 MG: 325 TABLET ORAL at 21:50

## 2025-03-11 RX ADMIN — HEPARIN SODIUM 5000 UNITS: 5000 INJECTION, SOLUTION INTRAVENOUS; SUBCUTANEOUS at 08:26

## 2025-03-11 ASSESSMENT — PAIN SCALES - PAIN ASSESSMENT IN ADVANCED DEMENTIA (PAINAD)
CONSOLABILITY: NO NEED TO CONSOLE
BREATHING: NORMAL
BODYLANGUAGE: RELAXED
NEGVOCALIZATION: REPEATED TROUBLED CALLING OUT, LOUD MOANING/GROANING, CRYING
BREATHING: OCCASIONAL LABORED BREATHING, SHORT PERIOD OF HYPERVENTILATION
TOTALSCORE: 0
CONSOLABILITY: NO NEED TO CONSOLE
FACIALEXPRESSION: SAD, FRIGHTENED, FROWN
TOTALSCORE: 0
CONSOLABILITY: NO NEED TO CONSOLE
TOTALSCORE: 8
FACIALEXPRESSION: FACIAL GRIMACING
TOTALSCORE: 0
BREATHING: NORMAL
FACIALEXPRESSION: SMILING OR INEXPRESSIVE
FACIALEXPRESSION: SMILING OR INEXPRESSIVE
BODYLANGUAGE: TENSE, DISTRESSED PACING, FIDGETING
CONSOLABILITY: UNABLE TO CONSOLE, DISTRACT OR REASSURE
CONSOLABILITY: DISTRACTED OR REASSURED BY VOICE/TOUCH
BREATHING: OCCASIONAL LABORED BREATHING, SHORT PERIOD OF HYPERVENTILATION
NEGVOCALIZATION: REPEATED TROUBLED CALLING OUT, LOUD MOANING/GROANING, CRYING
BREATHING: OCCASIONAL LABORED BREATHING, SHORT PERIOD OF HYPERVENTILATION
FACIALEXPRESSION: SMILING OR INEXPRESSIVE
FACIALEXPRESSION: SMILING OR INEXPRESSIVE
BODYLANGUAGE: RELAXED
TOTALSCORE: 1
BREATHING: NORMAL
CONSOLABILITY: NO NEED TO CONSOLE
BODYLANGUAGE: RELAXED
TOTALSCORE: 6
BODYLANGUAGE: TENSE, DISTRESSED PACING, FIDGETING
TOTALSCORE: MEDICATION (SEE MAR)
BODYLANGUAGE: RELAXED

## 2025-03-11 ASSESSMENT — COGNITIVE AND FUNCTIONAL STATUS - GENERAL
CLIMB 3 TO 5 STEPS WITH RAILING: TOTAL
DRESSING REGULAR LOWER BODY CLOTHING: TOTAL
DRESSING REGULAR UPPER BODY CLOTHING: A LOT
CLIMB 3 TO 5 STEPS WITH RAILING: TOTAL
PERSONAL GROOMING: TOTAL
STANDING UP FROM CHAIR USING ARMS: TOTAL
DRESSING REGULAR UPPER BODY CLOTHING: A LOT
MOBILITY SCORE: 8
DAILY ACTIVITIY SCORE: 10
MOVING TO AND FROM BED TO CHAIR: TOTAL
EATING MEALS: A LOT
HELP NEEDED FOR BATHING: A LOT
DRESSING REGULAR LOWER BODY CLOTHING: TOTAL
TOILETING: TOTAL
TOILETING: TOTAL
WALKING IN HOSPITAL ROOM: TOTAL
EATING MEALS: A LOT
HELP NEEDED FOR BATHING: TOTAL
MOBILITY SCORE: 8
TOILETING: TOTAL
MOVING TO AND FROM BED TO CHAIR: TOTAL
DRESSING REGULAR UPPER BODY CLOTHING: A LOT
PERSONAL GROOMING: TOTAL
MOVING FROM LYING ON BACK TO SITTING ON SIDE OF FLAT BED WITH BEDRAILS: A LOT
STANDING UP FROM CHAIR USING ARMS: TOTAL
HELP NEEDED FOR BATHING: A LOT
PERSONAL GROOMING: A LOT
DAILY ACTIVITIY SCORE: 8
DAILY ACTIVITIY SCORE: 9
MOVING FROM LYING ON BACK TO SITTING ON SIDE OF FLAT BED WITH BEDRAILS: A LOT
TURNING FROM BACK TO SIDE WHILE IN FLAT BAD: A LOT
DRESSING REGULAR LOWER BODY CLOTHING: TOTAL
WALKING IN HOSPITAL ROOM: TOTAL
EATING MEALS: A LOT
TURNING FROM BACK TO SIDE WHILE IN FLAT BAD: A LOT

## 2025-03-11 ASSESSMENT — PAIN DESCRIPTION - DESCRIPTORS: DESCRIPTORS: PATIENT UNABLE TO DESCRIBE

## 2025-03-11 ASSESSMENT — PAIN SCALES - WONG BAKER: WONGBAKER_NUMERICALRESPONSE: HURTS EVEN MORE

## 2025-03-11 ASSESSMENT — PAIN - FUNCTIONAL ASSESSMENT: PAIN_FUNCTIONAL_ASSESSMENT: WONG-BAKER FACES

## 2025-03-11 ASSESSMENT — ACTIVITIES OF DAILY LIVING (ADL): HOME_MANAGEMENT_TIME_ENTRY: 15

## 2025-03-11 NOTE — CARE PLAN
The patient's goals for the shift include      The clinical goals for the shift include Patient will tolerate turns, pain control, and ambulation      Problem: Pain  Goal: Takes deep breaths with improved pain control throughout the shift  Outcome: Progressing     Problem: Safety - Adult  Goal: Free from fall injury  Outcome: Progressing     Problem: Discharge Planning  Goal: Discharge to home or other facility with appropriate resources  Outcome: Progressing

## 2025-03-11 NOTE — PROGRESS NOTES
Occupational Therapy    Occupational Therapy Treatment    Name: Jabari Dumas  MRN: [id number]  Department: [address]  Room: [address] [place] [address]  Date: 03/11/25  Time Calculation  Start Time: 0856  Stop Time: 0930  Time Calculation (min): 34 min    Assessment:  OT Assessment: Pt limited by, weakness, pain and cognition for tasks in treatment this am. Pt continues to present with decreased ADL performance and impaired mobility. Continue to beleive pt will benefit from skilled OT services to increase functional outcomes  Prognosis: Fair  Barriers to Discharge Home: Caregiver assistance, Physical needs, Cognition needs  Caregiver Assistance: Caregiver assistance needed per identified barriers - however, level of patient's required assistance exceeds assistance available at home  Cognition Needs: 24hr supervision for safety awareness needed, Cognition-related high falls risk  Physical Needs: 24hr mobility assistance needed, 24hr ADL assistance needed  Evaluation/Treatment Tolerance: Patient tolerated treatment well  Medical Staff Made Aware: Yes  End of Session Communication: Bedside nurse, PCT/NA/CTA (Unable to safely leave patient in bedside chair d/t restlessness. Returned to bed at end of tx. Discussed confusion and pain indicators despite premedication for pain and patient tendency to external rotation w knee flexion on surgical side in bed.)  End of Session Patient Position: Bed, 3 rail up, Alarm on  Plan:  Treatment Interventions: ADL retraining, Functional transfer training, UE strengthening/ROM, Patient/family training, Equipment evaluation/education, Compensatory technique education  OT Frequency: 3 times per week  OT Discharge Recommendations: Moderate intensity level of continued care  Equipment Recommended upon Discharge:  (TBD in discharge setting)  OT Recommended Transfer Status: Assist of 2  OT - OK to Discharge: Yes (in accord with OT POC)    Subjective   Previous Visit Info:  OT Last Visit  OT Received On:  03/11/25  General:  General  Reason for Referral: L hip fracture s/p L hemiarthroplasty; referred to OT for impaired ADL   Referred By: Dr Buckley  Past Medical History Relevant to Rehab: dementia, sick sinus syndrome (s/p PPM), HTN, CAD (s/p CABG 2017), chronic diastolic heart failure, COPD, CKD 3A, BPH with h/o urinary retention, PUD complicated by remote GI bleed  Family/Caregiver Present: No  Prior to Session Communication: Bedside nurse, PCT/NA/CTA (Pt oriented to self only, appropriate for treatment as tolerated, premedicated for pain prior to treatment.)  Patient Position Received: Bed, 3 rail up, Alarm on  Preferred Learning Style: verbal (responds to verbal cues and stimuli)  General Comment: Pt pleasantly confused, verbalizations word salad and partial sentances. Difficulty with direction following, highly distractable and demonstrates difficulty with simple motor planning cues and prompts.  Precautions:  LE Weight Bearing Status: Weight Bearing as Tolerated (B LE's)  Medical Precautions: Fall precautions (Purewick external catheter, IV, Masimo constant pulse ox.)  Post-Surgical Precautions: Left hip precautions (Anterior)  Precautions Comment: ? R Wrist distal radius fx, none seen in 3/6 xray, though imaging was limited    Pain Assessment:  Pain Assessment  Pain Assessment: Osorio-Baker FACES  Osorio-Baker FACES Pain Rating: Hurts even more  Pain Type: Surgical pain  Pain Location: Hip  Pain Orientation: Left  Pain Descriptors: Patient unable to describe  Pain Interventions: Cold applied, Repositioned, Ambulation/increased activity, Elevated  Response to Interventions: Sleeping    Objective   Cognition:  Overall Cognitive Status: Impaired at baseline (Difficulty with direction following, task and activity focus and interactive communication.)  Orientation Level: Disoriented to place, Disoriented to time, Disoriented to situation  Activities of Daily Living: Grooming  Grooming Level of Assistance: Setup,  Moderate assistance, Maximum verbal cues, Tactile cues, Cognitive support (comment) (gestures and hand over hand for task initiation and ontask behaviors needed)  Grooming Where Assessed: Edge of bed  Grooming Comments: face and hand washing with prepped warm washcloth. Min Assist for seated balance required at bedside.    UE Dressing  UE Dressing Level of Assistance: Maximum assistance, Maximum verbal cues, Tactile cues, Cognitive support (comment)  UE Dressing Where Assessed: Edge of bed  UE Dressing Comments: Gown changed seated at EOB, with significantly increased time on task, Min Assist for seated balance and cue dependent for task sequencing and problem solving.     Bed Mobility/Transfers: Bed Mobility  Bed Mobility: Yes  Bed Mobility 1  Bed Mobility 1: Supine to sitting, Sitting to supine  Level of Assistance 1: Dependent, +2  Bed Mobility Comments 1: Assist needed with trunk and LE's for sit/swivel on chux at EOB. Pt did not provide >10% assist  Bed Mobility 2  Bed Mobility  2: Rolling right, Rolling left  Level of Assistance 2: Maximum assistance, Maximum verbal cues, +2 (hand over hand for hand positioning on bed railand assist for rolling with chux provided)  Bed Mobility Comments 2: Rolling side<>side for repositioning in return to supine and arrangement of chux.    Transfers  Transfer: Yes  Transfer 1  Transfer From 1: Bed to, Chair with arms to  Transfer to 1: Bed  Technique 1: Sit to stand, Stand to sit  Transfer Level of Assistance 1: Maximum assistance, +2, Arm in arm assistance  Trials/Comments 1: 2 Assist with CNA and single step verbal and tactile cues to/from bed and chair with arms.    Outcome Measures:  [facility] Daily Activity  Putting on and taking off regular lower body clothing: Total  Bathing (including washing, rinsing, drying): A lot  Putting on and taking off regular upper body clothing: A lot  Toileting, which includes using toilet, bedpan or urinal: Total  Taking care of personal  grooming such as brushing teeth: A lot  Eating Meals: A lot  Daily Activity - Total Score: 10    Education Documentation  Precautions, taught by THEO Fierro at 3/11/2025 10:31 AM.  Learner: Patient  Readiness: Acceptance  Method: Explanation, Demonstration  Response: Needs Reinforcement, No Evidence of Learning  Comment: Pt educated on ant. THR precautions, safety and effective body mechanics, techniques for self-care, and non-medication pain management. Pt partially attentive to instruction in session. Continued reinforcement is indicated.    ADL Training, taught by THEO Fierro at 3/11/2025 10:31 AM.  Learner: Patient  Readiness: Acceptance  Method: Explanation, Demonstration  Response: Needs Reinforcement, No Evidence of Learning  Comment: Pt educated on ant. THR precautions, safety and effective body mechanics, techniques for self-care, and non-medication pain management. Pt partially attentive to instruction in session. Continued reinforcement is indicated.    Body Mechanics, taught by THEO Fierro at 3/11/2025 10:31 AM.  Learner: Patient  Readiness: Acceptance  Method: Explanation, Demonstration  Response: Needs Reinforcement, No Evidence of Learning  Comment: Pt educated on ant. THR precautions, safety and effective body mechanics, techniques for self-care, and non-medication pain management. Pt partially attentive to instruction in session. Continued reinforcement is indicated.    Goals:  Encounter Problems       Encounter Problems (Active)       OT Goals       Pt will demo bed mobility activities with Leanne  (Progressing)       Start:  03/10/25    Expected End:  03/24/25            Pt will demo functional transfers to/ from EOB, chair and commode with most approp device and Leanne  (Progressing)       Start:  03/10/25    Expected End:  03/24/25            Pt will demo seated ADL routine and meaningful daily activities with ModA using modifications as needed  (Progressing)       Start:   03/10/25    Expected End:  03/24/25            Pt will demo improved activity tolerance evidenced by participation in ADL and/or functional mobility x15 mins with </=1 rest break.   (Progressing)       Start:  03/10/25    Expected End:  03/24/25            Patient will attend to therapeutic task for 15 minutes with minimum verbal cues demonstrating improved attention and participation in daily activities.  (Progressing)       Start:  03/10/25    Expected End:  03/24/25

## 2025-03-11 NOTE — PROGRESS NOTES
03/11/25 1350   Discharge Planning   Assistance Needed Spoke with patient brother and niece who confirm FOC is [facility]. Facility notified of their preference. Patient will DC tomorrow to new SNF.

## 2025-03-11 NOTE — CARE PLAN
Problem: Pain  Goal: Takes deep breaths with improved pain control throughout the shift  3/11/2025 0410 by Shagufta Miguel RN  Outcome: Progressing  3/10/2025 2352 by Shagufta Miguel RN  Outcome: Progressing  Goal: Turns in bed with improved pain control throughout the shift  3/11/2025 0410 by Shagufta Miguel RN  Outcome: Progressing  3/10/2025 2352 by Shagufta Miguel RN  Outcome: Progressing  Goal: Walks with improved pain control throughout the shift  3/11/2025 0410 by Shagufta Miguel RN  Outcome: Progressing  3/10/2025 2352 by Shagufta Miguel RN  Outcome: Progressing  Goal: Performs ADL's with improved pain control throughout shift  3/11/2025 0410 by Shagufta Miguel RN  Outcome: Progressing  3/10/2025 2352 by Shagufta Miguel RN  Outcome: Progressing  Goal: Participates in PT with improved pain control throughout the shift  3/11/2025 0410 by Shagufta Miguel RN  Outcome: Progressing  3/10/2025 2352 by Shagufta Miguel RN  Outcome: Progressing  Goal: Free from opioid side effects throughout the shift  3/11/2025 0410 by Shagufta Miguel RN  Outcome: Progressing  3/10/2025 2352 by Shagufta Miguel RN  Outcome: Progressing  Goal: Free from acute confusion related to pain meds throughout the shift  3/11/2025 0410 by Shagufta Miguel RN  Outcome: Progressing  3/10/2025 2352 by Shagufta Miguel RN  Outcome: Progressing     Problem: Safety - Adult  Goal: Free from fall injury  3/11/2025 0410 by Shagufta Miguel RN  Outcome: Progressing  3/10/2025 2352 by Shagufta Miguel RN  Outcome: Progressing     Problem: Discharge Planning  Goal: Discharge to home or other facility with appropriate resources  3/11/2025 0410 by Shagufta Miguel RN  Outcome: Progressing  3/10/2025 2352 by Shagufta Miguel RN  Outcome: Progressing     Problem: Skin  Goal: Participates in plan/prevention/treatment measures  3/11/2025 0410 by Shagufta Miguel RN  Outcome: Progressing  3/10/2025 2352 by Shagufta Miguel RN  Outcome:  Progressing  Flowsheets (Taken 3/10/2025 2352)  Participates in plan/prevention/treatment measures: Elevate heels  Goal: Prevent/manage excess moisture  3/11/2025 0410 by Shagufta Miguel RN  Outcome: Progressing  3/10/2025 2352 by Shagufta Miguel RN  Outcome: Progressing  Flowsheets (Taken 3/10/2025 2352)  Prevent/manage excess moisture: Cleanse incontinence/protect with barrier cream  Goal: Prevent/minimize sheer/friction injuries  3/11/2025 0410 by Shagufta Miguel RN  Outcome: Progressing  3/10/2025 2352 by Shagufta Miguel RN  Outcome: Progressing  Flowsheets (Taken 3/10/2025 2352)  Prevent/minimize sheer/friction injuries:   Use pull sheet   Turn/reposition every 2 hours/use positioning/transfer devices   HOB 30 degrees or less  Goal: Promote/optimize nutrition  3/11/2025 0410 by Shagufta Miguel RN  Outcome: Progressing  3/10/2025 2352 by Shagufta Miguel RN  Outcome: Progressing  Flowsheets (Taken 3/10/2025 2352)  Promote/optimize nutrition:   Offer water/supplements/favorite foods   Monitor/record intake including meals   Assist with feeding  Goal: Promote skin healing  3/11/2025 0410 by Shagufta Miguel RN  Outcome: Progressing  3/10/2025 2352 by Shagufta Miguel RN  Outcome: Progressing  Flowsheets (Taken 3/10/2025 2352)  Promote skin healing:   Protective dressings over bony prominences   Turn/reposition every 2 hours/use positioning/transfer devices   Assess skin/pad under line(s)/device(s)   The patient's goals for the shift include  sleep    The clinical goals for the shift include Patient will tolerate Q2 turns this shift    Over the shift, the patient did not make progress toward the following goals. Barriers to progression include pain. Recommendations to address these barriers include repositioning, rest, cold application, and pain meds. Patient tolerated turning this shift. Patient had some complaints of pain, but was medicated with effect. Patient was able to void this shift. Patient was able  to sleep at least 6 hours, on and off.

## 2025-03-11 NOTE — PROGRESS NOTES
03/11/25 1500   Discharge Planning   Assistance Needed Spoke with patient's brother who then three-way called patient's niece. They are undecided on which SNF they would like. We let them know that [facility] has beds available and can accept patient tomorrow. Family is to return call to this TCC by end of day to notify of their FOC.

## 2025-03-12 VITALS
TEMPERATURE: 97.9 F | SYSTOLIC BLOOD PRESSURE: 123 MMHG | OXYGEN SATURATION: 97 % | HEART RATE: 75 BPM | HEIGHT: 72 IN | WEIGHT: 192.68 LBS | RESPIRATION RATE: 16 BRPM | DIASTOLIC BLOOD PRESSURE: 65 MMHG | BODY MASS INDEX: 26.1 KG/M2

## 2025-03-12 LAB
ALBUMIN SERPL BCP-MCNC: 2.4 G/DL (ref 3.4–5)
ANION GAP SERPL CALC-SCNC: 11 MMOL/L (ref 10–20)
BACTERIA SPEC RESP CULT: ABNORMAL
BACTERIA UR CULT: ABNORMAL
BUN SERPL-MCNC: 22 MG/DL (ref 6–23)
CALCIUM SERPL-MCNC: 8.4 MG/DL (ref 8.6–10.3)
CHLORIDE SERPL-SCNC: 104 MMOL/L (ref 98–107)
CO2 SERPL-SCNC: 27 MMOL/L (ref 21–32)
CREAT SERPL-MCNC: 1.26 MG/DL (ref 0.5–1.3)
EGFRCR SERPLBLD CKD-EPI 2021: 56 ML/MIN/1.73M*2
ERYTHROCYTE [DISTWIDTH] IN BLOOD BY AUTOMATED COUNT: 14.4 % (ref 11.5–14.5)
GLUCOSE SERPL-MCNC: 102 MG/DL (ref 74–99)
GRAM STN SPEC: ABNORMAL
GRAM STN SPEC: ABNORMAL
HCT VFR BLD AUTO: 33.4 % (ref 41–52)
HGB BLD-MCNC: 10.7 G/DL (ref 13.5–17.5)
MAGNESIUM SERPL-MCNC: 2.23 MG/DL (ref 1.6–2.4)
MCH RBC QN AUTO: 28.9 PG (ref 26–34)
MCHC RBC AUTO-ENTMCNC: 32 G/DL (ref 32–36)
MCV RBC AUTO: 90 FL (ref 80–100)
NRBC BLD-RTO: 0 /100 WBCS (ref 0–0)
PHOSPHATE SERPL-MCNC: 3.1 MG/DL (ref 2.5–4.9)
PLATELET # BLD AUTO: 346 X10*3/UL (ref 150–450)
POTASSIUM SERPL-SCNC: 3.6 MMOL/L (ref 3.5–5.3)
RBC # BLD AUTO: 3.7 X10*6/UL (ref 4.5–5.9)
SODIUM SERPL-SCNC: 138 MMOL/L (ref 136–145)
WBC # BLD AUTO: 9.7 X10*3/UL (ref 4.4–11.3)

## 2025-03-12 PROCEDURE — 99239 HOSP IP/OBS DSCHRG MGMT >30: CPT | Performed by: FAMILY MEDICINE

## 2025-03-12 PROCEDURE — 83735 ASSAY OF MAGNESIUM: CPT | Performed by: ORTHOPAEDIC SURGERY

## 2025-03-12 PROCEDURE — 2500000004 HC RX 250 GENERAL PHARMACY W/ HCPCS (ALT 636 FOR OP/ED): Performed by: STUDENT IN AN ORGANIZED HEALTH CARE EDUCATION/TRAINING PROGRAM

## 2025-03-12 PROCEDURE — 2500000001 HC RX 250 WO HCPCS SELF ADMINISTERED DRUGS (ALT 637 FOR MEDICARE OP): Performed by: NURSE PRACTITIONER

## 2025-03-12 PROCEDURE — 85027 COMPLETE CBC AUTOMATED: CPT | Performed by: ORTHOPAEDIC SURGERY

## 2025-03-12 PROCEDURE — 80069 RENAL FUNCTION PANEL: CPT | Performed by: ORTHOPAEDIC SURGERY

## 2025-03-12 PROCEDURE — 2500000001 HC RX 250 WO HCPCS SELF ADMINISTERED DRUGS (ALT 637 FOR MEDICARE OP): Performed by: ORTHOPAEDIC SURGERY

## 2025-03-12 PROCEDURE — 2500000002 HC RX 250 W HCPCS SELF ADMINISTERED DRUGS (ALT 637 FOR MEDICARE OP, ALT 636 FOR OP/ED): Performed by: ORTHOPAEDIC SURGERY

## 2025-03-12 PROCEDURE — 36415 COLL VENOUS BLD VENIPUNCTURE: CPT | Performed by: ORTHOPAEDIC SURGERY

## 2025-03-12 PROCEDURE — 2500000004 HC RX 250 GENERAL PHARMACY W/ HCPCS (ALT 636 FOR OP/ED): Performed by: ORTHOPAEDIC SURGERY

## 2025-03-12 PROCEDURE — 2500000001 HC RX 250 WO HCPCS SELF ADMINISTERED DRUGS (ALT 637 FOR MEDICARE OP): Performed by: FAMILY MEDICINE

## 2025-03-12 RX ORDER — OXYCODONE HYDROCHLORIDE 5 MG/1
5 TABLET ORAL EVERY 6 HOURS PRN
Qty: 12 TABLET | Refills: 0 | Status: SHIPPED | OUTPATIENT
Start: 2025-03-12 | End: 2025-03-15

## 2025-03-12 RX ORDER — HEPARIN SODIUM 5000 [USP'U]/ML
5000 INJECTION, SOLUTION INTRAVENOUS; SUBCUTANEOUS EVERY 8 HOURS
Start: 2025-03-12

## 2025-03-12 RX ORDER — AMOXICILLIN AND CLAVULANATE POTASSIUM 875; 125 MG/1; MG/1
1 TABLET, FILM COATED ORAL ONCE
Status: COMPLETED | OUTPATIENT
Start: 2025-03-12 | End: 2025-03-12

## 2025-03-12 RX ORDER — AMOXICILLIN 250 MG
2 CAPSULE ORAL NIGHTLY
Start: 2025-03-12

## 2025-03-12 RX ORDER — EAR PLUGS
1 EACH OTIC (EAR) 2 TIMES DAILY
Start: 2025-03-12

## 2025-03-12 RX ORDER — AMOXICILLIN AND CLAVULANATE POTASSIUM 875; 125 MG/1; MG/1
1 TABLET, FILM COATED ORAL 2 TIMES DAILY
Start: 2025-03-12 | End: 2025-03-17

## 2025-03-12 RX ORDER — FUROSEMIDE 40 MG/1
20 TABLET ORAL DAILY
Start: 2025-03-12 | End: 2025-04-11

## 2025-03-12 RX ORDER — PANTOPRAZOLE SODIUM 40 MG/1
40 TABLET, DELAYED RELEASE ORAL
Start: 2025-03-13 | End: 2025-04-12

## 2025-03-12 RX ADMIN — HEPARIN SODIUM 5000 UNITS: 5000 INJECTION, SOLUTION INTRAVENOUS; SUBCUTANEOUS at 16:16

## 2025-03-12 RX ADMIN — HEPARIN SODIUM 5000 UNITS: 5000 INJECTION, SOLUTION INTRAVENOUS; SUBCUTANEOUS at 02:27

## 2025-03-12 RX ADMIN — Medication 1 APPLICATION: at 09:05

## 2025-03-12 RX ADMIN — PANTOPRAZOLE SODIUM 40 MG: 40 TABLET, DELAYED RELEASE ORAL at 06:15

## 2025-03-12 RX ADMIN — FLUOXETINE HYDROCHLORIDE 10 MG: 10 CAPSULE ORAL at 08:41

## 2025-03-12 RX ADMIN — OXYCODONE HYDROCHLORIDE 5 MG: 5 TABLET ORAL at 08:58

## 2025-03-12 RX ADMIN — ACETAMINOPHEN 650 MG: 325 TABLET ORAL at 11:17

## 2025-03-12 RX ADMIN — TAMSULOSIN HYDROCHLORIDE 0.4 MG: 0.4 CAPSULE ORAL at 08:40

## 2025-03-12 RX ADMIN — OXYCODONE HYDROCHLORIDE 5 MG: 5 TABLET ORAL at 14:59

## 2025-03-12 RX ADMIN — ACETAMINOPHEN 650 MG: 650 SOLUTION ORAL at 14:58

## 2025-03-12 RX ADMIN — AMOXICILLIN AND CLAVULANATE POTASSIUM 1 TABLET: 875; 125 TABLET, FILM COATED ORAL at 17:31

## 2025-03-12 RX ADMIN — HEPARIN SODIUM 5000 UNITS: 5000 INJECTION, SOLUTION INTRAVENOUS; SUBCUTANEOUS at 08:41

## 2025-03-12 RX ADMIN — ACETAMINOPHEN 650 MG: 325 TABLET ORAL at 02:37

## 2025-03-12 RX ADMIN — ACETAMINOPHEN 650 MG: 325 TABLET ORAL at 06:15

## 2025-03-12 RX ADMIN — FINASTERIDE 5 MG: 5 TABLET, FILM COATED ORAL at 08:40

## 2025-03-12 ASSESSMENT — COGNITIVE AND FUNCTIONAL STATUS - GENERAL
DRESSING REGULAR UPPER BODY CLOTHING: TOTAL
EATING MEALS: TOTAL
PERSONAL GROOMING: TOTAL
CLIMB 3 TO 5 STEPS WITH RAILING: TOTAL
DAILY ACTIVITIY SCORE: 6
MOVING FROM LYING ON BACK TO SITTING ON SIDE OF FLAT BED WITH BEDRAILS: TOTAL
MOVING TO AND FROM BED TO CHAIR: TOTAL
STANDING UP FROM CHAIR USING ARMS: TOTAL
HELP NEEDED FOR BATHING: TOTAL
WALKING IN HOSPITAL ROOM: TOTAL
MOBILITY SCORE: 6
DRESSING REGULAR LOWER BODY CLOTHING: TOTAL
TOILETING: TOTAL
TURNING FROM BACK TO SIDE WHILE IN FLAT BAD: TOTAL

## 2025-03-12 ASSESSMENT — PAIN DESCRIPTION - LOCATION
LOCATION: HIP

## 2025-03-12 ASSESSMENT — PAIN SCALES - WONG BAKER
WONGBAKER_NUMERICALRESPONSE: HURTS LITTLE BIT
WONGBAKER_NUMERICALRESPONSE: HURTS WHOLE LOT
WONGBAKER_NUMERICALRESPONSE: HURTS WHOLE LOT

## 2025-03-12 ASSESSMENT — PAIN DESCRIPTION - ORIENTATION
ORIENTATION: ANTERIOR;LEFT

## 2025-03-12 NOTE — ASSESSMENT & PLAN NOTE
Jabari Dumas is an 84 y.o. male with dementia presented to the [facility] ED from his memory care unit due to sudden onset left leg pain.     Acute displaced left femoral neck fracture:  -orthopedics consulted; performed left hip hemiarthroplasty on 3/9  -pain mgmt and bowel regimen   -PT/OT   -Perioperative cefazolin per orthopedics     Bibasilar pneumonia: Suspicious for aspiration pneumonia  COPD:  -Azithromycin complete, continue ceftriaxone  -Respiratory culture, Legionella and strep antigens, procalcitonin pending  -Bronchial hygiene, incentive spirometry, DuoNebs  -SLP consulted  -Oxygen, wean as tolerated    Acute cystitis  UA positive, culture growing enteric bacilli  On ceftriaxone, follow culture       Chronic kidney disease  ARAM resolved, baseline creatinine is around 1.3-1.5  -Hold home Lasix  -Cautious IV hydration; monitor volume status  -Trend renal function and electrolytes    Chronic diastolic heart failure  Lasix currently on hold, resume when able  Appears euvolemic  Monitor fluid status close  Daily weights  Strict I's and O's     CAD:  Hyperlipidemia:  Non-MI troponin elevation: Due to above  -Continue statin perioperatively     Dementia:  -Continue fluoxetine, trazodone     BPH with history of urinary retention:  Multiple asymptomatic renal calculi:  -Continue finasteride, tamsulosin  -Urinalysis positive, culture growing enteric bacilli  -Bladder scans to assess for urinary retention     GERD:  History of PUD:  Esophageal reflux and hiatal hernia on CT:  -PPI     Anemia:  -Trend CBC     DVT PPx: Subcu heparin     Dispo: Inpatient, likely 3 to 4 days anticipating need for skilled placement postoperatively

## 2025-03-12 NOTE — CARE PLAN
The patient's goals for the shift include      Problem: Pain  Goal: Takes deep breaths with improved pain control throughout the shift  Outcome: Progressing  Goal: Turns in bed with improved pain control throughout the shift  Outcome: Progressing  Goal: Walks with improved pain control throughout the shift  Outcome: Progressing  Goal: Performs ADL's with improved pain control throughout shift  Outcome: Progressing  Goal: Participates in PT with improved pain control throughout the shift  Outcome: Progressing  Goal: Free from opioid side effects throughout the shift  Outcome: Progressing  Goal: Free from acute confusion related to pain meds throughout the shift  Outcome: Progressing     Problem: Safety - Adult  Goal: Free from fall injury  Outcome: Progressing     Problem: Discharge Planning  Goal: Discharge to home or other facility with appropriate resources  Outcome: Progressing     Problem: Skin  Goal: Participates in plan/prevention/treatment measures  Outcome: Progressing  Flowsheets (Taken 3/10/2025 2352 by Shagufta Miguel RN)  Participates in plan/prevention/treatment measures: Elevate heels  Goal: Prevent/manage excess moisture  Outcome: Progressing  Flowsheets (Taken 3/10/2025 2352 by Shagufta Miguel RN)  Prevent/manage excess moisture: Cleanse incontinence/protect with barrier cream  Goal: Prevent/minimize sheer/friction injuries  Outcome: Progressing  Flowsheets (Taken 3/10/2025 2352 by Shagufta Miguel RN)  Prevent/minimize sheer/friction injuries:   Use pull sheet   Turn/reposition every 2 hours/use positioning/transfer devices   HOB 30 degrees or less  Goal: Promote/optimize nutrition  Outcome: Progressing  Flowsfariba (Taken 3/10/2025 2352 by Shagufta Miguel RN)  Promote/optimize nutrition:   Offer water/supplements/favorite foods   Monitor/record intake including meals   Assist with feeding  Goal: Promote skin healing  Outcome: Progressing  Luisito (Taken 3/10/2025 2352 by Shagufta Miguel  RN)  Promote skin healing:   Protective dressings over bony prominences   Turn/reposition every 2 hours/use positioning/transfer devices   Assess skin/pad under line(s)/device(s)

## 2025-03-12 NOTE — PROGRESS NOTES
03/12/25 1111   Discharge Planning   Living Arrangements Other (Comment)  (From [facility])   Support Systems Family members  (Brother- Gordo is POA)   Assistance Needed Patient is from [facility]. Per facility patient is A&Ox 1-2 at baseline, is on room air, ambulated with staff assist prior to fracture, is dependent of personal care and is incontinent. Able to feed self with set up assistance and cues. Patient's brother, who is patient's POA, is requesting a new SNF (other than [facility]). Brother is aware patient will likely need to return to [facility] after his skilled time is up at a new SNF unless the new SNF has a LTC bed for the patient. Of the facility preferences provided [facility] is able to accept and is FOC. Patient is medically ready for DC today. Patient's brother is agreeable to DC to [facility] today. Per [facility] patient will need to be transported late afternoon due to bed availability. Discharge  to send over needed documents to facility via [employer] and is aware to arrange transport for late afternoon (3PM-4PM).   Type of Residence Nursing home/residential care   Do you have animals or pets at home? No   Who is requesting discharge planning? Provider   Home or Post Acute Services Post acute facilities (Rehab/SNF/etc)   Type of Post Acute Facility Services Skilled nursing;Rehab   Expected Discharge Disposition SNF   Does the patient need discharge transport arranged? Yes   RoundTrip coordination needed? Yes   Has discharge transport been arranged? No

## 2025-03-12 NOTE — DISCHARGE SUMMARY
Discharge Diagnosis  Closed left hip fracture, initial encounter    Issues Requiring Follow-Up  Orthopedic surgery in 2 weeks for post-op evaluation  PCP at [facility] for post-hospitalization evaluation    Discharge Meds     Medication List      START taking these medications     amoxicillin-pot clavulanate 875-125 mg tablet; Commonly known as:   Augmentin; Take 1 tablet by mouth 2 times a day for 5 days.   heparin (porcine) 5,000 unit/mL injection; Inject 1 mL (5,000 Units)   under the skin every 8 hours. For one month until mobile or cleared by   orthopedic surgery   oxyCODONE 5 mg immediate release tablet; Commonly known as: Roxicodone;   Take 1 tablet (5 mg) by mouth every 6 hours if needed for severe pain (7 -   10) for up to 3 days.   pantoprazole 40 mg EC tablet; Commonly known as: ProtoNix; Take 1 tablet   (40 mg) by mouth once daily in the morning. Take before meals. Do not   crush, chew, or split.; Start taking on: March 13, 2025   sennosides-docusate sodium 8.6-50 mg tablet; Commonly known as:   Sarah-Colace; Take 2 tablets by mouth once daily at bedtime.   zinc oxide 40 % ointment ointment; Apply 1 Application topically 2 times   a day. Apply to groin and scrotum     CHANGE how you take these medications     furosemide 40 mg tablet; Commonly known as: Lasix; Take 0.5 tablets (20   mg) by mouth once daily.; What changed: how much to take     CONTINUE taking these medications     atorvastatin 40 mg tablet; Commonly known as: Lipitor   finasteride 5 mg tablet; Commonly known as: Proscar   FLUoxetine 10 mg capsule; Commonly known as: PROzac   potassium chloride CR 10 mEq ER tablet; Commonly known as: Klor-Con   tamsulosin 0.4 mg 24 hr capsule; Commonly known as: Flomax   traZODone 50 mg tablet; Commonly known as: Desyrel       Test Results Pending At Discharge  Pending Labs       Order Current Status    Surgical Pathology Exam In process            Hospital Course  83yo CM with PMH of dementia, diastolic HF, CAD,  CKD-III, and BPH that presented to the ED from memory care unit with leg pain and was admitted for left femoral neck fracture. Patient was seen by orthopedic surgery and underwent a left hip hemiarthroplasy on 3/9. Patient was found to also have bibasilar PNA with concern for aspiration so he was treated with IV antibiotics. Patient was also found to have E.coli UTI per culture and transitioned to oral augmentin. Patient was continued on home meds and appears medically stable for discharge to facility per PT/OT recs.    Medically cleared for discharge. Medications reviewed and reconciled. Scripts prepared as needed.  Discussed with the family, , and . Questions answered. Understanding verbalized. Overall time spent on discharge was longer than 35 min.      Pertinent Physical Exam At Time of Discharge  Constitutional: alert and oriented x1, awake, cooperative, no acute distress  Skin: warm and dry, left leg with post-op dressing in place without signs of bleeding or insurance  Head/Neck: Normocephalic, atraumatic  Eyes: clear sclera  Cardio: Regular rate and rhythm  Resp: Coarse breath sounds bilaterally, good respiratory effort  Gastrointestinal: Soft, nontender, nondistended  Musculoskeletal: LLE limited ROM; large swelling in right forerm POA per report without erythema or induration  Neuro: alert and oriented x 1  Psychological: Appropriate mood and behavior    Outpatient Follow-Up  Future Appointments   Date Time Provider Department Center   3/28/2025  9:45 AM NARESH [id number] [facility]         Evelyn Freeman DO

## 2025-03-12 NOTE — PROGRESS NOTES
Jabari Dumas is a 84 y.o. male on day 3 of admission presenting with Closed left hip fracture, initial encounter.      Subjective   Patient seen after lunch, laying in bed without any complaints. Patient is Aox1 which is reportedly baseline for him, TCC working with family to change facilities.    Of note, ortho coordinator called facility to confirm swelling on right forearm is chronic and not a result of injury leading to admission.     Objective     Last Recorded Vitals  /65 (BP Location: Right arm, Patient Position: Lying)   Pulse 84   Temp 36.6 °C (97.9 °F) (Temporal)   Resp 15   Wt 87.3 kg (192 lb 7.4 oz)   SpO2 96%   Intake/Output last 3 Shifts:    Intake/Output Summary (Last 24 hours) at 3/11/2025 2051  Last data filed at 3/11/2025 1741  Gross per 24 hour   Intake 360 ml   Output 1100 ml   Net -740 ml       Admission Weight  Weight: 79.4 kg (175 lb) (03/08/25 1125)    Daily Weight  03/09/25 : 87.3 kg (192 lb 7.4 oz)      Physical Exam  Constitutional: alert and oriented x1, awake, cooperative, no acute distress  Skin: warm and dry, left leg with post-op dressing in place without signs of bleeding or insurance  Head/Neck: Normocephalic, atraumatic  Eyes: clear sclera  Cardio: Regular rate and rhythm  Resp: Coarse breath sounds bilaterally, good respiratory effort  Gastrointestinal: Soft, nontender, nondistended  Musculoskeletal: LLE limited ROM; large swelling in right forerm POA per report without erythema or induration  Neuro: alert and oriented x 1  Psychological: Appropriate mood and behavior    Relevant Results  Scheduled medications  acetaminophen, 650 mg, oral, q4h   Or  acetaminophen, 650 mg, nasogastric tube, q4h   Or  acetaminophen, 650 mg, rectal, q4h  atorvastatin, 40 mg, oral, Daily  cefTRIAXone, 2 g, intravenous, q24h  finasteride, 5 mg, oral, Daily  FLUoxetine, 10 mg, oral, Daily  heparin, 5,000 Units, subcutaneous, q8h  pantoprazole, 40 mg, oral, Daily before  breakfast  sennosides-docusate sodium, 2 tablet, oral, Nightly  tamsulosin, 0.4 mg, oral, Daily  traZODone, 50 mg, oral, Nightly  zinc oxide, 1 Application, Topical, BID      Continuous medications     PRN medications  PRN medications: famotidine, ipratropium-albuteroL, melatonin, ondansetron, oxyCODONE, oxygen    Results for orders placed or performed during the hospital encounter of 03/08/25 (from the past 24 hours)   Renal Function Panel   Result Value Ref Range    Glucose 107 (H) 74 - 99 mg/dL    Sodium 137 136 - 145 mmol/L    Potassium 3.4 (L) 3.5 - 5.3 mmol/L    Chloride 101 98 - 107 mmol/L    Bicarbonate 28 21 - 32 mmol/L    Anion Gap 11 10 - 20 mmol/L    Urea Nitrogen 26 (H) 6 - 23 mg/dL    Creatinine 1.48 (H) 0.50 - 1.30 mg/dL    eGFR 46 (L) >60 mL/min/1.73m*2    Calcium 8.3 (L) 8.6 - 10.3 mg/dL    Phosphorus 2.7 2.5 - 4.9 mg/dL    Albumin 2.7 (L) 3.4 - 5.0 g/dL   Magnesium   Result Value Ref Range    Magnesium 2.16 1.60 - 2.40 mg/dL   CBC   Result Value Ref Range    WBC 10.6 4.4 - 11.3 x10*3/uL    nRBC 0.0 0.0 - 0.0 /100 WBCs    RBC 4.06 (L) 4.50 - 5.90 x10*6/uL    Hemoglobin 11.7 (L) 13.5 - 17.5 g/dL    Hematocrit 36.1 (L) 41.0 - 52.0 %    MCV 89 80 - 100 fL    MCH 28.8 26.0 - 34.0 pg    MCHC 32.4 32.0 - 36.0 g/dL    RDW 14.1 11.5 - 14.5 %    Platelets 356 150 - 450 x10*3/uL             Assessment & Plan    83yo CM with PMH of dementia, diastolic HF, CAD, CKD-III, and BPH that presented to the ED from memory care unit with leg pain and was admitted for left femoral neck fracture.     Acute displaced left femoral neck fracture  - noted on admitting imaging  - s/p left hip hemiarthroplasty on 3/9  - ortho following, WBAT  and discharged on lovenox  - needs follow up in two weeks with Dr. Buckley outpatient  - continue to monitor    Bibasilar PNA  - concern for possible aspiration  - speech consulted, ok for bite size solid and thin fluids  - completed azithromyin and almost done with ceftriaxone  - negative  legionella and strep    UTI  - urinalysis abnormal, initially covered with ceftriaxone  - urine culture growing enteric bacilli, pending sensitivities    CKD  - appears at baseline with Scr 1.3-1.5  - continue to monitor    CAD, Diastolic HF  - continue home meds    Dementia  - continue home meds    DVT Proph: lovenox    Dispo: Patient appears medically stable for discharge pending new facility choice from family.     Evelyn Freeman, DO

## 2025-03-20 LAB
LABORATORY COMMENT REPORT: NORMAL
PATH REPORT.FINAL DX SPEC: NORMAL
PATH REPORT.GROSS SPEC: NORMAL
PATH REPORT.RELEVANT HX SPEC: NORMAL
PATH REPORT.TOTAL CANCER: NORMAL

## 2025-03-20 NOTE — DOCUMENTATION CLARIFICATION NOTE
"    PATIENT:               JORGITO MEEK  ACCT #:                  [id number]  MRN:                       [id number]  :                       1940  ADMIT DATE:       3/8/2025 11:06 AM  DISCH DATE:        3/12/2025 5:45 PM  RESPONDING PROVIDER #:        [id number]          PROVIDER RESPONSE TEXT:    Traumatic fracture - Open/Closed i don't know what note this is referring to, its not something that i wrote. this is a closed traumatic fracture    CDI QUERY TEXT:    Clarification        Instruction:    Based on your assessment of the patient and the clinical information, please provide the requested documentation by clicking on the appropriate radio button and enter any additional information if prompted.    Question: Please further clarify specify if able left hip fracture site and type as    When answering this query, please exercise your independent professional judgment. The fact that a question is being asked, does not imply that any particular answer is desired or expected.    The patient's clinical indicators include:  Clinical Information:  84 year old admitted with left hip fracture.    Clinical Indicators:    -Per ER  \"Patient is a 84-year-old male from the memory care unit at [facility] presenting the emergency department with sudden onset of left lower leg pain. He is alert and oriented times himself which is at baseline. He is full code. There denies any injury or trauma per EMS. Did not fall did not hit his head. Patient is on tramadol for chronic pain per chart review.\"    Treatment:  Left hip replacement on 3/9.  OT/PT.    Risk Factors:  Elderly nursing home resident, history of falls, dementia.  Options provided:  -- Traumatic fracture - Open/Closed, Please specify additional information below  -- Pathologic fracture  -- Chronic fracture  -- Other - I will add my own diagnosis  -- Refer to Clinical Documentation Reviewer    Query created by: Bertha Shipman on 3/18/2025 1:51 " PM      Electronically signed by:  ZOEY CASSIDY MD 3/20/2025 11:43 AM

## 2025-03-28 ENCOUNTER — TELEPHONE (OUTPATIENT)
Dept: ORTHOPEDIC SURGERY | Facility: CLINIC | Age: 85
End: 2025-03-28

## 2025-03-28 ENCOUNTER — APPOINTMENT (OUTPATIENT)
Dept: ORTHOPEDIC SURGERY | Facility: CLINIC | Age: 85
End: 2025-03-28
Payer: MEDICARE

## 2025-03-28 ENCOUNTER — HOSPITAL ENCOUNTER (OUTPATIENT)
Dept: RADIOLOGY | Facility: HOSPITAL | Age: 85
Discharge: HOME | End: 2025-03-28
Payer: MEDICARE

## 2025-03-28 DIAGNOSIS — Z87.81 S/P ORIF (OPEN REDUCTION INTERNAL FIXATION) FRACTURE: ICD-10-CM

## 2025-03-28 DIAGNOSIS — Z98.890 S/P ORIF (OPEN REDUCTION INTERNAL FIXATION) FRACTURE: ICD-10-CM

## 2025-03-28 PROCEDURE — 73502 X-RAY EXAM HIP UNI 2-3 VIEWS: CPT | Mod: LT

## 2025-03-28 PROCEDURE — 1111F DSCHRG MED/CURRENT MED MERGE: CPT

## 2025-03-28 PROCEDURE — 1159F MED LIST DOCD IN RCRD: CPT

## 2025-03-28 PROCEDURE — 99024 POSTOP FOLLOW-UP VISIT: CPT

## 2025-03-28 NOTE — TELEPHONE ENCOUNTER
Alberto from [facility] called and wanted to know what happened at today's appointment and about the patients dressing      Waiting for a call back

## 2025-03-28 NOTE — PROGRESS NOTES
Chief Complaint   Patient presents with    Left Hip - Post-op     3/9/25 LT HIP HEMIARTHROPLASTY         This is a 84 y.o. male who is 2 weeks out from left hip hemiarthroplasty.  Patient is unable to provide his own history and his brother is here to provide information.  Pain is under control with current medications.  No drainage from his incision no fevers or chills.  Progressing well with physical therapy and appropriately improving in function.  Patient is a fall risk and has fallen a few times in the past 2 weeks.  Although he has fallen he seems to be in good spirits and doing well    Left hip examination: Surgical incision well approximated no erythema no drainage.  no pain with range of motion neurovascular tact distally    X-rays of the hip were reviewed independently interpreted by me today, the show stable left Valdemar-arthroplasty no fracture dislocation or loosening    Impression plan: 84 y.o. male 2 weeks out from left hemiarthroplasty.  We discussed continuing physical therapy and exercise program. Pain medications to be used as needed. Assistive devices as needed per PT. We discussed DVT prophylaxis until 6 weeks. No dentist until 3 months and thereafter dental prophylaxis for life. Activity as tolerated weightbearing as tolerated, hip precautions until 3 months. I have personally reviewed the OARRS report for the patient. This report is scanned into the electronic medical record. I have considered the risks of abuse, dependence, addiction and diversion. Follow up 4 weeks with xrays.

## 2025-04-02 ENCOUNTER — APPOINTMENT (OUTPATIENT)
Dept: ORTHOPEDIC SURGERY | Facility: CLINIC | Age: 85
End: 2025-04-02
Payer: MEDICARE

## 2025-04-10 ENCOUNTER — TELEPHONE (OUTPATIENT)
Dept: ORTHOPEDIC SURGERY | Facility: CLINIC | Age: 85
End: 2025-04-10
Payer: MEDICARE

## 2025-04-10 NOTE — TELEPHONE ENCOUNTER
I spoke to the wound care nurse at [facility] and she will be sending over an image of the wound via email

## 2025-04-10 NOTE — TELEPHONE ENCOUNTER
03/09/25 LT HIP ALLA ARTHOPLASTY    Pts brother called stating Jabari seemed to have an infection on his incision. We advised him to go to the ED or use our walk in clinic here at [facility]. His brother stated that the nursing home that Jbaari is in messaged  and is waiting for a response I told them you guys were in surgery, The facility said they are able to treat him they just needed to know what to do, his brother said the incision seems to be draining and looks infected but he never touched it so he is unsure if it warm to touch     Facility - [facility]  # - [phone]

## 2025-04-11 ENCOUNTER — OFFICE VISIT (OUTPATIENT)
Dept: ORTHOPEDIC SURGERY | Facility: CLINIC | Age: 85
End: 2025-04-11
Payer: MEDICARE

## 2025-04-11 DIAGNOSIS — Z87.81 S/P ORIF (OPEN REDUCTION INTERNAL FIXATION) FRACTURE: Primary | ICD-10-CM

## 2025-04-11 DIAGNOSIS — Z98.890 S/P ORIF (OPEN REDUCTION INTERNAL FIXATION) FRACTURE: Primary | ICD-10-CM

## 2025-04-11 PROCEDURE — 99024 POSTOP FOLLOW-UP VISIT: CPT

## 2025-04-11 PROCEDURE — 1111F DSCHRG MED/CURRENT MED MERGE: CPT

## 2025-04-11 PROCEDURE — 1159F MED LIST DOCD IN RCRD: CPT

## 2025-04-11 RX ORDER — DOXYCYCLINE 100 MG/1
100 CAPSULE ORAL 2 TIMES DAILY
Qty: 20 CAPSULE | Refills: 0 | Status: CANCELLED | OUTPATIENT
Start: 2025-04-11 | End: 2025-04-21

## 2025-04-11 ASSESSMENT — PAIN - FUNCTIONAL ASSESSMENT: PAIN_FUNCTIONAL_ASSESSMENT: 0-10

## 2025-04-11 NOTE — PROGRESS NOTES
[facility] antibitoics and putnew bandage on do dressing changes and monitor wound for the next week   Chief Complaint   Patient presents with    Left Hip - Post-op     3/9/25 LT HIP HEMIARTHROPLASTY         This is a 84 y.o. male who is 4 weeks out from left  hip hemiarthroplasty.  Pain is under control with current medications.  There is some drainage from his incision no fevers or chills.  Progressing well with physical therapy and appropriately improving in function.     Left hip examination: Surgical incision well approximated but the middle of the incision popped open and is slightly draining. There is white and yellow granulation tissue inside the quarter size wound that is open.  no pain with range of motion neurovascular tact distally    X-rays of the hip were reviewed independently interpreted by me today, the show stable left hip adan-arthroplasty no fracture dislocation or loosening    Impression plan: 84 y.o. male 4 weeks out from left hip hemiarthroplasty.  Patient's incision opened up in the center of the incision and I removed the bandage that was currently on it.  I placed a new Mepilex bandage over top to keep it covered and dry.  If the wound continuously drains fluid and soaks the bandage it should be removed and regular dressing changes is needed by the wound care nurse.  I would like to place the patient on 10 days of doxycycline to see if this will help improve healing and help prevent infection.  I will call [facility] to see if I should place a verbal order for this antibiotic or if I can place it at a pharmacy.  If there is no improvement in the next week or so I think it would be wise to go to a wound care facility that would be able to debride and care for this wound.  Patient should follow-up with me in a week or so to see how he is healing and determine if referral to wound care is necessary.  Patient should be weightbearing as tolerated and assistive devices should be  used as necessary.  We can get x-rays at his next follow-up. I spoke with a nurse at [facility] and she took verbal orders for the doxycycline and plan of care for his wound. [phone] fax number

## 2025-04-15 ENCOUNTER — OFFICE VISIT (OUTPATIENT)
Dept: ORTHOPEDIC SURGERY | Facility: CLINIC | Age: 85
End: 2025-04-15
Payer: MEDICARE

## 2025-04-15 ENCOUNTER — TELEPHONE (OUTPATIENT)
Dept: ORTHOPEDIC SURGERY | Facility: CLINIC | Age: 85
End: 2025-04-15

## 2025-04-15 ENCOUNTER — HOSPITAL ENCOUNTER (OUTPATIENT)
Dept: RADIOLOGY | Facility: HOSPITAL | Age: 85
Discharge: HOME | End: 2025-04-15
Payer: MEDICARE

## 2025-04-15 DIAGNOSIS — Z96.642 HISTORY OF LEFT HIP HEMIARTHROPLASTY: Primary | ICD-10-CM

## 2025-04-15 DIAGNOSIS — Z96.642 HISTORY OF LEFT HIP HEMIARTHROPLASTY: ICD-10-CM

## 2025-04-15 PROCEDURE — 73502 X-RAY EXAM HIP UNI 2-3 VIEWS: CPT | Mod: LT

## 2025-04-15 PROCEDURE — 99024 POSTOP FOLLOW-UP VISIT: CPT

## 2025-04-15 PROCEDURE — 1159F MED LIST DOCD IN RCRD: CPT

## 2025-04-15 PROCEDURE — 73502 X-RAY EXAM HIP UNI 2-3 VIEWS: CPT | Mod: LEFT SIDE | Performed by: RADIOLOGY

## 2025-04-15 ASSESSMENT — PAIN - FUNCTIONAL ASSESSMENT: PAIN_FUNCTIONAL_ASSESSMENT: 0-10

## 2025-04-15 NOTE — TELEPHONE ENCOUNTER
Called to see if his brother was currently at his appointment. Would like to speak with Asher or his MA. He wants information regarding Jabari horton today. Requesting a call back.

## 2025-04-15 NOTE — PROGRESS NOTES
Chief Complaint   Patient presents with    Left Hip - Post-op     3/9/25 LT HIP HEMIARTHROPLASTY         This is a 84 y.o. male who is 5 weeks out from left hip hemiarthroplasty.  Pain is under control with current medications.  Some drainage from his incision no fevers or chills.  Progressing well with physical therapy and appropriately improving in function.  Patient's incision has been receiving dressing changes often.    Left hip examination: Surgical incision has an opening in the center that is opened and is draining.  It appears to be slightly more healed than what I saw 4 days ago.  Dressing was changed today and there was discharge but it was not fully soaked.  No pain with range of motion neurovascular tact distally    X-rays of the hip were reviewed independently interpreted by me today, the show stable total hip arthroplasty no fracture dislocation or loosening    Impression plan: 84 y.o. male 6 weeks out from left total hip.  We discussed continuing physical therapy as tolerated in the nursing home. Pain medications to be used as needed. Assistive devices as needed per PT. We discussed DVT prophylaxis until 6 weeks. No dentist until 3 months and thereafter dental prophylaxis for life. Activity as tolerated weightbearing as tolerated, hip precautions until 3 months. I have personally reviewed the OARRS report for the patient. This report is scanned into the electronic medical record. I have considered the risks of abuse, dependence, addiction and diversion.  Patient's incision seems to be responding to the antibiotic based on the progress of pain in the last 4 days.  She continue with dressing changes and I told him to set up an appointment with wound care to be evaluated.  Patient should come see me a week after wound care appointment to see how it is progressing.

## 2025-04-21 ENCOUNTER — OFFICE VISIT (OUTPATIENT)
Dept: WOUND CARE | Facility: HOSPITAL | Age: 85
End: 2025-04-21
Payer: MEDICARE

## 2025-04-21 DIAGNOSIS — Z96.642 HISTORY OF LEFT HIP HEMIARTHROPLASTY: ICD-10-CM

## 2025-04-21 DIAGNOSIS — S71.102A OPEN THIGH WOUND, LEFT, INITIAL ENCOUNTER: Primary | ICD-10-CM

## 2025-04-21 PROCEDURE — 99214 OFFICE O/P EST MOD 30 MIN: CPT | Performed by: SURGERY

## 2025-04-21 PROCEDURE — 99213 OFFICE O/P EST LOW 20 MIN: CPT

## 2025-04-21 PROCEDURE — 97602 WOUND(S) CARE NON-SELECTIVE: CPT | Performed by: SURGERY

## 2025-04-21 PROCEDURE — 97602 WOUND(S) CARE NON-SELECTIVE: CPT

## 2025-04-25 ENCOUNTER — APPOINTMENT (OUTPATIENT)
Dept: ORTHOPEDIC SURGERY | Facility: CLINIC | Age: 85
End: 2025-04-25
Payer: MEDICARE

## 2025-04-27 LAB
BACTERIA SPEC AEROBE CULT: ABNORMAL
BACTERIA SPEC ANAEROBE CULT: ABNORMAL

## 2025-04-28 ENCOUNTER — OFFICE VISIT (OUTPATIENT)
Dept: WOUND CARE | Facility: HOSPITAL | Age: 85
End: 2025-04-28
Payer: MEDICARE

## 2025-04-28 PROCEDURE — 11042 DBRDMT SUBQ TIS 1ST 20SQCM/<: CPT

## 2025-04-28 PROCEDURE — 11042 DBRDMT SUBQ TIS 1ST 20SQCM/<: CPT | Performed by: SURGERY

## 2025-05-02 ENCOUNTER — APPOINTMENT (OUTPATIENT)
Dept: ORTHOPEDIC SURGERY | Facility: CLINIC | Age: 85
End: 2025-05-02
Payer: COMMERCIAL

## 2025-05-06 ENCOUNTER — OFFICE VISIT (OUTPATIENT)
Dept: ORTHOPEDIC SURGERY | Facility: CLINIC | Age: 85
End: 2025-05-06
Payer: MEDICARE

## 2025-05-06 ENCOUNTER — APPOINTMENT (OUTPATIENT)
Dept: ORTHOPEDIC SURGERY | Facility: CLINIC | Age: 85
End: 2025-05-06
Payer: COMMERCIAL

## 2025-05-06 DIAGNOSIS — Z96.642 HISTORY OF LEFT HIP HEMIARTHROPLASTY: Primary | ICD-10-CM

## 2025-05-06 PROCEDURE — 99024 POSTOP FOLLOW-UP VISIT: CPT

## 2025-05-06 PROCEDURE — 1159F MED LIST DOCD IN RCRD: CPT

## 2025-05-06 RX ORDER — DOXYCYCLINE 100 MG/1
100 CAPSULE ORAL 2 TIMES DAILY
Qty: 20 CAPSULE | Refills: 0 | Status: ON HOLD | OUTPATIENT
Start: 2025-05-06 | End: 2025-05-16

## 2025-05-06 NOTE — PROGRESS NOTES
Welcome call to make sure he is still on antibitoics, incison seems to be getting smaller, they are packing it twice a day and he is seeing wound care, no muscle involvement    Chief Complaint   Patient presents with    Left Hip - Follow-up, Post-op     WOUND CHECK - 3/9/25 LT HIP HEMIARTHROPLASTY- Pt here today with his brother-He states that he is seeing wound care and they had mentioned him possibly needing a wound vac         This is a 84 y.o. male who is 8 weeks out from left hip hemiarthroplasty total hip.  Pain is under control with current medications.  No drainage from his incision no fevers or chills.  Patient is doing well and they are packing his incision daily and seems to be improving. Patients brother is not sure if he is still taking the antibiotic at [place] so we will check on that. He is seeing wound care on Mondays here at [facility] and they were told there is no muscle involvement.     Left hip examination: Surgical incision is looking better with the open wound getting smaller. Packing is in place and looks good, no drainage.  no pain with range of motion neurovascular tact distally    X-rays of the hip were reviewed independently interpreted by me today, the show stable total hip arthroplasty no fracture dislocation or loosening    Impression plan: 84 y.o. male 8 weeks out from left hip hemiarthroplasty.  We discussed continuing physical therapy and home exercise program. Pain medications to be used as needed. Assistive devices as needed per PT. We discussed DVT prophylaxis until 6 weeks. No dentist until 3 months and thereafter dental prophylaxis for life. Activity as tolerated weightbearing as tolerated, hip precautions until 3 months. I have personally reviewed the OARRS report for the patient. This report is scanned into the electronic medical record. I have considered the risks of abuse, dependence, addiction and diversion. Follow up 1-2 months with xrays

## 2025-05-12 ENCOUNTER — OFFICE VISIT (OUTPATIENT)
Dept: WOUND CARE | Facility: HOSPITAL | Age: 85
End: 2025-05-12
Payer: MEDICARE

## 2025-05-12 PROCEDURE — 99213 OFFICE O/P EST LOW 20 MIN: CPT | Performed by: SURGERY

## 2025-05-15 ENCOUNTER — HOSPITAL ENCOUNTER (INPATIENT)
Facility: HOSPITAL | Age: 85
DRG: 177 | End: 2025-05-15
Attending: EMERGENCY MEDICINE | Admitting: INTERNAL MEDICINE
Payer: MEDICARE

## 2025-05-15 ENCOUNTER — APPOINTMENT (OUTPATIENT)
Dept: RADIOLOGY | Facility: HOSPITAL | Age: 85
End: 2025-05-15
Payer: MEDICARE

## 2025-05-15 ENCOUNTER — APPOINTMENT (OUTPATIENT)
Dept: CARDIOLOGY | Facility: HOSPITAL | Age: 85
End: 2025-05-15
Payer: MEDICARE

## 2025-05-15 DIAGNOSIS — R41.82 ALTERED MENTAL STATUS, UNSPECIFIED ALTERED MENTAL STATUS TYPE: Primary | ICD-10-CM

## 2025-05-15 DIAGNOSIS — R29.6 FREQUENT FALLS: ICD-10-CM

## 2025-05-15 DIAGNOSIS — R78.81 BACTEREMIA: ICD-10-CM

## 2025-05-15 DIAGNOSIS — S71.002A OPEN WOUND OF LEFT HIP, INITIAL ENCOUNTER: ICD-10-CM

## 2025-05-15 DIAGNOSIS — N30.01 ACUTE CYSTITIS WITH HEMATURIA: ICD-10-CM

## 2025-05-15 DIAGNOSIS — Z96.642 HISTORY OF LEFT HIP HEMIARTHROPLASTY: ICD-10-CM

## 2025-05-15 DIAGNOSIS — T82.898S: ICD-10-CM

## 2025-05-15 DIAGNOSIS — J18.9 PNEUMONIA OF BOTH LUNGS DUE TO INFECTIOUS ORGANISM, UNSPECIFIED PART OF LUNG: ICD-10-CM

## 2025-05-15 DIAGNOSIS — R93.89 ABNORMAL CT SCAN: ICD-10-CM

## 2025-05-15 DIAGNOSIS — N17.9 ACUTE KIDNEY INJURY: ICD-10-CM

## 2025-05-15 LAB
ALBUMIN SERPL BCP-MCNC: 3.4 G/DL (ref 3.4–5)
ALP SERPL-CCNC: 118 U/L (ref 33–136)
ALT SERPL W P-5'-P-CCNC: 15 U/L (ref 10–52)
ANION GAP SERPL CALCULATED.3IONS-SCNC: 14 MMOL/L (ref 10–20)
APPEARANCE UR: ABNORMAL
AST SERPL W P-5'-P-CCNC: 20 U/L (ref 9–39)
BASOPHILS # BLD AUTO: 0.02 X10*3/UL (ref 0–0.1)
BASOPHILS NFR BLD AUTO: 0.3 %
BILIRUB SERPL-MCNC: 0.6 MG/DL (ref 0–1.2)
BILIRUB UR STRIP.AUTO-MCNC: NEGATIVE MG/DL
BUN SERPL-MCNC: 19 MG/DL (ref 6–23)
CALCIUM SERPL-MCNC: 9.5 MG/DL (ref 8.6–10.3)
CHLORIDE SERPL-SCNC: 106 MMOL/L (ref 98–107)
CO2 SERPL-SCNC: 26 MMOL/L (ref 21–32)
COLOR UR: YELLOW
CREAT SERPL-MCNC: 1.65 MG/DL (ref 0.5–1.3)
EGFRCR SERPLBLD CKD-EPI 2021: 40 ML/MIN/1.73M*2
EOSINOPHIL # BLD AUTO: 0.06 X10*3/UL (ref 0–0.4)
EOSINOPHIL NFR BLD AUTO: 0.9 %
ERYTHROCYTE [DISTWIDTH] IN BLOOD BY AUTOMATED COUNT: 15.6 % (ref 11.5–14.5)
GLUCOSE SERPL-MCNC: 128 MG/DL (ref 74–99)
GLUCOSE UR STRIP.AUTO-MCNC: NORMAL MG/DL
HCT VFR BLD AUTO: 39.2 % (ref 41–52)
HGB BLD-MCNC: 12.4 G/DL (ref 13.5–17.5)
HYALINE CASTS #/AREA URNS AUTO: ABNORMAL /LPF
IMM GRANULOCYTES # BLD AUTO: 0.02 X10*3/UL (ref 0–0.5)
IMM GRANULOCYTES NFR BLD AUTO: 0.3 % (ref 0–0.9)
KETONES UR STRIP.AUTO-MCNC: ABNORMAL MG/DL
LACTATE SERPL-SCNC: 1.5 MMOL/L (ref 0.4–2)
LEUKOCYTE ESTERASE UR QL STRIP.AUTO: ABNORMAL
LYMPHOCYTES # BLD AUTO: 0.9 X10*3/UL (ref 0.8–3)
LYMPHOCYTES NFR BLD AUTO: 14.2 %
MCH RBC QN AUTO: 27.9 PG (ref 26–34)
MCHC RBC AUTO-ENTMCNC: 31.6 G/DL (ref 32–36)
MCV RBC AUTO: 88 FL (ref 80–100)
MONOCYTES # BLD AUTO: 0.78 X10*3/UL (ref 0.05–0.8)
MONOCYTES NFR BLD AUTO: 12.3 %
NEUTROPHILS # BLD AUTO: 4.58 X10*3/UL (ref 1.6–5.5)
NEUTROPHILS NFR BLD AUTO: 72 %
NITRITE UR QL STRIP.AUTO: NEGATIVE
NRBC BLD-RTO: 0 /100 WBCS (ref 0–0)
PH UR STRIP.AUTO: 7 [PH]
PLATELET # BLD AUTO: 286 X10*3/UL (ref 150–450)
POTASSIUM SERPL-SCNC: 3.8 MMOL/L (ref 3.5–5.3)
PROT SERPL-MCNC: 6.8 G/DL (ref 6.4–8.2)
PROT UR STRIP.AUTO-MCNC: ABNORMAL MG/DL
RBC # BLD AUTO: 4.44 X10*6/UL (ref 4.5–5.9)
RBC # UR STRIP.AUTO: ABNORMAL MG/DL
RBC #/AREA URNS AUTO: ABNORMAL /HPF
SODIUM SERPL-SCNC: 142 MMOL/L (ref 136–145)
SP GR UR STRIP.AUTO: 1.02
SQUAMOUS #/AREA URNS AUTO: ABNORMAL /HPF
UROBILINOGEN UR STRIP.AUTO-MCNC: ABNORMAL MG/DL
WBC # BLD AUTO: 6.4 X10*3/UL (ref 4.4–11.3)
WBC #/AREA URNS AUTO: >50 /HPF

## 2025-05-15 PROCEDURE — 85025 COMPLETE CBC W/AUTO DIFF WBC: CPT | Performed by: EMERGENCY MEDICINE

## 2025-05-15 PROCEDURE — 87077 CULTURE AEROBIC IDENTIFY: CPT | Mod: TRILAB | Performed by: CLINICAL NURSE SPECIALIST

## 2025-05-15 PROCEDURE — 87186 SC STD MICRODIL/AGAR DIL: CPT | Mod: TRILAB | Performed by: EMERGENCY MEDICINE

## 2025-05-15 PROCEDURE — 36415 COLL VENOUS BLD VENIPUNCTURE: CPT | Performed by: CLINICAL NURSE SPECIALIST

## 2025-05-15 PROCEDURE — 2500000004 HC RX 250 GENERAL PHARMACY W/ HCPCS (ALT 636 FOR OP/ED): Performed by: EMERGENCY MEDICINE

## 2025-05-15 PROCEDURE — 71260 CT THORAX DX C+: CPT

## 2025-05-15 PROCEDURE — 93005 ELECTROCARDIOGRAM TRACING: CPT

## 2025-05-15 PROCEDURE — 96375 TX/PRO/DX INJ NEW DRUG ADDON: CPT

## 2025-05-15 PROCEDURE — 96361 HYDRATE IV INFUSION ADD-ON: CPT

## 2025-05-15 PROCEDURE — 71260 CT THORAX DX C+: CPT | Performed by: RADIOLOGY

## 2025-05-15 PROCEDURE — 87077 CULTURE AEROBIC IDENTIFY: CPT | Mod: TRILAB | Performed by: EMERGENCY MEDICINE

## 2025-05-15 PROCEDURE — 72125 CT NECK SPINE W/O DYE: CPT

## 2025-05-15 PROCEDURE — 96365 THER/PROPH/DIAG IV INF INIT: CPT

## 2025-05-15 PROCEDURE — 83605 ASSAY OF LACTIC ACID: CPT | Performed by: CLINICAL NURSE SPECIALIST

## 2025-05-15 PROCEDURE — 80053 COMPREHEN METABOLIC PANEL: CPT | Performed by: EMERGENCY MEDICINE

## 2025-05-15 PROCEDURE — 81001 URINALYSIS AUTO W/SCOPE: CPT | Performed by: EMERGENCY MEDICINE

## 2025-05-15 PROCEDURE — 93010 ELECTROCARDIOGRAM REPORT: CPT | Performed by: INTERNAL MEDICINE

## 2025-05-15 PROCEDURE — 70450 CT HEAD/BRAIN W/O DYE: CPT | Performed by: RADIOLOGY

## 2025-05-15 PROCEDURE — 96366 THER/PROPH/DIAG IV INF ADDON: CPT

## 2025-05-15 PROCEDURE — 96376 TX/PRO/DX INJ SAME DRUG ADON: CPT

## 2025-05-15 PROCEDURE — 2500000004 HC RX 250 GENERAL PHARMACY W/ HCPCS (ALT 636 FOR OP/ED): Mod: JZ | Performed by: CLINICAL NURSE SPECIALIST

## 2025-05-15 PROCEDURE — 70450 CT HEAD/BRAIN W/O DYE: CPT

## 2025-05-15 PROCEDURE — 72125 CT NECK SPINE W/O DYE: CPT | Performed by: RADIOLOGY

## 2025-05-15 PROCEDURE — 99285 EMERGENCY DEPT VISIT HI MDM: CPT | Mod: 25 | Performed by: EMERGENCY MEDICINE

## 2025-05-15 PROCEDURE — 87075 CULTR BACTERIA EXCEPT BLOOD: CPT | Mod: TRILAB | Performed by: CLINICAL NURSE SPECIALIST

## 2025-05-15 PROCEDURE — 1100000001 HC PRIVATE ROOM DAILY

## 2025-05-15 PROCEDURE — 36415 COLL VENOUS BLD VENIPUNCTURE: CPT | Performed by: EMERGENCY MEDICINE

## 2025-05-15 PROCEDURE — 74177 CT ABD & PELVIS W/CONTRAST: CPT | Performed by: RADIOLOGY

## 2025-05-15 PROCEDURE — 2550000001 HC RX 255 CONTRASTS: Mod: JZ | Performed by: EMERGENCY MEDICINE

## 2025-05-15 RX ORDER — CEFTRIAXONE 1 G/50ML
1 INJECTION, SOLUTION INTRAVENOUS ONCE
Status: COMPLETED | OUTPATIENT
Start: 2025-05-15 | End: 2025-05-15

## 2025-05-15 RX ORDER — AMOXICILLIN 250 MG
2 CAPSULE ORAL NIGHTLY
Status: DISCONTINUED | OUTPATIENT
Start: 2025-05-16 | End: 2025-05-22 | Stop reason: HOSPADM

## 2025-05-15 RX ORDER — ATORVASTATIN CALCIUM 40 MG/1
40 TABLET, FILM COATED ORAL NIGHTLY
Status: DISCONTINUED | OUTPATIENT
Start: 2025-05-16 | End: 2025-05-22 | Stop reason: HOSPADM

## 2025-05-15 RX ORDER — POTASSIUM CHLORIDE 750 MG/1
10 TABLET, FILM COATED, EXTENDED RELEASE ORAL
Status: DISCONTINUED | OUTPATIENT
Start: 2025-05-16 | End: 2025-05-22 | Stop reason: HOSPADM

## 2025-05-15 RX ORDER — FUROSEMIDE 20 MG/1
20 TABLET ORAL DAILY
Status: DISCONTINUED | OUTPATIENT
Start: 2025-05-16 | End: 2025-05-22 | Stop reason: HOSPADM

## 2025-05-15 RX ORDER — ACETAMINOPHEN 650 MG/1
650 SUPPOSITORY RECTAL EVERY 4 HOURS PRN
Status: DISCONTINUED | OUTPATIENT
Start: 2025-05-15 | End: 2025-05-22 | Stop reason: HOSPADM

## 2025-05-15 RX ORDER — PANTOPRAZOLE SODIUM 40 MG/1
40 TABLET, DELAYED RELEASE ORAL
Status: DISCONTINUED | OUTPATIENT
Start: 2025-05-16 | End: 2025-05-22 | Stop reason: HOSPADM

## 2025-05-15 RX ORDER — FUROSEMIDE 20 MG/1
20 TABLET ORAL DAILY
COMMUNITY
Start: 2025-05-07 | End: 2025-05-22 | Stop reason: HOSPADM

## 2025-05-15 RX ORDER — POLYETHYLENE GLYCOL 3350 17 G/17G
17 POWDER, FOR SOLUTION ORAL DAILY PRN
Status: DISCONTINUED | OUTPATIENT
Start: 2025-05-15 | End: 2025-05-22 | Stop reason: HOSPADM

## 2025-05-15 RX ORDER — EAR PLUGS
1 EACH OTIC (EAR) 2 TIMES DAILY
Status: DISCONTINUED | OUTPATIENT
Start: 2025-05-16 | End: 2025-05-22 | Stop reason: HOSPADM

## 2025-05-15 RX ORDER — LORAZEPAM 2 MG/ML
0.5 INJECTION INTRAMUSCULAR ONCE
Status: COMPLETED | OUTPATIENT
Start: 2025-05-15 | End: 2025-05-15

## 2025-05-15 RX ORDER — HEPARIN SODIUM 5000 [USP'U]/ML
5000 INJECTION, SOLUTION INTRAVENOUS; SUBCUTANEOUS EVERY 8 HOURS SCHEDULED
Status: DISCONTINUED | OUTPATIENT
Start: 2025-05-16 | End: 2025-05-22 | Stop reason: HOSPADM

## 2025-05-15 RX ORDER — GUAIFENESIN/DEXTROMETHORPHAN 100-10MG/5
5 SYRUP ORAL EVERY 4 HOURS PRN
Status: DISCONTINUED | OUTPATIENT
Start: 2025-05-15 | End: 2025-05-22 | Stop reason: HOSPADM

## 2025-05-15 RX ORDER — ACETAMINOPHEN 325 MG/1
650 TABLET ORAL EVERY 4 HOURS PRN
Status: DISCONTINUED | OUTPATIENT
Start: 2025-05-15 | End: 2025-05-22 | Stop reason: HOSPADM

## 2025-05-15 RX ORDER — OLANZAPINE 10 MG/2ML
2.5 INJECTION, POWDER, FOR SOLUTION INTRAMUSCULAR EVERY 4 HOURS PRN
Status: DISCONTINUED | OUTPATIENT
Start: 2025-05-15 | End: 2025-05-22 | Stop reason: HOSPADM

## 2025-05-15 RX ORDER — OXYCODONE HYDROCHLORIDE 5 MG/1
5 TABLET ORAL EVERY 6 HOURS PRN
COMMUNITY

## 2025-05-15 RX ORDER — ACETAMINOPHEN 160 MG/5ML
650 SOLUTION ORAL EVERY 4 HOURS PRN
Status: DISCONTINUED | OUTPATIENT
Start: 2025-05-15 | End: 2025-05-22 | Stop reason: HOSPADM

## 2025-05-15 RX ORDER — FLUOXETINE 10 MG/1
10 CAPSULE ORAL DAILY
Status: DISCONTINUED | OUTPATIENT
Start: 2025-05-16 | End: 2025-05-22 | Stop reason: HOSPADM

## 2025-05-15 RX ORDER — OXYCODONE HYDROCHLORIDE 5 MG/1
5 TABLET ORAL EVERY 6 HOURS PRN
Refills: 0 | Status: DISCONTINUED | OUTPATIENT
Start: 2025-05-15 | End: 2025-05-22 | Stop reason: HOSPADM

## 2025-05-15 RX ORDER — ONDANSETRON HYDROCHLORIDE 2 MG/ML
4 INJECTION, SOLUTION INTRAVENOUS EVERY 8 HOURS PRN
Status: DISCONTINUED | OUTPATIENT
Start: 2025-05-15 | End: 2025-05-22 | Stop reason: HOSPADM

## 2025-05-15 RX ORDER — TAMSULOSIN HYDROCHLORIDE 0.4 MG/1
0.4 CAPSULE ORAL DAILY
Status: DISCONTINUED | OUTPATIENT
Start: 2025-05-16 | End: 2025-05-22 | Stop reason: HOSPADM

## 2025-05-15 RX ORDER — TRAZODONE HYDROCHLORIDE 50 MG/1
50 TABLET ORAL NIGHTLY
Status: DISCONTINUED | OUTPATIENT
Start: 2025-05-16 | End: 2025-05-22 | Stop reason: HOSPADM

## 2025-05-15 RX ORDER — ONDANSETRON 4 MG/1
4 TABLET, ORALLY DISINTEGRATING ORAL EVERY 8 HOURS PRN
Status: DISCONTINUED | OUTPATIENT
Start: 2025-05-15 | End: 2025-05-22 | Stop reason: HOSPADM

## 2025-05-15 RX ORDER — GUAIFENESIN 600 MG/1
600 TABLET, EXTENDED RELEASE ORAL EVERY 12 HOURS PRN
Status: DISCONTINUED | OUTPATIENT
Start: 2025-05-15 | End: 2025-05-22 | Stop reason: HOSPADM

## 2025-05-15 RX ORDER — TALC
6 POWDER (GRAM) TOPICAL NIGHTLY PRN
Status: DISCONTINUED | OUTPATIENT
Start: 2025-05-15 | End: 2025-05-22 | Stop reason: HOSPADM

## 2025-05-15 RX ORDER — FINASTERIDE 5 MG/1
5 TABLET, FILM COATED ORAL DAILY
Status: DISCONTINUED | OUTPATIENT
Start: 2025-05-16 | End: 2025-05-22 | Stop reason: HOSPADM

## 2025-05-15 RX ADMIN — LORAZEPAM 0.5 MG: 2 INJECTION INTRAMUSCULAR; INTRAVENOUS at 19:19

## 2025-05-15 RX ADMIN — IOHEXOL 75 ML: 350 INJECTION, SOLUTION INTRAVENOUS at 20:21

## 2025-05-15 RX ADMIN — CEFTRIAXONE 1 G: 1 INJECTION, SOLUTION INTRAVENOUS at 19:00

## 2025-05-15 RX ADMIN — LORAZEPAM 0.5 MG: 2 INJECTION INTRAMUSCULAR; INTRAVENOUS at 17:58

## 2025-05-15 RX ADMIN — DEXTROSE MONOHYDRATE 500 MG: 50 INJECTION, SOLUTION INTRAVENOUS at 21:41

## 2025-05-15 RX ADMIN — SODIUM CHLORIDE 1000 ML: 900 INJECTION, SOLUTION INTRAVENOUS at 16:21

## 2025-05-15 SDOH — SOCIAL STABILITY: SOCIAL INSECURITY: HAVE YOU HAD THOUGHTS OF HARMING ANYONE ELSE?: UNABLE TO ASSESS

## 2025-05-15 SDOH — HEALTH STABILITY: MENTAL HEALTH: HOW OFTEN DO YOU HAVE SIX OR MORE DRINKS ON ONE OCCASION?: NEVER

## 2025-05-15 SDOH — ECONOMIC STABILITY: HOUSING INSECURITY: AT ANY TIME IN THE PAST 12 MONTHS, WERE YOU HOMELESS OR LIVING IN A SHELTER (INCLUDING NOW)?: PATIENT UNABLE TO ANSWER

## 2025-05-15 SDOH — ECONOMIC STABILITY: FOOD INSECURITY
WITHIN THE PAST 12 MONTHS, THE FOOD YOU BOUGHT JUST DIDN'T LAST AND YOU DIDN'T HAVE MONEY TO GET MORE.: PATIENT UNABLE TO ANSWER

## 2025-05-15 SDOH — SOCIAL STABILITY: SOCIAL INSECURITY
WITHIN THE LAST YEAR, HAVE YOU BEEN RAPED OR FORCED TO HAVE ANY KIND OF SEXUAL ACTIVITY BY YOUR PARTNER OR EX-PARTNER?: PATIENT UNABLE TO ANSWER

## 2025-05-15 SDOH — ECONOMIC STABILITY: FOOD INSECURITY
HOW HARD IS IT FOR YOU TO PAY FOR THE VERY BASICS LIKE FOOD, HOUSING, MEDICAL CARE, AND HEATING?: PATIENT UNABLE TO ANSWER

## 2025-05-15 SDOH — ECONOMIC STABILITY: HOUSING INSECURITY: IN THE PAST 12 MONTHS, HOW MANY TIMES HAVE YOU MOVED WHERE YOU WERE LIVING?: 2

## 2025-05-15 SDOH — SOCIAL STABILITY: SOCIAL INSECURITY
WITHIN THE LAST YEAR, HAVE YOU BEEN KICKED, HIT, SLAPPED, OR OTHERWISE PHYSICALLY HURT BY YOUR PARTNER OR EX-PARTNER?: PATIENT UNABLE TO ANSWER

## 2025-05-15 SDOH — SOCIAL STABILITY: SOCIAL INSECURITY: ARE THERE ANY APPARENT SIGNS OF INJURIES/BEHAVIORS THAT COULD BE RELATED TO ABUSE/NEGLECT?: UNABLE TO ASSESS

## 2025-05-15 SDOH — SOCIAL STABILITY: SOCIAL INSECURITY: DO YOU FEEL ANYONE HAS EXPLOITED OR TAKEN ADVANTAGE OF YOU FINANCIALLY OR OF YOUR PERSONAL PROPERTY?: UNABLE TO ASSESS

## 2025-05-15 SDOH — SOCIAL STABILITY: SOCIAL INSECURITY: HAS ANYONE EVER THREATENED TO HURT YOUR FAMILY OR YOUR PETS?: UNABLE TO ASSESS

## 2025-05-15 SDOH — SOCIAL STABILITY: SOCIAL INSECURITY
WITHIN THE LAST YEAR, HAVE YOU BEEN HUMILIATED OR EMOTIONALLY ABUSED IN OTHER WAYS BY YOUR PARTNER OR EX-PARTNER?: PATIENT UNABLE TO ANSWER

## 2025-05-15 SDOH — ECONOMIC STABILITY: INCOME INSECURITY: IN THE PAST 12 MONTHS HAS THE ELECTRIC, GAS, OIL, OR WATER COMPANY THREATENED TO SHUT OFF SERVICES IN YOUR HOME?: NO

## 2025-05-15 SDOH — ECONOMIC STABILITY: FOOD INSECURITY
WITHIN THE PAST 12 MONTHS, YOU WORRIED THAT YOUR FOOD WOULD RUN OUT BEFORE YOU GOT THE MONEY TO BUY MORE.: PATIENT UNABLE TO ANSWER

## 2025-05-15 SDOH — HEALTH STABILITY: MENTAL HEALTH: BEHAVIORS/MOOD: COOPERATIVE;AGITATED

## 2025-05-15 SDOH — HEALTH STABILITY: MENTAL HEALTH: HOW OFTEN DO YOU HAVE A DRINK CONTAINING ALCOHOL?: NEVER

## 2025-05-15 SDOH — SOCIAL STABILITY: SOCIAL INSECURITY: DOES ANYONE TRY TO KEEP YOU FROM HAVING/CONTACTING OTHER FRIENDS OR DOING THINGS OUTSIDE YOUR HOME?: UNABLE TO ASSESS

## 2025-05-15 SDOH — SOCIAL STABILITY: SOCIAL INSECURITY: ARE YOU OR HAVE YOU BEEN THREATENED OR ABUSED PHYSICALLY, EMOTIONALLY, OR SEXUALLY BY ANYONE?: UNABLE TO ASSESS

## 2025-05-15 SDOH — SOCIAL STABILITY: SOCIAL INSECURITY: ABUSE: ADULT

## 2025-05-15 SDOH — SOCIAL STABILITY: SOCIAL INSECURITY: DO YOU FEEL UNSAFE GOING BACK TO THE PLACE WHERE YOU ARE LIVING?: UNABLE TO ASSESS

## 2025-05-15 SDOH — SOCIAL STABILITY: SOCIAL INSECURITY: WITHIN THE LAST YEAR, HAVE YOU BEEN AFRAID OF YOUR PARTNER OR EX-PARTNER?: PATIENT UNABLE TO ANSWER

## 2025-05-15 SDOH — ECONOMIC STABILITY: HOUSING INSECURITY
IN THE LAST 12 MONTHS, WAS THERE A TIME WHEN YOU WERE NOT ABLE TO PAY THE MORTGAGE OR RENT ON TIME?: PATIENT UNABLE TO ANSWER

## 2025-05-15 SDOH — SOCIAL STABILITY: SOCIAL INSECURITY: WERE YOU ABLE TO COMPLETE ALL THE BEHAVIORAL HEALTH SCREENINGS?: YES

## 2025-05-15 SDOH — ECONOMIC STABILITY: TRANSPORTATION INSECURITY
IN THE PAST 12 MONTHS, HAS LACK OF TRANSPORTATION KEPT YOU FROM MEDICAL APPOINTMENTS OR FROM GETTING MEDICATIONS?: PATIENT UNABLE TO ANSWER

## 2025-05-15 SDOH — HEALTH STABILITY: MENTAL HEALTH: BEHAVIORAL HEALTH(WDL): EXCEPTIONS TO WDL

## 2025-05-15 ASSESSMENT — PAIN SCALES - PAIN ASSESSMENT IN ADVANCED DEMENTIA (PAINAD)
BREATHING: NORMAL
BODYLANGUAGE: RELAXED
CONSOLABILITY: NO NEED TO CONSOLE
TOTALSCORE: 0
FACIALEXPRESSION: SMILING OR INEXPRESSIVE

## 2025-05-15 ASSESSMENT — ACTIVITIES OF DAILY LIVING (ADL)
FEEDING YOURSELF: DEPENDENT
ADEQUATE_TO_COMPLETE_ADL: NO
LACK_OF_TRANSPORTATION: PATIENT UNABLE TO ANSWER
LACK_OF_TRANSPORTATION: PATIENT UNABLE TO ANSWER
GROOMING: DEPENDENT
HEARING - LEFT EAR: DIFFICULTY WITH NOISE
TOILETING: DEPENDENT
WALKS IN HOME: DEPENDENT
LACK_OF_TRANSPORTATION: PATIENT UNABLE TO ANSWER
BATHING: DEPENDENT
HEARING - RIGHT EAR: DIFFICULTY WITH NOISE
PATIENT'S MEMORY ADEQUATE TO SAFELY COMPLETE DAILY ACTIVITIES?: NO
ASSISTIVE_DEVICE: WHEELCHAIR
JUDGMENT_ADEQUATE_SAFELY_COMPLETE_DAILY_ACTIVITIES: NO
DRESSING YOURSELF: DEPENDENT

## 2025-05-15 ASSESSMENT — LIFESTYLE VARIABLES
SKIP TO QUESTIONS 9-10: 1
HOW OFTEN DO YOU HAVE A DRINK CONTAINING ALCOHOL: NEVER
SKIP TO QUESTIONS 9-10: 1
AUDIT-C TOTAL SCORE: 0
AUDIT-C TOTAL SCORE: 0
HOW MANY STANDARD DRINKS CONTAINING ALCOHOL DO YOU HAVE ON A TYPICAL DAY: PATIENT DOES NOT DRINK
AUDIT-C TOTAL SCORE: 0
HOW MANY STANDARD DRINKS CONTAINING ALCOHOL DO YOU HAVE ON A TYPICAL DAY: PATIENT DOES NOT DRINK
HOW OFTEN DO YOU HAVE 6 OR MORE DRINKS ON ONE OCCASION: NEVER
AUDIT-C TOTAL SCORE: 0

## 2025-05-15 ASSESSMENT — COGNITIVE AND FUNCTIONAL STATUS - GENERAL
PERSONAL GROOMING: A LOT
DAILY ACTIVITIY SCORE: 9
MOBILITY SCORE: 10
EATING MEALS: A LOT
STANDING UP FROM CHAIR USING ARMS: A LOT
PATIENT BASELINE BEDBOUND: YES
TOILETING: TOTAL
MOVING FROM LYING ON BACK TO SITTING ON SIDE OF FLAT BED WITH BEDRAILS: A LOT
HELP NEEDED FOR BATHING: TOTAL
TURNING FROM BACK TO SIDE WHILE IN FLAT BAD: A LOT
CLIMB 3 TO 5 STEPS WITH RAILING: TOTAL
MOVING TO AND FROM BED TO CHAIR: A LOT
DRESSING REGULAR LOWER BODY CLOTHING: TOTAL
DRESSING REGULAR UPPER BODY CLOTHING: A LOT
WALKING IN HOSPITAL ROOM: TOTAL

## 2025-05-15 ASSESSMENT — PAIN - FUNCTIONAL ASSESSMENT
PAIN_FUNCTIONAL_ASSESSMENT: PAINAD (PAIN ASSESSMENT IN ADVANCED DEMENTIA SCALE)
PAIN_FUNCTIONAL_ASSESSMENT: 0-10

## 2025-05-15 ASSESSMENT — PAIN SCALES - GENERAL: PAINLEVEL_OUTOF10: 0 - NO PAIN

## 2025-05-15 NOTE — ED TRIAGE NOTES
Pt was sent in from [place] for c/o aggressive behavior,, per EMS pt brother arrived on scene and stated that pt has had this behavior in the past when he has a UTI

## 2025-05-15 NOTE — ED PROVIDER NOTES
Department of Emergency Medicine   ED  Provider Note  Admit Date/RoomTime: 5/15/2025  2:06 PM  ED Room: 446/446-A        History of Present Illness:  Chief Complaint   Patient presents with    Aggressive Behavior         Jabari Dumas is a 84 y.o. male history of chronic kidney disease, hyperlipidemia, dementia with behavioral disturbances, COPD, anemia, anxiety sent from [place] for complaints of aggressive behavior  Family present had advised that he acts like this when he has a UTI  Per the report patient recently had replacement was admitted at [facility] facility and then changed to [facility] facility he has been here about 1 month he got a call yesterday from  recommending hospice for his brother.  Family states over the past few months he has seen a decline but he does not feel he is ready for hospice due to eating wheat pizza and finger foods.  Family states they are waiting to get him into [facility].  He does have a wound to his left upper leg that he goes to wound care weekly at [facility].  Reports that it has not been changed and is requesting wound be cared in the emergency department.  Family also states the only time they have seen him agitated is when he has a UTI.  Reported the patient has fallen several times out of the chair at the nursing [facility] since he has been there for 1 month.  He is unaware of any fever chills no cough congestion runny nose sore throat no abdominal pain no nausea no vomiting reports that his skin is raw on his buttocks for wearing a diaper and also concerned he may have a UTI irritating the skin.  When he arrived after he was called by the [facility] facility they had police officers and fire trucks there it was unclear why they were called.  The patient was not aggressive or hitting.  Family requesting if patient is to be admitted would like Dr. Parmer notified for the admission he is familiar with the patient  Review of Systems:   Pertinent  positives and negatives are stated within HPI, all other systems reviewed and are negative.        --------------------------------------------- PAST HISTORY ---------------------------------------------  Past Medical History:  has a past medical history of Gastrointestinal hemorrhage (03/08/2025).  Past Surgical History:  has a past surgical history that includes CT guided imaging for needle placement (12/7/2017).  Social History:  reports that he has quit smoking. His smoking use included cigarettes. He does not have any smokeless tobacco history on file. He reports that he does not currently use alcohol.  Family History: family history is not on file.. Unless otherwise noted, family history is non contributory  The patient’s home medications have been reviewed.  Allergies: Patient has no known allergies.        ---------------------------------------------------PHYSICAL EXAM--------------------------------------    GENERAL APPEARANCE: Awake confused  VITAL SIGNS: As per the nurses' triage record.   HEENT: Normocephalic, atraumatic.  No raccoon eyes or nixon signs noted no epistaxis noted no bite to the tongue or lip.  Extraocular muscles are intact. Pupils equal round and reactive to light. Conjunctiva are pink. Negative scleral icterus. Mucous membranes are dry . Tongue in the midline. Pharynx was without erythema or exudates, uvula midline  NECK: Soft Nontender and supple, full gross ROM, no meningeal signs.  CHEST: Nontender to palpation.  Diminished Clear to auscultation bilaterally. No rales, rhonchi, or wheezing.   HEART: S1, S2. Regular rate and rhythm. No murmurs, gallops or rubs.  Strong and equal pulses in the extremities.   ABDOMEN: Soft, nontender, nondistended, positive bowel sounds, no palpable masses.  MUSCULCSKELETAL: Wound to the left upper leg anterior thigh no redness or warmth no lymphangitic streaking nontender no pustules purpura petechiae peripheral pulses intact  NEUROLOGICAL: Awake,  "alert confused .  Moving extremities at baseline extremities. Sensation is intact to light touch in the upper and lower extremities.   IMMUNOLOGICAL: No lymphatic streaking noted   DERM: No petechiae, or ecchymoses.  Back: No pain palpation of thoracic or lumbar spine no step-offs crepitus or bruising          ------------------------- NURSING NOTES AND VITALS REVIEWED ---------------------------  The nursing notes within the ED encounter and vital signs as below have been reviewed by myself  /84 (BP Location: Right arm, Patient Position: Lying)   Pulse 84   Temp 36 °C (96.8 °F) (Temporal)   Resp 19   Ht 1.803 m (5' 11\")   Wt 69.2 kg (152 lb 8.9 oz)   SpO2 95%   BMI 21.28 kg/m²     Oxygen Saturation Interpretation: 97% room air        The patient’s available past medical records and past encounters were reviewed.          -----------------------DIAGNOSTIC RESULTS------------------------  LABS:    Labs Reviewed   CBC WITH AUTO DIFFERENTIAL - Abnormal       Result Value    WBC 6.4      nRBC 0.0      RBC 4.44 (*)     Hemoglobin 12.4 (*)     Hematocrit 39.2 (*)     MCV 88      MCH 27.9      MCHC 31.6 (*)     RDW 15.6 (*)     Platelets 286      Neutrophils % 72.0      Immature Granulocytes %, Automated 0.3      Lymphocytes % 14.2      Monocytes % 12.3      Eosinophils % 0.9      Basophils % 0.3      Neutrophils Absolute 4.58      Immature Granulocytes Absolute, Automated 0.02      Lymphocytes Absolute 0.90      Monocytes Absolute 0.78      Eosinophils Absolute 0.06      Basophils Absolute 0.02     COMPREHENSIVE METABOLIC PANEL - Abnormal    Glucose 128 (*)     Sodium 142      Potassium 3.8      Chloride 106      Bicarbonate 26      Anion Gap 14      Urea Nitrogen 19      Creatinine 1.65 (*)     eGFR 40 (*)     Calcium 9.5      Albumin 3.4      Alkaline Phosphatase 118      Total Protein 6.8      AST 20      Bilirubin, Total 0.6      ALT 15     URINALYSIS WITH REFLEX CULTURE AND MICROSCOPIC - Abnormal    " Color, Urine Yellow      Appearance, Urine Turbid (*)     Specific Gravity, Urine 1.020      pH, Urine 7.0      Protein, Urine 30 (1+) (*)     Glucose, Urine Normal      Blood, Urine 0.03 (TRACE) (*)     Ketones, Urine TRACE (*)     Bilirubin, Urine NEGATIVE      Urobilinogen, Urine 2 (1+) (*)     Nitrite, Urine NEGATIVE      Leukocyte Esterase, Urine 500 Randolph/uL (*)    MICROSCOPIC ONLY, URINE - Abnormal    WBC, Urine >50 (*)     RBC, Urine 3-5      Squamous Epithelial Cells, Urine 1-9 (SPARSE)      Hyaline Casts, Urine 3+ (*)    LACTATE - Normal    Lactate 1.5      Narrative:     Venipuncture immediately after or during the administration of Metamizole may lead to falsely low results. Testing should be performed immediately prior to Metamizole dosing.   URINE CULTURE   BLOOD CULTURE   BLOOD CULTURE   URINALYSIS WITH REFLEX CULTURE AND MICROSCOPIC    Narrative:     The following orders were created for panel order Urinalysis with Reflex Culture and Microscopic.  Procedure                               Abnormality         Status                     ---------                               -----------         ------                     Urinalysis with Reflex C...[[id number]]  Abnormal            Final result               Extra Urine Gray Tube[[id number]]                                                         Please view results for these tests on the individual orders.   EXTRA URINE GRAY TUBE       As interpreted by me, the above displayed labs are abnormal. All other labs obtained during this visit were within normal range or not returned as of this dictation.      EKG Interpretation      EKG with atrial fibrillation at 87 bpm, right bundle branch block pattern, normal axis, normal voltage, normal ST segment, normal T waves per attending note        CT chest abdomen pelvis w IV contrast   Final Result   1. No acute traumatic abnormality identified in the chest, abdomen,   or pelvis.   2. Dependent patchy and  consolidative opacities in the bilateral lung   bases concerning for infectious or inflammatory process, including   aspiration. There is also likely a component of volume loss.   3. Enlarged heterogeneous appearance of the left lobe of the thyroid   in partially visualized enlarged left cervical lymph node. Follow-up   ultrasound recommended for further assessment.   4. Nodular contour of the liver, suggestive of cirrhosis. Correlate   with hepatic function tests.   5. Prominent soft tissue defect overlying the left inguinal   region/upper thigh with surrounding fat stranding and adjacent air   locules. Correlate for recent intervention or evidence of infection.        MACRO:   None        Signed by: Jayant Quintero 5/15/2025 8:55 PM   Dictation workstation:   [id number]      CT head wo IV contrast   Final Result   CT HEAD:   1. Assessment limited by motion.   2. No definite acute intracranial abnormality within this limitation.             CT CERVICAL SPINE:   1. No acute fracture or traumatic malalignment of the cervical spine.   2. Moderate-to-severe degenerative changes of the cervical spine.   3. Enlarged heterogeneous appearance of the left lobe of the thyroid.   Follow-up ultrasound should be considered for further assessment.   4. Enlarged lymph node in the upper left jugular chain measuring up   to 3 cm. Appearance is concerning for malignant/metastatic process.        MACRO:   None        Signed by: Jayant Quintero 5/15/2025 8:41 PM   Dictation workstation:   [id number]      CT cervical spine wo IV contrast   Final Result   CT HEAD:   1. Assessment limited by motion.   2. No definite acute intracranial abnormality within this limitation.             CT CERVICAL SPINE:   1. No acute fracture or traumatic malalignment of the cervical spine.   2. Moderate-to-severe degenerative changes of the cervical spine.   3. Enlarged heterogeneous appearance of the left lobe of the thyroid.   Follow-up ultrasound should  be considered for further assessment.   4. Enlarged lymph node in the upper left jugular chain measuring up   to 3 cm. Appearance is concerning for malignant/metastatic process.        MACRO:   None        Signed by: Jayant Quintero 5/15/2025 8:41 PM   Dictation workstation:   [id number]              CT chest abdomen pelvis w IV contrast   Final Result   1. No acute traumatic abnormality identified in the chest, abdomen,   or pelvis.   2. Dependent patchy and consolidative opacities in the bilateral lung   bases concerning for infectious or inflammatory process, including   aspiration. There is also likely a component of volume loss.   3. Enlarged heterogeneous appearance of the left lobe of the thyroid   in partially visualized enlarged left cervical lymph node. Follow-up   ultrasound recommended for further assessment.   4. Nodular contour of the liver, suggestive of cirrhosis. Correlate   with hepatic function tests.   5. Prominent soft tissue defect overlying the left inguinal   region/upper thigh with surrounding fat stranding and adjacent air   locules. Correlate for recent intervention or evidence of infection.        MACRO:   None        Signed by: Jayant Quintero 5/15/2025 8:55 PM   Dictation workstation:   [id number]      CT head wo IV contrast   Final Result   CT HEAD:   1. Assessment limited by motion.   2. No definite acute intracranial abnormality within this limitation.             CT CERVICAL SPINE:   1. No acute fracture or traumatic malalignment of the cervical spine.   2. Moderate-to-severe degenerative changes of the cervical spine.   3. Enlarged heterogeneous appearance of the left lobe of the thyroid.   Follow-up ultrasound should be considered for further assessment.   4. Enlarged lymph node in the upper left jugular chain measuring up   to 3 cm. Appearance is concerning for malignant/metastatic process.        MACRO:   None        Signed by: Jayant Quintero 5/15/2025 8:41 PM   Dictation  workstation:   [id number]      CT cervical spine wo IV contrast   Final Result   CT HEAD:   1. Assessment limited by motion.   2. No definite acute intracranial abnormality within this limitation.             CT CERVICAL SPINE:   1. No acute fracture or traumatic malalignment of the cervical spine.   2. Moderate-to-severe degenerative changes of the cervical spine.   3. Enlarged heterogeneous appearance of the left lobe of the thyroid.   Follow-up ultrasound should be considered for further assessment.   4. Enlarged lymph node in the upper left jugular chain measuring up   to 3 cm. Appearance is concerning for malignant/metastatic process.        MACRO:   None        Signed by: Jayant Quintero 5/15/2025 8:41 PM   Dictation workstation:   [id number]              ------------------------------ ED COURSE/MEDICAL DECISION MAKING----------------------  Medical Decision Making:   Exam: A medically appropriate exam performed, outlined above, given the known history and presentation.    History obtained from: Review of medical record nursing notes patient's EMS report      Social Determinants of Health considered during this visit: From [place]      PAST MEDICAL HISTORY/Chronic Conditions Affecting Care     has a past medical history of Gastrointestinal hemorrhage (03/08/2025).       CC/HPI Summary, Social Determinants of health, Records Reviewed, DDx, testing done/not done, ED Course, Reassessment, disposition considerations/shared decision making with patient, consults, disposition:   Presents with aggressive behavior  CBC, CMP, urine normal saline, regular diet, CT chest abdomen pelvis frequent falls, CT head, CT cervical, urine    Medical Decision Making/Differential Diagnosis:  Differentials include not limited to infectious process UTI versus electrolyte abnormality versus dehydration versus worsening dementia versus an cranial bleed versus close head injury  Review  Urine consistent with UTI leukoesterase trace  ketones blood WBCs greater than 50 culture pending  White blood cell count 6.8  Hemoglobin 12.4  Lactate 1.5  Electrolytes unremarkable  BUN within normal limits  Creatinine elevated from baseline  LFTs unremarkable  Patient has history of dementia EMS was called due to agitation patient would not take his medications.  Brother had reported when he gets agitated usually has a UTI.  Urine today is consistent with UTI culture pending started on Rocephin.  Does not appear to be toxic or septic he is not hypotensive tachycardic or febrile.  No elevation white blood cell count lactate normal.  Hemoglobin 12.4 no signs of active bleeding electrolytes unremarkable he is not hypoglycemic with a glucose of 128.  Creatinine elevated from baseline consistent with acute kidney injury patient received IV fluids LFTs unremarkable.  Brother had also reported patient has had multiple falls CT of the head and neck showedCT HEAD: 1. Assessment limited by motion.  2. No definite acute intracranial abnormality within this limitation. CT CERVICAL SPINE:  1. No acute fracture or traumatic malalignment of the cervical spine.  2. Moderate-to-severe degenerative changes of the cervical spine.  3. Enlarged heterogeneous appearance of the left lobe of the thyroid.  4. Enlarged lymph node in the upper left jugular chain measuring up  to 3 cm. Appearance is concerning for malignant/metastatic process  CT of the chest abdomen pelvis showed 1. No acute traumatic abnormality identified in the chest, abdomen,  or pelvis.  2. Dependent patchy and consolidative opacities in the bilateral lung  bases concerning for infectious or inflammatory process, including  aspiration. There is also likely a component of volume loss.  3. Enlarged heterogeneous appearance of the left lobe of the thyroid  in partially visualized enlarged left cervical lymph node. Follow-up  ultrasound recommended for further assessment.  4. Nodular contour of the liver, suggestive  of cirrhosis. Correlate  with hepatic function tests.  5. Prominent soft tissue defect overlying the left inguinal  region/upper thigh with surrounding fat stranding and adjacent air  locules. Correlate for recent intervention or evidence of infection.  No obvious fracture noted.  Consolidation opaque disease in bilateral lungs concerning for pneumonia possibly aspiration brother reports that patient does have tendency to get pneumonia blood cultures ordered Zithromax added to patient's plan of care.  Incidental findings also noted.  Patient has an open wound to the left hip wound care performed per the recommendations of the wound care unit family reported has not been changed yet today and requesting dressing be changed.  Based on patient's clinical presentation history and symptoms consistent with  Altered mental status history of dementia  UTI Rocephin with hematuria  Pneumonia bilateral lungs cultures pending Zithromax Rocephin  Open wound of the hip chronic wound care performed  Acute kidney injury received IV fluids  Frequent falls discussed case with patient's primary care team who agreed to admit for further evaluation and treatment  Family amenable plan patient did require medication couple of times due to agitation with request of the family as well.  Patient tolerated medication well and agitation improved  Patient seen evaluated with attending physician Dr. Rodríguez no signs of sepsis or toxicity he is not hypotensive tachycardic or febrile  PROCEDURES  Unless otherwise noted below, none      CONSULTS:   None      ED Course as of 05/15/25 2226   Thu May 15, 2025   1549 Reviewed wound care recommendations with pharmacist 0.5 x 5 cm iodoform packing followed by [facility] [TB]   1806 Patient restless and just received the Ativan [TB]   1806 Leukocyte Esterase, Urine(!): 500 Randolph/uL  Rocephin ordered culture pending [TB]   2108 CT concerning for pneumonia.  Added Zithromax added blood cultures.  Lactate. [TB]    2115 Discussed case with patient's primary care team is agreed to admit regular nursing floor telemetry [TB]      ED Course User Index  [TB] SARI Washington-CNP         Diagnoses as of 05/15/25 2226   Acute kidney injury   Altered mental status, unspecified altered mental status type   Acute cystitis with hematuria   Pneumonia of both lungs due to infectious organism, unspecified part of lung   Open wound of left hip, initial encounter   Abnormal CT scan   Frequent falls         This patient has remained hemodynamically stable during their ED course.      Critical Care: none        Counseling:  The emergency provider has spoken with the patient's family and discussed today’s results, in addition to providing specific details for the plan of care and counseling regarding the diagnosis and prognosis.  Questions are answered at this time and they are agreeable with the plan.         --------------------------------- IMPRESSION AND DISPOSITION ---------------------------------    IMPRESSION  1. Altered mental status, unspecified altered mental status type    2. Acute kidney injury    3. Acute cystitis with hematuria    4. Pneumonia of both lungs due to infectious organism, unspecified part of lung    5. Open wound of left hip, initial encounter    6. Abnormal CT scan    7. Frequent falls        DISPOSITION  Disposition: Admit primary care service  Patient condition is stable        NOTE: This report was transcribed using voice recognition software. Every effort was made to ensure accuracy; however, inadvertent computerized transcription errors may be present      MORIAH Washington  05/15/25 2226       MORIAH Washington  05/15/25 2229

## 2025-05-15 NOTE — PROGRESS NOTES
Pharmacy Medication History Review    Jabari Dumas is a 84 y.o. male admitted for altered mental status, acute kidney injury. Pharmacy reviewed the patient's [id number] medications and allergies for accuracy.    Medications ADDED:  N/A  Medications CHANGED:  Furosemide 40 mg, 1/2 tablet once daily -> 20 mg, 1 tablet once daily  Medications REMOVED:   Heparin 5000 units/mL injection solution (not receiving at SNF per medication list)  Zinc oxide ointment (no dispensation history per chart review)    The list below reflects the updated PTA list.   Prior to Admission Medications   Prescriptions Last Dose Informant Patient Reported? Taking?   FLUoxetine (PROzac) 10 mg capsule Past Week  Yes Yes   Sig: Take 1 capsule (10 mg) by mouth once daily.   atorvastatin (Lipitor) 40 mg tablet Past Week  Yes Yes   Sig: Take 1 tablet (40 mg) by mouth once daily at bedtime.   doxycycline (Vibramycin) 100 mg capsule Unknown  No No   Sig: Take 1 capsule (100 mg) by mouth 2 times a day for 10 days. Take with at least 8 ounces (large glass) of water, do not lie down for 30 minutes after   finasteride (Proscar) 5 mg tablet Past Week  Yes Yes   Sig: Take 1 tablet (5 mg) by mouth once daily.   furosemide (Lasix) 20 mg tablet Past Week  Yes Yes   Sig: Take 1 tablet (20 mg) by mouth once daily.   furosemide (Lasix) 40 mg tablet Not Taking  No No   Sig: Take 0.5 tablets (20 mg) by mouth once daily.   Patient not taking: Reported on 5/15/2025   heparin sodium,porcine (heparin, porcine,) 5,000 unit/mL injection Unknown  No No   Sig: Inject 1 mL (5,000 Units) under the skin every 8 hours. For one month until mobile or cleared by orthopedic surgery   oxyCODONE (Roxicodone) 5 mg immediate release tablet Past Week  Yes Yes   Sig: Take 1 tablet (5 mg) by mouth every 6 hours if needed for severe pain (7 - 10).   pantoprazole (ProtoNix) 40 mg EC tablet Past Week  No Yes   Sig: Take 1 tablet (40 mg) by mouth once daily in the morning. Take  before meals. Do not crush, chew, or split.   potassium chloride CR 10 mEq ER tablet Past Week  Yes Yes   Sig: Take 1 tablet (10 mEq) by mouth once daily.   sennosides-docusate sodium (Sarah-Colace) 8.6-50 mg tablet Past Week  No Yes   Sig: Take 2 tablets by mouth once daily at bedtime.   tamsulosin (Flomax) 0.4 mg 24 hr capsule Past Week  Yes Yes   Sig: Take 1 capsule (0.4 mg) by mouth once daily.   traZODone (Desyrel) 50 mg tablet Past Week  Yes Yes   Sig: Take 1 tablet (50 mg) by mouth once daily at bedtime.   zinc oxide 40 % ointment ointment Unknown  No No   Sig: Apply 1 Application topically 2 times a day. Apply to groin and scrotum      Facility-Administered Medications: None        The list below reflects the updated allergy list. Please review each documented allergy for additional clarification and justification.  Allergies  Reviewed by Heidi Hallman RN on 5/15/2025   No Known Allergies         Patient was unable to be assessed for M2B at discharge.     Sources:   Pharmacy dispense history  Chart Review  [facility] [place] medication list     Additional Comments:  Recently prescribed an 11-day course of doxycycline 100 mg, 1 PO BID, starting 5/7/25 for ? Impaired wound healing. Appears this was being given at facility, last dose unknown at this time.    Recent dispensation 5/7/25 for omeprazole 20 mg once-daily x 28 days. Appears to be receiving pantoprazole at facility as documented above, no mention of omeprazole on SNF med list.      Mack Tang [facility]  Clinical Pharmacy Specialist  05/15/25

## 2025-05-15 NOTE — PROGRESS NOTES
Attestation/Supervisory note for ROXANE Anthony      The patient is an 84-year-old male presenting to the emergency department for evaluation of aggressive behavior and worsening dementia with intermittent agitation.  The patient reportedly lives at a long-term care facility because of his symptoms.  His brother states that he does try to provide care for him but he cannot take care of him at home because of his symptoms.  He states that sometimes he has behavioral changes when he has a urinary tract infection.  The patient denies having any symptoms but he has limited ability to provide any details regarding HPI or review of systems.  His brother states that he has seen a decline in his brother's mental status over the past several months.  He states that it was recommended that he go to hospice but he is trying to delay that as for as long as possible.  He states he does have a chronic wound on the left upper leg and is requesting that we do some bandage changes while he is in the emergency room because he does not know when the last time the staff at the long-term care facility have changed.  He normally does go to wound care weekly in [facility].  The patient's brother also reports that he has had several falls out of a chair over the past month.  He states that he did go to the nursing home today to evaluate his brother after he was told that he was agitated but he did not notice any behavioral changes in his brother.  All pertinent positives and negatives are recorded above.  All other systems reviewed and otherwise negative.  Vital signs within normal limits.  Physical exam with a well-nourished well-developed male with some intermittent agitation but no combativeness.  He has no evidence of airway compromise or respiratory distress.  Mucous membranes are dry.  Abdominal exam is benign.  He moves all 4 extremities spontaneously.  He does not cooperate with neurologic testing.      EKG with atrial fibrillation at 87  bpm, right bundle branch block pattern, normal axis, normal voltage, normal ST segment, normal T waves      Diagnostic labs with mild anemia, mild renal insufficiency and evidence of urinary tract infection      IV Rocephin ordered      CT head wo IV contrast    (Results Pending)   CT chest abdomen pelvis w IV contrast    (Results Pending)   CT cervical spine wo IV contrast    (Results Pending)        The patient does not have any evidence of airway compromise or respiratory distress on exam.  He is well-perfused on reperfusion exam.  No evidence of sepsis.  Diagnostic labs with mild anemia mild renal insufficiency and evidence of a urinary tract infection.  IV Rocephin was ordered.  Results of the diagnostic imaging were pending at the time of my departure.      ROXANE Cruz will continue to manage the patient primarily.  Anticipate disposition based on the results of the diagnostic imaging.      Impression/diagnosis:  1.  Dementia with agitation  2.  Acute lower urinary tract infection  3. Anemia, unspecified  4.       I personally saw the patient and made/approve the management plan and take responsibility for the patient management.      I independently interpreted the following study (S) EKG and diagnostic labs      I personally discussed the patient's management with the pt and his brother      I reviewed the results of the diagnostic labs and diagnostic imaging.  Formal radiology read was completed by the radiologist.      Sienna Rodríguez MD

## 2025-05-16 LAB
ALBUMIN SERPL BCP-MCNC: 2.8 G/DL (ref 3.4–5)
ALP SERPL-CCNC: 93 U/L (ref 33–136)
ALT SERPL W P-5'-P-CCNC: 9 U/L (ref 10–52)
ANION GAP SERPL CALCULATED.3IONS-SCNC: 11 MMOL/L (ref 10–20)
APPEARANCE UR: ABNORMAL
AST SERPL W P-5'-P-CCNC: 13 U/L (ref 9–39)
BILIRUB SERPL-MCNC: 0.6 MG/DL (ref 0–1.2)
BILIRUB UR STRIP.AUTO-MCNC: NEGATIVE MG/DL
BUN SERPL-MCNC: 16 MG/DL (ref 6–23)
CALCIUM SERPL-MCNC: 8.6 MG/DL (ref 8.6–10.3)
CHLORIDE SERPL-SCNC: 111 MMOL/L (ref 98–107)
CO2 SERPL-SCNC: 24 MMOL/L (ref 21–32)
COLOR UR: YELLOW
CREAT SERPL-MCNC: 1.38 MG/DL (ref 0.5–1.3)
EGFRCR SERPLBLD CKD-EPI 2021: 50 ML/MIN/1.73M*2
ERYTHROCYTE [DISTWIDTH] IN BLOOD BY AUTOMATED COUNT: 15.9 % (ref 11.5–14.5)
GLUCOSE SERPL-MCNC: 90 MG/DL (ref 74–99)
GLUCOSE UR STRIP.AUTO-MCNC: NEGATIVE MG/DL
HCT VFR BLD AUTO: 34.7 % (ref 41–52)
HGB BLD-MCNC: 10.7 G/DL (ref 13.5–17.5)
KETONES UR STRIP.AUTO-MCNC: ABNORMAL MG/DL
LEUKOCYTE ESTERASE UR QL STRIP.AUTO: ABNORMAL
MCH RBC QN AUTO: 28.2 PG (ref 26–34)
MCHC RBC AUTO-ENTMCNC: 30.8 G/DL (ref 32–36)
MCV RBC AUTO: 92 FL (ref 80–100)
NITRITE UR QL STRIP.AUTO: NEGATIVE
NRBC BLD-RTO: 0 /100 WBCS (ref 0–0)
PH UR STRIP.AUTO: 6.5 [PH]
PLATELET # BLD AUTO: 216 X10*3/UL (ref 150–450)
POTASSIUM SERPL-SCNC: 3.4 MMOL/L (ref 3.5–5.3)
PROT SERPL-MCNC: 5.3 G/DL (ref 6.4–8.2)
PROT UR STRIP.AUTO-MCNC: ABNORMAL MG/DL
RBC # BLD AUTO: 3.79 X10*6/UL (ref 4.5–5.9)
RBC # UR STRIP.AUTO: ABNORMAL MG/DL
RBC #/AREA URNS AUTO: ABNORMAL /HPF
SODIUM SERPL-SCNC: 143 MMOL/L (ref 136–145)
SP GR UR STRIP.AUTO: <=1.005
SQUAMOUS #/AREA URNS AUTO: ABNORMAL /HPF
UROBILINOGEN UR STRIP.AUTO-MCNC: 0.2 MG/DL
WBC # BLD AUTO: 5.4 X10*3/UL (ref 4.4–11.3)
WBC #/AREA URNS AUTO: >50 /HPF
WBC CLUMPS #/AREA URNS AUTO: ABNORMAL /HPF

## 2025-05-16 PROCEDURE — 2500000004 HC RX 250 GENERAL PHARMACY W/ HCPCS (ALT 636 FOR OP/ED): Performed by: INTERNAL MEDICINE

## 2025-05-16 PROCEDURE — 97165 OT EVAL LOW COMPLEX 30 MIN: CPT | Mod: GO

## 2025-05-16 PROCEDURE — 2500000002 HC RX 250 W HCPCS SELF ADMINISTERED DRUGS (ALT 637 FOR MEDICARE OP, ALT 636 FOR OP/ED): Performed by: INTERNAL MEDICINE

## 2025-05-16 PROCEDURE — 81003 URINALYSIS AUTO W/O SCOPE: CPT | Performed by: INTERNAL MEDICINE

## 2025-05-16 PROCEDURE — 87205 SMEAR GRAM STAIN: CPT | Mod: TRILAB | Performed by: INTERNAL MEDICINE

## 2025-05-16 PROCEDURE — 1100000001 HC PRIVATE ROOM DAILY

## 2025-05-16 PROCEDURE — 36415 COLL VENOUS BLD VENIPUNCTURE: CPT | Performed by: INTERNAL MEDICINE

## 2025-05-16 PROCEDURE — 80053 COMPREHEN METABOLIC PANEL: CPT | Performed by: INTERNAL MEDICINE

## 2025-05-16 PROCEDURE — 2500000005 HC RX 250 GENERAL PHARMACY W/O HCPCS: Performed by: INTERNAL MEDICINE

## 2025-05-16 PROCEDURE — 85027 COMPLETE CBC AUTOMATED: CPT | Performed by: INTERNAL MEDICINE

## 2025-05-16 PROCEDURE — 87075 CULTR BACTERIA EXCEPT BLOOD: CPT | Mod: TRILAB | Performed by: INTERNAL MEDICINE

## 2025-05-16 PROCEDURE — 2500000001 HC RX 250 WO HCPCS SELF ADMINISTERED DRUGS (ALT 637 FOR MEDICARE OP): Performed by: INTERNAL MEDICINE

## 2025-05-16 PROCEDURE — 97161 PT EVAL LOW COMPLEX 20 MIN: CPT | Mod: GP

## 2025-05-16 RX ORDER — IPRATROPIUM BROMIDE AND ALBUTEROL SULFATE 2.5; .5 MG/3ML; MG/3ML
3 SOLUTION RESPIRATORY (INHALATION) EVERY 4 HOURS PRN
Status: DISCONTINUED | OUTPATIENT
Start: 2025-05-16 | End: 2025-05-22 | Stop reason: HOSPADM

## 2025-05-16 RX ADMIN — TRAZODONE HYDROCHLORIDE 50 MG: 50 TABLET ORAL at 20:04

## 2025-05-16 RX ADMIN — Medication 1 APPLICATION: at 20:14

## 2025-05-16 RX ADMIN — Medication 1 APPLICATION: at 10:17

## 2025-05-16 RX ADMIN — OLANZAPINE 2.5 MG: 10 INJECTION, POWDER, FOR SOLUTION INTRAMUSCULAR at 19:36

## 2025-05-16 RX ADMIN — FUROSEMIDE 20 MG: 20 TABLET ORAL at 08:55

## 2025-05-16 RX ADMIN — FINASTERIDE 5 MG: 5 TABLET, FILM COATED ORAL at 08:55

## 2025-05-16 RX ADMIN — POTASSIUM CHLORIDE 10 MEQ: 750 TABLET, EXTENDED RELEASE ORAL at 08:55

## 2025-05-16 RX ADMIN — OLANZAPINE 2.5 MG: 10 INJECTION, POWDER, FOR SOLUTION INTRAMUSCULAR at 00:05

## 2025-05-16 RX ADMIN — PIPERACILLIN SODIUM AND TAZOBACTAM SODIUM 2.25 G: 2; .25 INJECTION, SOLUTION INTRAVENOUS at 12:24

## 2025-05-16 RX ADMIN — Medication 6 MG: at 22:15

## 2025-05-16 RX ADMIN — TAMSULOSIN HYDROCHLORIDE 0.4 MG: 0.4 CAPSULE ORAL at 08:54

## 2025-05-16 RX ADMIN — PIPERACILLIN SODIUM AND TAZOBACTAM SODIUM 2.25 G: 2; .25 INJECTION, SOLUTION INTRAVENOUS at 18:00

## 2025-05-16 RX ADMIN — FLUOXETINE HYDROCHLORIDE 10 MG: 10 CAPSULE ORAL at 10:12

## 2025-05-16 RX ADMIN — ATORVASTATIN CALCIUM 40 MG: 40 TABLET, FILM COATED ORAL at 21:00

## 2025-05-16 RX ADMIN — ATORVASTATIN CALCIUM 40 MG: 40 TABLET, FILM COATED ORAL at 00:31

## 2025-05-16 RX ADMIN — ACETAMINOPHEN 650 MG: 325 TABLET ORAL at 22:15

## 2025-05-16 RX ADMIN — Medication 6 MG: at 00:31

## 2025-05-16 RX ADMIN — HEPARIN SODIUM 5000 UNITS: 5000 INJECTION INTRAVENOUS; SUBCUTANEOUS at 00:31

## 2025-05-16 RX ADMIN — HEPARIN SODIUM 5000 UNITS: 5000 INJECTION INTRAVENOUS; SUBCUTANEOUS at 06:04

## 2025-05-16 RX ADMIN — PANTOPRAZOLE SODIUM 40 MG: 40 TABLET, DELAYED RELEASE ORAL at 06:04

## 2025-05-16 RX ADMIN — HEPARIN SODIUM 5000 UNITS: 5000 INJECTION INTRAVENOUS; SUBCUTANEOUS at 20:06

## 2025-05-16 RX ADMIN — TRAZODONE HYDROCHLORIDE 50 MG: 50 TABLET ORAL at 00:31

## 2025-05-16 RX ADMIN — PIPERACILLIN SODIUM AND TAZOBACTAM SODIUM 2.25 G: 2; .25 INJECTION, SOLUTION INTRAVENOUS at 06:10

## 2025-05-16 RX ADMIN — PIPERACILLIN SODIUM AND TAZOBACTAM SODIUM 2.25 G: 2; .25 INJECTION, SOLUTION INTRAVENOUS at 01:51

## 2025-05-16 RX ADMIN — HEPARIN SODIUM 5000 UNITS: 5000 INJECTION INTRAVENOUS; SUBCUTANEOUS at 13:04

## 2025-05-16 SDOH — SOCIAL STABILITY: SOCIAL INSECURITY: WITHIN THE LAST YEAR, HAVE YOU BEEN HUMILIATED OR EMOTIONALLY ABUSED IN OTHER WAYS BY YOUR PARTNER OR EX-PARTNER?: NO

## 2025-05-16 SDOH — ECONOMIC STABILITY: HOUSING INSECURITY: IN THE LAST 12 MONTHS, WAS THERE A TIME WHEN YOU WERE NOT ABLE TO PAY THE MORTGAGE OR RENT ON TIME?: NO

## 2025-05-16 SDOH — HEALTH STABILITY: PHYSICAL HEALTH
HOW OFTEN DO YOU NEED TO HAVE SOMEONE HELP YOU WHEN YOU READ INSTRUCTIONS, PAMPHLETS, OR OTHER WRITTEN MATERIAL FROM YOUR DOCTOR OR PHARMACY?: ALWAYS

## 2025-05-16 SDOH — ECONOMIC STABILITY: HOUSING INSECURITY: IN THE PAST 12 MONTHS, HOW MANY TIMES HAVE YOU MOVED WHERE YOU WERE LIVING?: 0

## 2025-05-16 SDOH — ECONOMIC STABILITY: INCOME INSECURITY: IN THE PAST 12 MONTHS HAS THE ELECTRIC, GAS, OIL, OR WATER COMPANY THREATENED TO SHUT OFF SERVICES IN YOUR HOME?: NO

## 2025-05-16 SDOH — SOCIAL STABILITY: SOCIAL INSECURITY: WITHIN THE LAST YEAR, HAVE YOU BEEN AFRAID OF YOUR PARTNER OR EX-PARTNER?: NO

## 2025-05-16 SDOH — ECONOMIC STABILITY: TRANSPORTATION INSECURITY: IN THE PAST 12 MONTHS, HAS LACK OF TRANSPORTATION KEPT YOU FROM MEDICAL APPOINTMENTS OR FROM GETTING MEDICATIONS?: NO

## 2025-05-16 SDOH — ECONOMIC STABILITY: FOOD INSECURITY: HOW HARD IS IT FOR YOU TO PAY FOR THE VERY BASICS LIKE FOOD, HOUSING, MEDICAL CARE, AND HEATING?: NOT HARD AT ALL

## 2025-05-16 SDOH — ECONOMIC STABILITY: HOUSING INSECURITY: AT ANY TIME IN THE PAST 12 MONTHS, WERE YOU HOMELESS OR LIVING IN A SHELTER (INCLUDING NOW)?: NO

## 2025-05-16 SDOH — ECONOMIC STABILITY: FOOD INSECURITY: WITHIN THE PAST 12 MONTHS, THE FOOD YOU BOUGHT JUST DIDN'T LAST AND YOU DIDN'T HAVE MONEY TO GET MORE.: NEVER TRUE

## 2025-05-16 SDOH — ECONOMIC STABILITY: FOOD INSECURITY: WITHIN THE PAST 12 MONTHS, YOU WORRIED THAT YOUR FOOD WOULD RUN OUT BEFORE YOU GOT THE MONEY TO BUY MORE.: NEVER TRUE

## 2025-05-16 ASSESSMENT — COGNITIVE AND FUNCTIONAL STATUS - GENERAL
TOILETING: TOTAL
TOILETING: TOTAL
MOVING TO AND FROM BED TO CHAIR: TOTAL
MOBILITY SCORE: 6
MOVING FROM LYING ON BACK TO SITTING ON SIDE OF FLAT BED WITH BEDRAILS: TOTAL
DAILY ACTIVITIY SCORE: 8
DAILY ACTIVITIY SCORE: 6
STANDING UP FROM CHAIR USING ARMS: TOTAL
HELP NEEDED FOR BATHING: TOTAL
HELP NEEDED FOR BATHING: TOTAL
DRESSING REGULAR UPPER BODY CLOTHING: TOTAL
CLIMB 3 TO 5 STEPS WITH RAILING: TOTAL
DRESSING REGULAR UPPER BODY CLOTHING: TOTAL
TURNING FROM BACK TO SIDE WHILE IN FLAT BAD: TOTAL
DRESSING REGULAR LOWER BODY CLOTHING: TOTAL
PERSONAL GROOMING: TOTAL
EATING MEALS: TOTAL
STANDING UP FROM CHAIR USING ARMS: TOTAL
DRESSING REGULAR UPPER BODY CLOTHING: TOTAL
WALKING IN HOSPITAL ROOM: TOTAL
EATING MEALS: A LOT
CLIMB 3 TO 5 STEPS WITH RAILING: TOTAL
WALKING IN HOSPITAL ROOM: TOTAL
TURNING FROM BACK TO SIDE WHILE IN FLAT BAD: TOTAL
PERSONAL GROOMING: A LOT
PERSONAL GROOMING: TOTAL
DRESSING REGULAR LOWER BODY CLOTHING: TOTAL
TOILETING: TOTAL
STANDING UP FROM CHAIR USING ARMS: TOTAL
TURNING FROM BACK TO SIDE WHILE IN FLAT BAD: A LOT
MOVING TO AND FROM BED TO CHAIR: TOTAL
DAILY ACTIVITIY SCORE: 6
CLIMB 3 TO 5 STEPS WITH RAILING: TOTAL
MOVING FROM LYING ON BACK TO SITTING ON SIDE OF FLAT BED WITH BEDRAILS: A LOT
DRESSING REGULAR LOWER BODY CLOTHING: TOTAL
MOBILITY SCORE: 8
WALKING IN HOSPITAL ROOM: TOTAL
MOBILITY SCORE: 6
HELP NEEDED FOR BATHING: TOTAL
MOVING FROM LYING ON BACK TO SITTING ON SIDE OF FLAT BED WITH BEDRAILS: TOTAL
EATING MEALS: TOTAL
MOVING TO AND FROM BED TO CHAIR: TOTAL

## 2025-05-16 ASSESSMENT — PAIN SCALES - PAIN ASSESSMENT IN ADVANCED DEMENTIA (PAINAD)
TOTALSCORE: 3
FACIALEXPRESSION: SAD, FRIGHTENED, FROWN
TOTALSCORE: MEDICATION (SEE MAR)
CONSOLABILITY: DISTRACTED OR REASSURED BY VOICE/TOUCH
BODYLANGUAGE: TENSE, DISTRESSED PACING, FIDGETING
BREATHING: NORMAL

## 2025-05-16 ASSESSMENT — PAIN SCALES - WONG BAKER
WONGBAKER_NUMERICALRESPONSE: HURTS LITTLE MORE
WONGBAKER_NUMERICALRESPONSE: HURTS LITTLE MORE
WONGBAKER_NUMERICALRESPONSE: NO HURT

## 2025-05-16 ASSESSMENT — PAIN - FUNCTIONAL ASSESSMENT
PAIN_FUNCTIONAL_ASSESSMENT: WONG-BAKER FACES
PAIN_FUNCTIONAL_ASSESSMENT: WONG-BAKER FACES

## 2025-05-16 ASSESSMENT — ACTIVITIES OF DAILY LIVING (ADL)
BATHING_ASSISTANCE: TOTAL
LACK_OF_TRANSPORTATION: NO
LACK_OF_TRANSPORTATION: NO

## 2025-05-16 NOTE — CARE PLAN
The patient's goals for the shift include      The clinical goals for the shift include patient will remain safe and free from falls or harm throughout the shift      Problem: Pain - Adult  Goal: Verbalizes/displays adequate comfort level or baseline comfort level  Outcome: Progressing     Problem: Safety - Adult  Goal: Free from fall injury  Outcome: Progressing     Problem: Discharge Planning  Goal: Discharge to home or other facility with appropriate resources  Outcome: Progressing     Problem: Chronic Conditions and Co-morbidities  Goal: Patient's chronic conditions and co-morbidity symptoms are monitored and maintained or improved  Outcome: Progressing     Problem: Nutrition  Goal: Nutrient intake appropriate for maintaining nutritional needs  Outcome: Progressing     Problem: Fall/Injury  Goal: Not fall by end of shift  Outcome: Progressing  Goal: Be free from injury by end of the shift  Outcome: Progressing  Goal: Verbalize understanding of personal risk factors for fall in the hospital  Outcome: Progressing  Goal: Verbalize understanding of risk factor reduction measures to prevent injury from fall in the home  Outcome: Progressing  Goal: Use assistive devices by end of the shift  Outcome: Progressing  Goal: Pace activities to prevent fatigue by end of the shift  Outcome: Progressing     Problem: Deep Vein Thrombosis  Goal: I will remain free from complications of deep vein thrombosis and maintain current level of mobility  Outcome: Progressing     Problem: Skin  Goal: Decreased wound size/increased tissue granulation at next dressing change  Outcome: Progressing  Goal: Participates in plan/prevention/treatment measures  Outcome: Progressing  Goal: Prevent/manage excess moisture  Outcome: Progressing  Goal: Prevent/minimize sheer/friction injuries  Outcome: Progressing  Goal: Promote/optimize nutrition  Outcome: Progressing  Goal: Promote skin healing  Outcome: Progressing

## 2025-05-16 NOTE — CARE PLAN
The patient's goals for the shift include      The clinical goals for the shift include Free from falls during shift, free from harm, encourage nutrition and hydration    Barriers to this include confusion.       Problem: Pain - Adult  Goal: Verbalizes/displays adequate comfort level or baseline comfort level  Outcome: Progressing     Problem: Safety - Adult  Goal: Free from fall injury  Outcome: Progressing     Problem: Discharge Planning  Goal: Discharge to home or other facility with appropriate resources  Outcome: Progressing     Problem: Chronic Conditions and Co-morbidities  Goal: Patient's chronic conditions and co-morbidity symptoms are monitored and maintained or improved  Outcome: Progressing     Problem: Nutrition  Goal: Nutrient intake appropriate for maintaining nutritional needs  Outcome: Progressing     Problem: Fall/Injury  Goal: Not fall by end of shift  Outcome: Progressing  Goal: Be free from injury by end of the shift  Outcome: Progressing  Goal: Verbalize understanding of personal risk factors for fall in the hospital  Outcome: Progressing  Goal: Verbalize understanding of risk factor reduction measures to prevent injury from fall in the home  Outcome: Progressing  Goal: Use assistive devices by end of the shift  Outcome: Progressing  Goal: Pace activities to prevent fatigue by end of the shift  Outcome: Progressing     Problem: Deep Vein Thrombosis  Goal: I will remain free from complications of deep vein thrombosis and maintain current level of mobility  Outcome: Progressing     Problem: Skin  Goal: Decreased wound size/increased tissue granulation at next dressing change  Outcome: Progressing  Flowsheets (Taken 5/16/2025 4646)  Decreased wound size/increased tissue granulation at next dressing change:   Promote sleep for wound healing   Protective dressings over bony prominences  Goal: Participates in plan/prevention/treatment measures  Outcome: Progressing  Flowsheets (Taken 5/16/2025  0729)  Participates in plan/prevention/treatment measures:   Discuss with provider PT/OT consult   Elevate heels  Goal: Prevent/manage excess moisture  Outcome: Progressing  Flowsheets (Taken 5/16/2025 0729)  Prevent/manage excess moisture:   Monitor for/manage infection if present   Cleanse incontinence/protect with barrier cream   Follow provider orders for dressing changes  Goal: Prevent/minimize sheer/friction injuries  Outcome: Progressing  Flowsheets (Taken 5/16/2025 0729)  Prevent/minimize sheer/friction injuries:   Turn/reposition every 2 hours/use positioning/transfer devices   Use pull sheet   HOB 30 degrees or less  Goal: Promote/optimize nutrition  Outcome: Progressing  Flowsheets (Taken 5/16/2025 0729)  Promote/optimize nutrition:   Monitor/record intake including meals   Offer water/supplements/favorite foods  Goal: Promote skin healing  Outcome: Progressing  Flowsheets (Taken 5/16/2025 0729)  Promote skin healing:   Protective dressings over bony prominences   Turn/reposition every 2 hours/use positioning/transfer devices

## 2025-05-16 NOTE — CONSULTS
"Nutrition Initial Assessment:   Nutrition Assessment    Reason for Assessment: Admission nursing screening    Patient is a 84 y.o. male presenting from [facility] with aggressive behavior. PMH of CKD, HLD, dementia. Pt is disoriented per flowsheets.    Patient was assessed virtually.    Nutrition History:  Food and Nutrient History: No PO intakes documented since admit. Per RN, pt's brother requests a soft diet for pt.       Anthropometrics:  Height: 180.3 cm (5' 11\")   Weight: 69.2 kg (152 lb 8.9 oz)   BMI (Calculated): 21.29  IBW/kg (Dietitian Calculated): 78.18 kg  Percent of IBW: 89 %                      Weight History:   Wt Readings from Last 10 Encounters:   05/15/25 69.2 kg (152 lb 8.9 oz)   03/12/25 87.4 kg (192 lb 10.9 oz)   06/23/21 75.9 kg (167 lb 6.4 oz)   05/18/21 79.6 kg (175 lb 6.4 oz)   05/13/21 79.4 kg (175 lb)   04/26/21 78.7 kg (173 lb 9.6 oz)   03/26/21 79.1 kg (174 lb 4.8 oz)   03/08/21 78.2 kg (172 lb 8 oz)   01/25/21 73.5 kg (162 lb)   01/21/21 74.8 kg (165 lb)     Weight Change %:  Weight History / % Weight Change: Noted a 40 lbs weight loss from March to May 2025. Suspect inaccurate.    Nutrition Focused Physical Exam Findings:  Defer: remote assessment  Subcutaneous Fat Loss:      Muscle Wasting:     Edema:     Physical Findings:  Teeth Findings: Edentulous    Nutrition Significant Labs:  BMP Trend:   Results from last 7 days   Lab Units 05/16/25  0446 05/15/25  1441   GLUCOSE mg/dL 90 128*   CALCIUM mg/dL 8.6 9.5   SODIUM mmol/L 143 142   POTASSIUM mmol/L 3.4* 3.8   CO2 mmol/L 24 26   CHLORIDE mmol/L 111* 106   BUN mg/dL 16 19   CREATININE mg/dL 1.38* 1.65*        Nutrition Specific Medications:  atorvastatin, 40 mg, oral, Nightly  furosemide, 20 mg, oral, Daily  pantoprazole, 40 mg, oral, Daily before breakfast  potassium chloride CR, 10 mEq, oral, Daily with breakfast  sennosides-docusate sodium, 2 tablet, oral, Nightly  zinc oxide, 1 Application, Topical, BID      I/O:   Last BM Date: " 05/16/25; Stool Appearance: Soft (05/16/25 0013)    Dietary Orders (From admission, onward)       Start     Ordered    05/16/25 1004  Adult diet Regular; Soft and bite sized 6  Diet effective now        Question Answer Comment   Diet type Regular    Texture Soft and bite sized 6        05/16/25 1004    05/15/25 2310  May Not Participate in Room Service  ( ROOM SERVICE MAY NOT PARTICIPATE)  Once        Question:  .  Answer:  Yes    05/15/25 2309                     Estimated Needs:   Total Energy Estimated Needs in 24 hours (kCal): 2076 kCal  Method for Estimating Needs: ABW  Total Protein Estimated Needs in 24 Hours (g): 86 g  Method for Estimating 24 Hour Protein Needs: ABW, older adult  Total Fluid Estimated Needs in 24 Hours (mL): 2076 mL  Method for Estimating 24 Hour Fluid Needs: ABW  Patient on Order Fluid Restriction: No        Nutrition Diagnosis        Nutrition Diagnosis  Patient has Nutrition Diagnosis: Yes  Diagnosis Status (1): New  Nutrition Diagnosis 1: Difficulty chewing  Related to (1): dentition  As Evidenced by (1): pt is edentulous, brother requests soft diet       Nutrition Interventions/Recommendations   Nutrition prescription for oral nutrition    Nutrition Recommendations:  Individualized Nutrition Prescription Provided for : Provide soft diet. Provie Ensure Plus BID.    Nutrition Interventions/Goals:   Meals and Snacks: Texture-modified diet  Goal: Initiate orders for soft diet per famly request  Medical Food Supplement: Commercial beverage medical food supplement therapy  Goal: Initiate orders for Ensure Plus BID (700 kcal, 40 gm protein total)  Coordination of Care with Providers: Nursing  Goal: Spoke with RN, Dayan?      Education Documentation  No documentation found.            Nutrition Monitoring and Evaluation   Food/Nutrient Related History Monitoring  Monitoring and Evaluation Plan: Intake / amount of food  Intake / Amount of food: Consumes at least 75% or more of  meals/snacks/supplements                   Goal Status: New goal(s) identified    Time Spent (min): 45 minutes         05/16/25 at 10:07 AM - NIKKIE HAYWOOD RDN, SHERIE

## 2025-05-16 NOTE — H&P
History Of Present Illness  Jabari Dumas is a 84 y.o. male presenting with complaint of change in mental status and confusion.  The patient was aggressive towards the nursing staff.  In the emergency room initial workup was done and the patient was found to have a UTI.  The patient has been admitted to the floor for further management.  The patient was not able to provide much history.  The most of the history was taken from the ER record and old record.  The patient denies any nausea, vomiting, diarrhea, dysuria [facility] opacity.  No hematemesis, hematochezia or melena.  No odynophagia, dysphagia or recent change in weight appetite.  The patient was recently in the hospital with a fall and had a fall found to have a left femur fracture.  Patient is a pleasant gentleman pulses.  Postop course was complicated by wound dehiscence and infection.  For this patient was oral antibiotics and local wound care.  In the emergency room patient glucose 90, sodium 143, potassium 3.4, chloride 111, bicarb 24, anion gap 14, BUN 16, creatinine 1.38, glucose 90, WBC 5.4, hemoglobin 10.7, hematocrit 34.7 and platelets 216.  Urine was consistent with a UTI.  Past Medical History  Medical History[1]  Coronary arteries disease, CVA, peptic ulcer disease,  Surgical History  Surgical History[2]  Pacemaker placement  EGD  Cholecystectomy    Social History  He reports that he has quit smoking. His smoking use included cigarettes. He does not have any smokeless tobacco history on file. He reports that he does not currently use alcohol. No history on file for drug use.    Family History  Family History[3]     Allergies  Patient has no known allergies.    Review of Systems  I reviewed all systems reviewed as above otherwise is negative.  Physical Exam  HEENT:  Head externally atraumatic, no pallor, no icterus, extraocular movements intact, pupils reactive to light, oral mucosa moist and throat clear.  Neck:  Supple, no JVP, no palpable  adenopathy or thyromegaly.  No carotid bruit.  Chest:  Clear to auscultation and resonant.  Heart:  Regular rate and rhythm, no murmur or gallop could be appreciated.  Abdomen:  Soft, nontender, bowel sounds present, normoactive, no palpable hepatosplenomegaly.  Extremities:  No edema, pulses present, no cyanosis or clubbing.  CNS:  Patient alert, oriented x 1-2, moving all extremities but more cannot be appreciated because patient was not giving his best effort and deep tendon reflexes symmetrical, cranial nerves 2-12 grossly intact.  Skin: The patient has a wound in the left groin.  Musculoskeletal:  No joint swelling or erythema, range of movement normal.  Last Recorded Vitals  Heart Rate:  [65-85]   Temp:  [36 °C (96.8 °F)-37.1 °C (98.8 °F)]   Resp:  [16-20]   BP: ()/(41-84)   SpO2:  [93 %-100 %]       Relevant Results        Results for orders placed or performed during the hospital encounter of 05/15/25 (from the past 24 hours)   ECG 12 lead   Result Value Ref Range    Ventricular Rate 87 BPM    QRS Duration 140 ms    QT Interval 398 ms    QTC Calculation(Bazett) 478 ms    R Axis 244 degrees    T Axis 122 degrees    QRS Count 15 beats    Q Onset 206 ms    T Offset 405 ms    QTC Fredericia 450 ms   Blood Culture    Specimen: Peripheral Venipuncture; Blood culture   Result Value Ref Range    Blood Culture Loaded on Instrument - Culture in progress    Blood Culture    Specimen: Peripheral Venipuncture; Blood culture   Result Value Ref Range    Blood Culture Loaded on Instrument - Culture in progress    Lactate   Result Value Ref Range    Lactate 1.5 0.4 - 2.0 mmol/L   CBC   Result Value Ref Range    WBC 5.4 4.4 - 11.3 x10*3/uL    nRBC 0.0 0.0 - 0.0 /100 WBCs    RBC 3.79 (L) 4.50 - 5.90 x10*6/uL    Hemoglobin 10.7 (L) 13.5 - 17.5 g/dL    Hematocrit 34.7 (L) 41.0 - 52.0 %    MCV 92 80 - 100 fL    MCH 28.2 26.0 - 34.0 pg    MCHC 30.8 (L) 32.0 - 36.0 g/dL    RDW 15.9 (H) 11.5 - 14.5 %    Platelets 216 150 - 450  x10*3/uL   Comprehensive metabolic panel   Result Value Ref Range    Glucose 90 74 - 99 mg/dL    Sodium 143 136 - 145 mmol/L    Potassium 3.4 (L) 3.5 - 5.3 mmol/L    Chloride 111 (H) 98 - 107 mmol/L    Bicarbonate 24 21 - 32 mmol/L    Anion Gap 11 10 - 20 mmol/L    Urea Nitrogen 16 6 - 23 mg/dL    Creatinine 1.38 (H) 0.50 - 1.30 mg/dL    eGFR 50 (L) >60 mL/min/1.73m*2    Calcium 8.6 8.6 - 10.3 mg/dL    Albumin 2.8 (L) 3.4 - 5.0 g/dL    Alkaline Phosphatase 93 33 - 136 U/L    Total Protein 5.3 (L) 6.4 - 8.2 g/dL    AST 13 9 - 39 U/L    Bilirubin, Total 0.6 0.0 - 1.2 mg/dL    ALT 9 (L) 10 - 52 U/L   Urinalysis with Reflex Microscopic   Result Value Ref Range    Color, Urine Yellow Straw, Yellow    Appearance, Urine Cloudy (N) Clear    Specific Gravity, Urine <=1.005 (N) 1.005 - 1.035    pH, Urine 6.5 5.0, 5.5, 6.0, 6.5, 7.0, 7.5, 8.0    Protein, Urine 30 (1+) (A) NEGATIVE, TRACE mg/dL    Glucose, Urine NEGATIVE NEGATIVE mg/dL    Blood, Urine TRACE (A) NEGATIVE mg/dL    Ketones, Urine TRACE (A) NEGATIVE mg/dL    Bilirubin, Urine NEGATIVE NEGATIVE mg/dL    Urobilinogen, Urine 0.2 0.2, 1.0 mg/dL    Nitrite, Urine NEGATIVE NEGATIVE    Leukocyte Esterase, Urine SMALL (1+) (A) NEGATIVE   Microscopic Only, Urine   Result Value Ref Range    WBC, Urine >50 (A) 1-5, NONE /HPF    WBC Clumps, Urine OCCASIONAL Reference range not established. /HPF    RBC, Urine 3-5 NONE, 1-2, 3-5 /HPF    Squamous Epithelial Cells, Urine 1-9 (SPARSE) Reference range not established. /HPF     Prior to Admission medications    Medication Sig Start Date End Date Taking? Authorizing Provider   atorvastatin (Lipitor) 40 mg tablet Take 1 tablet (40 mg) by mouth once daily at bedtime. 1/30/17  Yes Historical Provider, MD   doxycycline (Vibramycin) 100 mg capsule Take 1 capsule (100 mg) by mouth 2 times a day for 10 days. Take with at least 8 ounces (large glass) of water, do not lie down for 30 minutes after 5/6/25 5/16/25 Yes Chan Pantoja PA-C    finasteride (Proscar) 5 mg tablet Take 1 tablet (5 mg) by mouth once daily.   Yes Historical Provider, MD   FLUoxetine (PROzac) 10 mg capsule Take 1 capsule (10 mg) by mouth once daily. 1/20/25  Yes Historical Provider, MD   furosemide (Lasix) 20 mg tablet Take 1 tablet (20 mg) by mouth once daily. 5/7/25  Yes Historical Provider, MD   oxyCODONE (Roxicodone) 5 mg immediate release tablet Take 1 tablet (5 mg) by mouth every 6 hours if needed for severe pain (7 - 10).   Yes Historical Provider, MD   pantoprazole (ProtoNix) 40 mg EC tablet Take 1 tablet (40 mg) by mouth once daily in the morning. Take before meals. Do not crush, chew, or split. 3/13/25  Yes Evelyn Freeman, DO   potassium chloride CR 10 mEq ER tablet Take 1 tablet (10 mEq) by mouth once daily.   Yes Historical Provider, MD   sennosides-docusate sodium (Sarah-Colace) 8.6-50 mg tablet Take 2 tablets by mouth once daily at bedtime. 3/12/25  Yes Evelyn Freeman DO   tamsulosin (Flomax) 0.4 mg 24 hr capsule Take 1 capsule (0.4 mg) by mouth once daily.   Yes Historical Provider, MD   traZODone (Desyrel) 50 mg tablet Take 1 tablet (50 mg) by mouth once daily at bedtime. 2/24/25  Yes Historical Provider, MD   furosemide (Lasix) 40 mg tablet Take 0.5 tablets (20 mg) by mouth once daily.  Patient not taking: Reported on 5/15/2025 3/12/25 4/11/25  Evelyn Freeman DO   heparin sodium,porcine (heparin, porcine,) 5,000 unit/mL injection Inject 1 mL (5,000 Units) under the skin every 8 hours. For one month until mobile or cleared by orthopedic surgery  Patient not taking: Reported on 5/15/2025 3/12/25   Evelyn Freeman DO   zinc oxide 40 % ointment ointment Apply 1 Application topically 2 times a day. Apply to groin and scrotum 3/12/25   Evelyn Freeman, DO     Current Medications[4]  Imaging  CT chest abdomen pelvis w IV contrast  Result Date: 5/15/2025  1. No acute traumatic abnormality identified in the chest, abdomen, or pelvis. 2. Dependent  patchy and consolidative opacities in the bilateral lung bases concerning for infectious or inflammatory process, including aspiration. There is also likely a component of volume loss. 3. Enlarged heterogeneous appearance of the left lobe of the thyroid in partially visualized enlarged left cervical lymph node. Follow-up ultrasound recommended for further assessment. 4. Nodular contour of the liver, suggestive of cirrhosis. Correlate with hepatic function tests. 5. Prominent soft tissue defect overlying the left inguinal region/upper thigh with surrounding fat stranding and adjacent air locules. Correlate for recent intervention or evidence of infection.   MACRO: None   Signed by: Jayant Quintero 5/15/2025 8:55 PM Dictation workstation:   [id number]    CT head wo IV contrast  Result Date: 5/15/2025  CT HEAD: 1. Assessment limited by motion. 2. No definite acute intracranial abnormality within this limitation.     CT CERVICAL SPINE: 1. No acute fracture or traumatic malalignment of the cervical spine. 2. Moderate-to-severe degenerative changes of the cervical spine. 3. Enlarged heterogeneous appearance of the left lobe of the thyroid. Follow-up ultrasound should be considered for further assessment. 4. Enlarged lymph node in the upper left jugular chain measuring up to 3 cm. Appearance is concerning for malignant/metastatic process.   MACRO: None   Signed by: Jayant Quintero 5/15/2025 8:41 PM Dictation workstation:   [id number]    CT cervical spine wo IV contrast  Result Date: 5/15/2025  CT HEAD: 1. Assessment limited by motion. 2. No definite acute intracranial abnormality within this limitation.     CT CERVICAL SPINE: 1. No acute fracture or traumatic malalignment of the cervical spine. 2. Moderate-to-severe degenerative changes of the cervical spine. 3. Enlarged heterogeneous appearance of the left lobe of the thyroid. Follow-up ultrasound should be considered for further assessment. 4. Enlarged lymph node in the  upper left jugular chain measuring up to 3 cm. Appearance is concerning for malignant/metastatic process.   MACRO: None   Signed by: Jayant Quintero 5/15/2025 8:41 PM Dictation workstation:   [id number]      Cardiology, Vascular, and Other Imaging  ECG 12 lead  Result Date: 5/16/2025  Atrial fibrillation Right bundle branch block Possible Lateral infarct , age undetermined Abnormal ECG      Susceptibility data from last 90 days.  Collected Specimen Info Organism Amoxicillin/Clavulanate Ampicillin Ampicillin/Sulbactam Cefazolin Cefazolin (uncomplicated UTIs only) Ceftriaxone Ciprofloxacin Clindamycin Erythromycin Gentamicin Nitrofurantoin Oxacillin   03/09/25 Fluid from SPUTUM Methicillin Susceptible Staphylococcus aureus (MSSA)         S  S    S   03/08/25 Urine from Clean Catch/Voided Escherichia coli  S  R  I  I  S  S  S    R  S      Collected Specimen Info Organism Piperacillin/Tazobactam Tetracycline Tobramycin Trimethoprim/Sulfamethoxazole Vancomycin   03/09/25 Fluid from SPUTUM Methicillin Susceptible Staphylococcus aureus (MSSA)   S   S  S   03/08/25 Urine from Clean Catch/Voided Escherichia coli  S   I  S         Assessment/Plan   Assessment & Plan  Acute kidney injury    Altered mental status    Acute cystitis with hematuria    Open wound of left hip    Abnormal CT scan  Hiatal hernia  Cyclic vomiting  Cirrhosis of the liver on CAT scan  Bilateral pneumonia  Possible aspiration pneumonia  Dementia  Chronic kidney disease stage IIIb  Constipation    Plan: Continue current medication.  Supportive care.  Patient was started on broad-spectrum IV antibiotics.  For pneumonia or UTI.  Check urine culture, blood culture and sputum culture.  Give aerosol treatment.  Monitor pulse ox.  Local wound care for the left inguinal wound.  Consult wound care.  Consult ID and pulmonary.  Monitor renal function panel.  Monitor electrolytes and replete as needed.  Patient mild hypokalemia.  Patient had diarrhea.  Continue the  present OmniHIB dose.  Check ammonia level.  Patient anxiety and agitation.  Patient does not aggressive towards the staff normal.  Start patient on Zyprexa as needed.  Medication reviewed and ordered.  We will take Dupee, fall, aspiration, decubitus, and DVT precautions                 Ramon Ramos MD         [1]   Past Medical History:  Diagnosis Date    Gastrointestinal hemorrhage 03/08/2025   [2]   Past Surgical History:  Procedure Laterality Date    CT GUIDED IMAGING FOR NEEDLE PLACEMENT  12/7/2017    CT GUIDED IMAGING FOR NEEDLE PLACEMENT [facility]   [3] No family history on file.  [4]   Current Facility-Administered Medications:     acetaminophen (Tylenol) tablet 650 mg, 650 mg, oral, q4h PRN **OR** acetaminophen (Tylenol) oral liquid 650 mg, 650 mg, oral, q4h PRN **OR** acetaminophen (Tylenol) suppository 650 mg, 650 mg, rectal, q4h PRN, Ramon Ramos MD    atorvastatin (Lipitor) tablet 40 mg, 40 mg, oral, Nightly, Ramon Ramos MD, 40 mg at 05/16/25 0031    benzocaine-menthol (Cepastat Sore Throat) lozenge 1 lozenge, 1 lozenge, Mouth/Throat, q2h PRN, Ramon Ramos MD    dextromethorphan-guaifenesin (Robitussin DM)  mg/5 mL oral liquid 5 mL, 5 mL, oral, q4h PRN, Ramon Ramos MD    finasteride (Proscar) tablet 5 mg, 5 mg, oral, Daily, Ramon Ramos MD, 5 mg at 05/16/25 0855    FLUoxetine (PROzac) capsule 10 mg, 10 mg, oral, Daily, Ramon Ramos MD, 10 mg at 05/16/25 1012    furosemide (Lasix) tablet 20 mg, 20 mg, oral, Daily, Ramon Ramos MD, 20 mg at 05/16/25 0855    guaiFENesin (Mucinex) 12 hr tablet 600 mg, 600 mg, oral, q12h PRN, Ramon Ramos MD    heparin (porcine) injection 5,000 Units, 5,000 Units, subcutaneous, q8h YARELIS, Ramon Ramos MD, 5,000 Units at 05/16/25 1304    melatonin tablet 6 mg, 6 mg, oral, Nightly PRN, Ramon Ramos MD, 6 mg at 05/16/25 0031    OLANZapine (ZyPREXA) injection 2.5 mg, 2.5 mg,  intramuscular, q4h PRN, Ramon Ramos MD, 2.5 mg at 05/16/25 0005    ondansetron ODT (Zofran-ODT) disintegrating tablet 4 mg, 4 mg, oral, q8h PRN **OR** ondansetron (Zofran) injection 4 mg, 4 mg, intravenous, q8h PRN, Ramon Ramos MD    oxyCODONE (Roxicodone) immediate release tablet 5 mg, 5 mg, oral, q6h PRN, Ramon Ramos MD    pantoprazole (ProtoNix) EC tablet 40 mg, 40 mg, oral, Daily before breakfast, Ramon Ramos MD, 40 mg at 05/16/25 0604    piperacillin-tazobactam (Zosyn) 2.25 g in dextrose (iso) IV 50 mL, 2.25 g, intravenous, q6h, Rmaon Ramos MD, Stopped at 05/16/25 1302    polyethylene glycol (Glycolax, Miralax) packet 17 g, 17 g, oral, Daily PRN, Ramon Ramos MD    potassium chloride CR (Klor-Con) ER tablet 10 mEq, 10 mEq, oral, Daily with breakfast, Ramon Ramos MD, 10 mEq at 05/16/25 0855    sennosides-docusate sodium (Sarah-Colace) 8.6-50 mg per tablet 2 tablet, 2 tablet, oral, Nightly, Ramon Ramos MD    tamsulosin (Flomax) 24 hr capsule 0.4 mg, 0.4 mg, oral, Daily, Ramon Ramos MD, 0.4 mg at 05/16/25 0854    traZODone (Desyrel) tablet 50 mg, 50 mg, oral, Nightly, Ramon Ramos MD, 50 mg at 05/16/25 0031    zinc oxide 40 % ointment 1 Application, 1 Application, Topical, BID, Ramon Ramos MD, 1 Application at 05/16/25 1017

## 2025-05-16 NOTE — ASSESSMENT & PLAN NOTE
Hiatal hernia  Cyclic vomiting  Cirrhosis of the liver on CAT scan  Bilateral pneumonia  Possible aspiration pneumonia  Dementia  Chronic kidney disease stage IIIb  Constipation    Plan: Continue current medication.  Supportive care.  Patient was started on broad-spectrum IV antibiotics.  For pneumonia or UTI.  Check urine culture, blood culture and sputum culture.  Give aerosol treatment.  Monitor pulse ox.  Local wound care for the left inguinal wound.  Consult wound care.  Consult ID and pulmonary.  Monitor renal function panel.  Monitor electrolytes and replete as needed.  Patient mild hypokalemia.  Patient had diarrhea.  Continue the present OmniHIB dose.  Check ammonia level.  Patient anxiety and agitation.  Patient does not aggressive towards the staff normal.  Start patient on Zyprexa as needed.  Medication reviewed and ordered.  We will take Dupee, fall, aspiration, decubitus, and DVT precautions

## 2025-05-16 NOTE — PROGRESS NOTES
05/16/25 0922   Discharge Planning   Living Arrangements Other (Comment)  (Patient is a resident of [facility])   Support Systems Family members   Assistance Needed Patient has help for ADLs and is dependent for IADLs.   Type of Residence Nursing home/residential care   Do you have animals or pets at home? No   Who is requesting discharge planning? Provider   Home or Post Acute Services Post acute facilities (Rehab/SNF/etc)   Type of Post Acute Facility Services Long term care   Expected Discharge Disposition Inter   Does the patient need discharge transport arranged? Yes   RoundTrip coordination needed? Yes   Has discharge transport been arranged? No   Financial Resource Strain   How hard is it for you to pay for the very basics like food, housing, medical care, and heating? Not hard   Housing Stability   In the last 12 months, was there a time when you were not able to pay the mortgage or rent on time? N   In the past 12 months, how many times have you moved where you were living? 0   At any time in the past 12 months, were you homeless or living in a shelter (including now)? N   Transportation Needs   In the past 12 months, has lack of transportation kept you from medical appointments or from getting medications? no   In the past 12 months, has lack of transportation kept you from meetings, work, or from getting things needed for daily living? No   Patient Choice   Provider Choice list and CMS website (https://medicare.gov/care-compare#search) for post-acute Quality and Resource Measure Data were provided and reviewed with: Family   Patient / Family choosing to utilize agency / facility established prior to hospitalization Yes   Stroke Family Assessment   Stroke Family Assessment Needed No   Intensity of Service   Intensity of Service 0-30 min     MSW unable to meet with the patient do to him sleeping. Called patient's brother, Gordo. He reports patient is long-term care at [facility] and plan at this time  is for him to return there upon discharge. Updates sent to [facility] for their records.     10:42 AM  [facility] confirmed no barriers to patient's return. Care Transitions will continue to follow.

## 2025-05-16 NOTE — PROGRESS NOTES
Physical Therapy    Physical Therapy Evaluation    Patient Name: Jabari Dumas  MRN: [id number]  Department: [address]  Room: [address] [place] [address]  Today's Date: 5/16/2025   Time Calculation  Start Time: 0859  Stop Time: 0917  Time Calculation (min): 18 min    Assessment/Plan   PT Assessment  PT Assessment Results: Decreased strength, Decreased range of motion, Decreased endurance, Impaired balance, Decreased mobility, Decreased coordination, Decreased cognition, Impaired judgement, Decreased safety awareness, Impaired vision, Impaired tone, Decreased skin integrity, Pain  Rehab Prognosis: Poor  Barriers to Discharge Home: Caregiver assistance, Cognition needs, Physical needs  Caregiver Assistance: Caregiver assistance needed per identified barriers - however, level of patient's required assistance exceeds assistance available at home  Cognition Needs: 24hr supervision for safety awareness needed, Insight of patient limited regarding functional ability/needs, Cognition-related high falls risk  Physical Needs: 24hr mobility assistance needed, High falls risk due to function or environment  Evaluation/Treatment Tolerance: Other (Comment) (limited by cognition)  Medical Staff Made Aware: Yes  Strengths: Support of extended family/friends  Barriers to Participation: Comorbidities  End of Session Communication: Bedside nurse  Assessment Comment: Pt w/ poor activity tolerance, unable to effectively participate w/ todays session. Will see pt on trial basis pending cognition and pt ability to participate.  End of Session Patient Position: Bed, 3 rail up, Alarm on (pt on Rt side w/ pillow support between knees and behind back.)  IP OR SWING BED PT PLAN  Inpatient or Swing Bed: Inpatient  PT Plan  Treatment/Interventions: Bed mobility, Transfer training, Gait training, Balance training, Endurance training, Range of motion, Therapeutic exercise, Therapeutic activity  PT Plan: Ongoing PT  PT Frequency: 3 times per week  PT Discharge  Recommendations: Moderate intensity level of continued care, Other (comment) (trial)  Equipment Recommended upon Discharge:  (TBD)  PT Recommended Transfer Status: Assist x2  PT - OK to Discharge: Yes    Subjective     PT Visit Info:  PT Received On: 05/16/25  General Visit Information:  General  Reason for Referral: Impaired mobility, ARAM  Referred By: Dr Ramos  Past Medical History Relevant to Rehab: SSS s/p pacer, CAD, CHF, CABG 2017, open wound Lt thigh, CKD 3, dementia w/ behavioral disturbances, COPD, anemia, GI bleed, anxiety, HTN, UTI, Lt hip fx s/p hemiarthroplasty via anterior approach  3/9/25  Family/Caregiver Present: No  Co-Treatment: OT  Co-Treatment Reason: to maximize pt safety and functional outcome  Prior to Session Communication: Bedside nurse  Patient Position Received: Bed, 3 rail up, Alarm on  Preferred Learning Style: kinesthetic, verbal  General Comment: Pt is an 84 y.o. male adm from SNF due to agressive behavior. Pt at rehab following Lt hip fx repair 3/25, but had been in a memory care unit at an AL facility prior to his fall. Pt was cleared for PT eval per nurse.  Home Living:  Home Living  Type of Home: Assisted living ([facility] prior to fall in 3/25)  Lives With:  (care staff)  Prior Level of Function:  Prior Function Per Pt/Caregiver Report  Prior Function Comments: Per EMR, brother notes pt decline over the past month. Per last intake, pt was able to amb short distance w/ assist prior to his fall.  Precautions:  Precautions  Hearing/Visual Limitations: pt keeping eyes closed throughout session,  LE Weight Bearing Status: Weight Bearing as Tolerated (LLE)  Medical Precautions: Fall precautions (recent anterior Lt hip hemiarthroplasty (3/9/25))  Post-Surgical Precautions: Other (comment) (recent anterior Lt hip hemiarthroplasty (3/9/25))  Precautions Comment: anticipating transfer to [facility]?         Objective   Pain:  Pain Assessment  Pain Assessment: OsorioAishaBaker  FACES  Osorio-Asencio FACES Pain Rating: Hurts little more  Pain Location: Generalized  Cognition:  Cognition  Overall Cognitive Status: Impaired at baseline  Orientation Level: Disoriented X4  Following Commands: Other (Comment) (pt unable ot follow commands)  Cognition Comments: difficult to assess, pt keeping eyes closed throughout session, no responding verbally, unable to follow commands, resistive to mobility, guarded in flexed position    General Assessments:  General Observation  General Observation: Lt anterior mid-thigh wound dressing intact     Activity Tolerance  Endurance: Decreased tolerance for upright activites  Activity Tolerance Comments: poor        Coordination  Coordination Comment: pt unable to coordinate extremity use for effective functional mobility. Did note pt attempting to bring hands to face.    Postural Control  Posture Comment: guarded in flexed postion at UEs and LEs, trunk, c-spine, difficult to extend any extremity w/ assist. A;lso tends to tightly adduct and even cross BLEs    Static Sitting Balance  Static Sitting-Balance Support: Feet unsupported, No upper extremity supported  Static Sitting-Level of Assistance: Dependent, Moderate assistance  Static Sitting-Comment/Number of Minutes: Poor w/ variable dependent to mod assist to maintain sitting at EOB  Functional Assessments:  Bed Mobility  Bed Mobility: Yes  Bed Mobility 1  Bed Mobility 1: Supine to sitting, Sitting to supine  Level of Assistance 1: Dependent, +2  Bed Mobility Comments 1: to Lt EOB, assist for trunk up and clearing LEs over EOB. Dependent assist to maintain sitting balance at EOB.  Bed Mobility 2  Bed Mobility  2: Rolling right, Rolling left  Level of Assistance 2: Dependent, +2  Bed Mobility Comments 2: for positioning and boost to HOB.    Transfers  Transfer: No (not safe at this time)  Extremity/Trunk Assessments:  RLE   RLE : Exceptions to WFL (contracted into flexed and adducted postiion, ~ 10 df/pf range  available, knee extension lacking ~60 degrees)  LLE   LLE : Exceptions to WFL (contracted into flexed and adducted postiion, ~ 10 df/pf range available, knee extension lacking ~70 degrees)  Outcome Measures:  [facility] Basic Mobility  Turning from your back to your side while in a flat bed without using bedrails: Total  Moving from lying on your back to sitting on the side of a flat bed without using bedrails: Total  Moving to and from bed to chair (including a wheelchair): Total  Standing up from a chair using your arms (e.g. wheelchair or bedside chair): Total  To walk in hospital room: Total  Climbing 3-5 steps with railing: Total  Basic Mobility - Total Score: 6    Encounter Problems       Encounter Problems (Active)       Mobility       STG - Patient will ambulate 5' w/ mod assist of 2 and LRAD (Progressing)       Start:  05/16/25    Expected End:  05/30/25            Pt will consistently participate in LE exercises and/or therapeutic activities to promote functional strength, ROM, balance and safe mobility (Progressing)       Start:  05/16/25    Expected End:  05/30/25               PT Transfers       STG - Patient to transfer to and from sit to supine w/ mod assist or less (Progressing)       Start:  05/16/25    Expected End:  05/30/25            STG - Patient will transfer sit to and from stand w/ mod assist or < (Progressing)       Start:  05/16/25    Expected End:  05/30/25                   Education Documentation  Mobility Training, taught by Nani Cardenas, PT at 5/16/2025 10:06 AM.  Learner: Patient  Readiness: Acceptance  Method: Explanation, Demonstration  Response: No Evidence of Learning  Comment: pt unable to cognitively engage    Education Comments  No comments found.

## 2025-05-16 NOTE — PROGRESS NOTES
Evaluation    Patient Name: Jabari Dumas  MRN: [id number]  Department: [address]  Room: [address] [place], [place] [address]  Today's Date: 5/16/2025  Time Calculation  Start Time: 0903  Stop Time: 0918  Time Calculation (min): 15 min    Assessment  IP OT Assessment  OT Assessment: 85 yo male admits from SNF with confusion and agitation.  Initial findings positive for ARAM, cystitis w/ hematuria, and bilateral infiltrates.  On eval, patient requires total assist for transfers and ADLs which is a decline from baseline per EMR. Pt would benefit from trial OT services to increase functional capacity pending progression.  Prognosis: Fair  Barriers to Discharge Home: Cognition needs, Physical needs  Cognition Needs: Insight of patient limited regarding functional ability/needs  Physical Needs: 24hr mobility assistance needed, 24hr ADL assistance needed  Evaluation/Treatment Tolerance: Other (Comment) (Treatment limited secondary cognitive status)  End of Session Communication: Bedside nurse  End of Session Patient Position: Bed, 3 rail up, Alarm on    Plan:  Treatment Interventions: ADL retraining, Functional transfer training, UE strengthening/ROM, Endurance training, Cognitive reorientation, Patient/family training, Compensatory technique education  OT Frequency: 2 times per week  Equipment Recommended upon Discharge: Other (comment) (TBD)  OT Recommended Transfer Status: Dependent (x 2)  OT - OK to Discharge: Yes      Subjective   Current Problem:  1. Altered mental status, unspecified altered mental status type        2. Acute kidney injury        3. Acute cystitis with hematuria        4. Pneumonia of both lungs due to infectious organism, unspecified part of lung        5. Open wound of left hip, initial encounter        6. Abnormal CT scan        7. Frequent falls          OT Visit Info:  OT Received On: 05/16/25    General Visit Info:  General  Reason for Referral: Impaired ADLs, acute kidney injury  Referred By: Dr Ramos  Past Medical  History Relevant to Rehab: SSS s/p pacer, CAD, CHF, CABG 2017, open wound Lt thigh, CKD 3, dementia w/ behavioral disturbances, COPD, anemia, GI bleed, anxiety, HTN, UTI, Lt hip fx s/p hemiarthroplasty via anterior approach  3/9/25  Family/Caregiver Present: No  Co-Treatment: PT  Co-Treatment Reason: to maximize pt safety and functional outcome  Prior to Session Communication: Bedside nurse  Patient Position Received: Bed, 3 rail up, Alarm on  Preferred Learning Style: kinesthetic, verbal  General Comment: Cleared by nursing for therapy    Precautions:  Hearing/Visual Limitations: eyes closed throughout most of session  LE Weight Bearing Status: Weight Bearing as Tolerated (LLE)  Medical Precautions: Fall precautions  Precautions Comment: recent left anterior hip hemiarthroplasty (3/9/25)    Pain:  Pain Assessment  Pain Assessment: Osorio-Baker FACES  Osorio-Baker FACES Pain Rating: Hurts little more  Pain Location: Generalized  Pain Onset:  (mobility)  Pain Interventions: Repositioned (nurse aware)  Response to Interventions: Absence of non-verbal indicators of pain      Objective   Cognition:  Overall Cognitive Status: Impaired at baseline  Orientation Level: Disoriented X4  Following Commands:  (unable to follow commands this date which is a decline from baseline per EMR)     Home Living:  Type of Home: Assisted living ([facility] prior to fall in 3/25)  Lives With: Other (Comment) (Care staff)  Home Living Comments: anticipated discharge from hospital to [facility] per EMR?     Prior Function:  Level of [place]: Unable to asses at this time  Receives Help From: Other (Comment) (Care staff)  Prior Function Comments: Per EMR, brother notes pt decline over the past month. Per last intake, pt was able to amb short distance w/ assist prior to his fall.    ADL:  Eating Assistance: Total  Eating Deficit:  (nurse spoon fed patient applesauce with meds while sitting edge of bed)  Grooming Assistance:  Total  Grooming Deficit:  (anticipated)  Bathing Assistance: Total  Bathing Deficit:  (anticipated)  UE Dressing Assistance: Total  UE Dressing Deficit:  (anticipated)  LE Dressing Assistance: Total  LE Dressing Deficit:  (donning/doffing socks this date)  Toileting Assistance with Device: Total  Toileting Deficit: Urinary Catheter (+purewick)    Activity Tolerance:  Endurance: Decreased tolerance for upright activites  Activity Tolerance Comments: poor    Bed Mobility/Transfers: Bed Mobility 1  Bed Mobility 1: Supine to sitting  Level of Assistance 1: Dependent, +2  Bed Mobility Comments 1: toward the left side of bed with head elevated ~30 degrees  Bed Mobility 2  Bed Mobility  2: Sitting to supine  Level of Assistance 2: Dependent, +2  Bed Mobility 3  Bed Mobility 3: Rolling right, Rolling left  Level of Assistance 3: Dependent, +2    Sitting Balance:  max assist d/t heavy left lean    Sensation:  Light Touch: Not tested (unable)    Strength:  Strength Comments: Unable to formally assess    Extremities: RUE   RUE : Exceptions to WFL (maintains flexion contraction throughout despite attempts to passively mobilize) and LUE   LUE: Exceptions to WFL (maintains flexion contraction throughout despite attempts to passively mobilize)    Outcome Measures: [facility] Daily Activity  Putting on and taking off regular lower body clothing: Total  Bathing (including washing, rinsing, drying): Total  Putting on and taking off regular upper body clothing: Total  Toileting, which includes using toilet, bedpan or urinal: Total  Taking care of personal grooming such as brushing teeth: Total  Eating Meals: Total  Daily Activity - Total Score: 6    Education Documentation  ADL Training, taught by Nicolette Howard OT at 5/16/2025 10:25 AM.  Learner: Patient  Readiness: Acceptance  Method: Explanation  Response: Needs Reinforcement, No Evidence of Learning  Comment: Functional transfer retraining initiated    Goals:   Encounter Problems        Encounter Problems (Active)       OT Goals       ADLs (Progressing)       Start:  05/16/25    Expected End:  05/30/25       Patient will complete ADL tasks, with moderate assist using AE need in order to increase patient's safety and independence with self-care tasks.           Functional transfers (Progressing)       Start:  05/16/25    Expected End:  05/30/25       Patient will complete functional transfers with moderate assist using least restrictive device, in order to increase patient's safety and independence with daily tasks.           Activity tolerance (Progressing)       Start:  05/16/25    Expected End:  05/30/25       Patient will demonstrate the ability to participate in functional activity at least >/= 25 minutes in order to increase patient's safety and independence with daily tasks.

## 2025-05-17 LAB
Q ONSET: 206 MS
QRS COUNT: 15 BEATS
QRS DURATION: 140 MS
QT INTERVAL: 398 MS
QTC CALCULATION(BAZETT): 478 MS
QTC FREDERICIA: 450 MS
R AXIS: 244 DEGREES
T AXIS: 122 DEGREES
T OFFSET: 405 MS
VENTRICULAR RATE: 87 BPM

## 2025-05-17 PROCEDURE — 99222 1ST HOSP IP/OBS MODERATE 55: CPT | Performed by: STUDENT IN AN ORGANIZED HEALTH CARE EDUCATION/TRAINING PROGRAM

## 2025-05-17 PROCEDURE — 2500000001 HC RX 250 WO HCPCS SELF ADMINISTERED DRUGS (ALT 637 FOR MEDICARE OP): Performed by: INTERNAL MEDICINE

## 2025-05-17 PROCEDURE — 1100000001 HC PRIVATE ROOM DAILY

## 2025-05-17 PROCEDURE — 2500000005 HC RX 250 GENERAL PHARMACY W/O HCPCS: Performed by: INTERNAL MEDICINE

## 2025-05-17 PROCEDURE — 2500000004 HC RX 250 GENERAL PHARMACY W/ HCPCS (ALT 636 FOR OP/ED): Mod: JZ | Performed by: INTERNAL MEDICINE

## 2025-05-17 PROCEDURE — 2500000004 HC RX 250 GENERAL PHARMACY W/ HCPCS (ALT 636 FOR OP/ED)

## 2025-05-17 PROCEDURE — 2500000002 HC RX 250 W HCPCS SELF ADMINISTERED DRUGS (ALT 637 FOR MEDICARE OP, ALT 636 FOR OP/ED): Performed by: INTERNAL MEDICINE

## 2025-05-17 RX ORDER — VANCOMYCIN HYDROCHLORIDE 1 G/20ML
INJECTION, POWDER, LYOPHILIZED, FOR SOLUTION INTRAVENOUS DAILY PRN
Status: DISCONTINUED | OUTPATIENT
Start: 2025-05-17 | End: 2025-05-20

## 2025-05-17 RX ORDER — VANCOMYCIN 1 G/200ML
1000 INJECTION, SOLUTION INTRAVENOUS EVERY 24 HOURS
Status: DISCONTINUED | OUTPATIENT
Start: 2025-05-17 | End: 2025-05-20

## 2025-05-17 RX ADMIN — ACETAMINOPHEN 650 MG: 160 SOLUTION ORAL at 06:15

## 2025-05-17 RX ADMIN — Medication 1 APPLICATION: at 20:35

## 2025-05-17 RX ADMIN — HEPARIN SODIUM 5000 UNITS: 5000 INJECTION INTRAVENOUS; SUBCUTANEOUS at 20:34

## 2025-05-17 RX ADMIN — ATORVASTATIN CALCIUM 40 MG: 40 TABLET, FILM COATED ORAL at 20:30

## 2025-05-17 RX ADMIN — FUROSEMIDE 20 MG: 20 TABLET ORAL at 08:49

## 2025-05-17 RX ADMIN — TAMSULOSIN HYDROCHLORIDE 0.4 MG: 0.4 CAPSULE ORAL at 08:49

## 2025-05-17 RX ADMIN — HEPARIN SODIUM 5000 UNITS: 5000 INJECTION INTRAVENOUS; SUBCUTANEOUS at 06:15

## 2025-05-17 RX ADMIN — Medication 1 APPLICATION: at 08:50

## 2025-05-17 RX ADMIN — PIPERACILLIN SODIUM AND TAZOBACTAM SODIUM 2.25 G: 2; .25 INJECTION, SOLUTION INTRAVENOUS at 01:29

## 2025-05-17 RX ADMIN — PIPERACILLIN SODIUM AND TAZOBACTAM SODIUM 2.25 G: 2; .25 INJECTION, SOLUTION INTRAVENOUS at 12:04

## 2025-05-17 RX ADMIN — POTASSIUM CHLORIDE 10 MEQ: 750 TABLET, EXTENDED RELEASE ORAL at 08:49

## 2025-05-17 RX ADMIN — PANTOPRAZOLE SODIUM 40 MG: 40 TABLET, DELAYED RELEASE ORAL at 06:15

## 2025-05-17 RX ADMIN — VANCOMYCIN 1000 MG: 1 INJECTION, SOLUTION INTRAVENOUS at 13:35

## 2025-05-17 RX ADMIN — FINASTERIDE 5 MG: 5 TABLET, FILM COATED ORAL at 08:49

## 2025-05-17 RX ADMIN — HEPARIN SODIUM 5000 UNITS: 5000 INJECTION INTRAVENOUS; SUBCUTANEOUS at 13:30

## 2025-05-17 RX ADMIN — TRAZODONE HYDROCHLORIDE 50 MG: 50 TABLET ORAL at 20:31

## 2025-05-17 RX ADMIN — ACETAMINOPHEN 650 MG: 325 TABLET ORAL at 12:04

## 2025-05-17 RX ADMIN — PIPERACILLIN SODIUM AND TAZOBACTAM SODIUM 2.25 G: 2; .25 INJECTION, SOLUTION INTRAVENOUS at 06:24

## 2025-05-17 RX ADMIN — PIPERACILLIN SODIUM AND TAZOBACTAM SODIUM 2.25 G: 2; .25 INJECTION, SOLUTION INTRAVENOUS at 18:28

## 2025-05-17 RX ADMIN — Medication 6 MG: at 20:30

## 2025-05-17 RX ADMIN — FLUOXETINE HYDROCHLORIDE 10 MG: 10 CAPSULE ORAL at 08:48

## 2025-05-17 RX ADMIN — SENNOSIDES AND DOCUSATE SODIUM 2 TABLET: 50; 8.6 TABLET ORAL at 20:31

## 2025-05-17 ASSESSMENT — PAIN - FUNCTIONAL ASSESSMENT
PAIN_FUNCTIONAL_ASSESSMENT: WONG-BAKER FACES
PAIN_FUNCTIONAL_ASSESSMENT: WONG-BAKER FACES

## 2025-05-17 ASSESSMENT — ENCOUNTER SYMPTOMS
WOUND: 1
AGITATION: 1

## 2025-05-17 ASSESSMENT — PAIN SCALES - GENERAL
PAINLEVEL_OUTOF10: 0 - NO PAIN
PAINLEVEL_OUTOF10: 0 - NO PAIN

## 2025-05-17 ASSESSMENT — COGNITIVE AND FUNCTIONAL STATUS - GENERAL
DAILY ACTIVITIY SCORE: 6
MOVING TO AND FROM BED TO CHAIR: TOTAL
PERSONAL GROOMING: TOTAL
TURNING FROM BACK TO SIDE WHILE IN FLAT BAD: A LOT
STANDING UP FROM CHAIR USING ARMS: TOTAL
HELP NEEDED FOR BATHING: TOTAL
MOBILITY SCORE: 9
CLIMB 3 TO 5 STEPS WITH RAILING: TOTAL
DRESSING REGULAR LOWER BODY CLOTHING: TOTAL
WALKING IN HOSPITAL ROOM: TOTAL
MOVING FROM LYING ON BACK TO SITTING ON SIDE OF FLAT BED WITH BEDRAILS: A LITTLE
DRESSING REGULAR UPPER BODY CLOTHING: TOTAL
TOILETING: TOTAL
EATING MEALS: TOTAL

## 2025-05-17 ASSESSMENT — PAIN DESCRIPTION - LOCATION: LOCATION: HIP

## 2025-05-17 ASSESSMENT — PAIN SCALES - WONG BAKER
WONGBAKER_NUMERICALRESPONSE: HURTS LITTLE MORE
WONGBAKER_NUMERICALRESPONSE: NO HURT

## 2025-05-17 ASSESSMENT — PAIN DESCRIPTION - ORIENTATION: ORIENTATION: LEFT

## 2025-05-17 NOTE — CONSULTS
Inpatient consult to Infectious Diseases  Consult performed by: Vj Montes De Oca MD  Consult ordered by: Ramon Ramos MD            Primary MD: Ramon Ramos MD    Reason For Consult  Urinary tract infection/left hip wound infection    History Of Present Illness  Jabari Dumas is a 84 y.o. male presenting with altered mental status.  He is reported to have been aggressive and was brought to the hospital for further evaluation of iron.  He has history of left hip arthroplasty, with left anterior hip wound with positive culture of Proteus on 4/21/2025.  He is unable to provide any comprehensive history.  He has positive blood culture growing gram-positive cocci in clusters.  Urine culture from 5/15/2025 is growing Proteus.  He is on IV Zosyn  He remains afebrile     Past Medical History  He has a past medical history of Gastrointestinal hemorrhage (03/08/2025).    Surgical History  He has a past surgical history that includes CT guided imaging for needle placement (12/7/2017).     Social History     Occupational History    Not on file   Tobacco Use    Smoking status: Former     Types: Cigarettes    Smokeless tobacco: Not on file   Substance and Sexual Activity    Alcohol use: Not Currently    Drug use: Not on file    Sexual activity: Not on file     Travel History   Travel since 04/17/25    No documented travel since 04/17/25         Family History  Family History[1]  Allergies  Patient has no known allergies.     Immunization History   Administered Date(s) Administered    COVID-19, mRNA, LNP-S, PF, 30 mcg/0.3 mL dose 01/06/2021, 01/27/2021, 10/08/2021    Pfizer Gray Cap SARS-CoV-2 06/14/2022     Medications  Home medications:  Prescriptions Prior to Admission[2]  Current medications:  Scheduled medications  Scheduled Medications[3]  Continuous medications  Continuous Medications[4]  PRN medications  PRN Medications[5]    Review of Systems   Unable to perform ROS: Dementia   Skin:  Positive for  wound.   Psychiatric/Behavioral:  Positive for agitation.         Objective  Range of Vitals (last 24 hours)  Heart Rate:  [62-70]   Temp:  [36.5 °C (97.7 °F)-37.3 °C (99.1 °F)]   Resp:  [16-18]   BP: ()/(41-71)   SpO2:  [93 %-100 %]   Daily Weight  05/15/25 : 69.2 kg (152 lb 8.9 oz)    Body mass index is 21.28 kg/m².     Physical Exam  Constitutional:       General: He is awake.   HENT:      Head: Normocephalic and atraumatic.      Right Ear: External ear normal.      Left Ear: External ear normal.      Nose: Nose normal.   Eyes:      General: No scleral icterus.     Extraocular Movements: Extraocular movements intact.      Conjunctiva/sclera: Conjunctivae normal.   Cardiovascular:      Rate and Rhythm: Normal rate and regular rhythm.      Heart sounds: Normal heart sounds.   Pulmonary:      Effort: Pulmonary effort is normal.      Breath sounds: Normal breath sounds. No wheezing.   Abdominal:      General: Bowel sounds are normal.      Palpations: Abdomen is soft.      Tenderness: There is no abdominal tenderness.   Musculoskeletal:      Cervical back: Normal range of motion and neck supple.      Right lower leg: No edema.      Left lower leg: No edema.   Skin:     General: Skin is warm and dry.      Comments: Left anterior hip wound with moderate amount of granulation tissue   Neurological:      Mental Status: He is confused.   Psychiatric:         Behavior: Behavior is cooperative.          Relevant Results  Outside Hospital Results    Labs  Results from last 72 hours   Lab Units 05/16/25  0446 05/15/25  1441   WBC AUTO x10*3/uL 5.4 6.4   HEMOGLOBIN g/dL 10.7* 12.4*   HEMATOCRIT % 34.7* 39.2*   PLATELETS AUTO x10*3/uL 216 286   NEUTROS PCT AUTO %  --  72.0   LYMPHS PCT AUTO %  --  14.2   MONOS PCT AUTO %  --  12.3   EOS PCT AUTO %  --  0.9     Results from last 72 hours   Lab Units 05/16/25  0446 05/15/25  1441   SODIUM mmol/L 143 142   POTASSIUM mmol/L 3.4* 3.8   CHLORIDE mmol/L 111* 106   CO2 mmol/L 24  "26   BUN mg/dL 16 19   CREATININE mg/dL 1.38* 1.65*   GLUCOSE mg/dL 90 128*   CALCIUM mg/dL 8.6 9.5   ANION GAP mmol/L 11 14   EGFR mL/min/1.73m*2 50* 40*     Results from last 72 hours   Lab Units 05/16/25  0446 05/15/25  1441   ALK PHOS U/L 93 118   BILIRUBIN TOTAL mg/dL 0.6 0.6   PROTEIN TOTAL g/dL 5.3* 6.8   ALT U/L 9* 15   AST U/L 13 20   ALBUMIN g/dL 2.8* 3.4     Estimated Creatinine Clearance: 38.3 mL/min (A) (by C-G formula based on SCr of 1.38 mg/dL (H)).  No results found for: \"CRP\", \"SEDRATE\"  No results found for: \"HIV1X2\", \"HIVCONF\", \"CFOUWY3MX\"  No results found for: \"HEPCABINIT\", \"HEPCAB\", \"HCVPCRQUANT\"  Microbiology  Susceptibility data from last 90 days.  Collected Specimen Info Organism Amoxicillin/Clavulanate Ampicillin Ampicillin/Sulbactam Cefazolin Cefazolin (uncomplicated UTIs only) Ceftriaxone Ciprofloxacin Clindamycin Erythromycin Gentamicin Nitrofurantoin Oxacillin   05/15/25 Urine from Clean Catch/Voided Proteus mirabilis               03/09/25 Fluid from SPUTUM Methicillin Susceptible Staphylococcus aureus (MSSA)         S  S    S   03/08/25 Urine from Clean Catch/Voided Escherichia coli  S  R  I  I  S  S  S    R  S      Collected Specimen Info Organism Piperacillin/Tazobactam Tetracycline Tobramycin Trimethoprim/Sulfamethoxazole Vancomycin   05/15/25 Urine from Clean Catch/Voided Proteus mirabilis        03/09/25 Fluid from SPUTUM Methicillin Susceptible Staphylococcus aureus (MSSA)   S   S  S   03/08/25 Urine from Clean Catch/Voided Escherichia coli  S   I  S      Imaging  ECG 12 lead  Result Date: 5/17/2025  sinus Tachycardia with frequent PACs, 1 st degree AV block. Right bundle branch block Possible Lateral infarct , age undetermined Abnormal ECG Confirmed by Jeremy Hill ([id number]) on 5/17/2025 9:05:23 AM    CT chest abdomen pelvis w IV contrast  Result Date: 5/15/2025  Interpreted By:  Jayant Quintero, STUDY: CT CHEST ABDOMEN PELVIS W IV CONTRAST;  5/15/2025 8:21 pm   INDICATION: " Signs/Symptoms:fall.     COMPARISON: CT chest abdomen and pelvis 03/08/2025   ACCESSION NUMBER(S): [id number]   ORDERING CLINICIAN: CARLOS MYRICK   TECHNIQUE: Contiguous axial images of the chest, abdomen, and pelvis were obtained after the intravenous administration of 75 mL Omnipaque 350 contrast. Coronal and sagittal reformatted images were reconstructed from the axial data.   FINDINGS: CT CHEST:   MEDIASTINUM AND LYMPH NODES: Enlarged, heterogeneous appearance of the left lobe of the thyroid measuring up to 4.7 cm. This extends into the substernal space. Partially visualized enlarged left cervical lymph node measuring up to 3 cm. No mediastinal or axillary lymphadenopathy identified. No pneumomediastinum. The esophagus appears within normal limits. There is a moderate-to-large hiatal hernia.   HEART: Normal size. Extensive triple-vessel coronary vascular calcifications. No significant pericardial effusion. Pacing leads extend into the right atrium and right ventricle.   VESSELS: Normal caliber aorta without dissection. Moderate calcifications of the aortic arch and descending thoracic aorta. The main pulmonary artery is normal in diameter.   LUNG, AIRWAYS, PLEURA: There is a small amount of debris within the mid to distal trachea. Assessment of the pulmonary parenchyma limited by respiratory motion. Dependent patchy and consolidative opacities in the bilateral lung bases. No pleural effusion or pneumothorax.   CHEST WALL SOFT TISSUES: No discernible abnormality. Left pectoral generator pack.   OSSEOUS STRUCTURES: No acute osseous abnormality. Median sternotomy changes.     CT ABDOMEN/PELVIS:   LIVER: Nodular surface morphology of the liver suggesting cirrhosis. 1.1 cm hypodensity in the lateral left hepatic lobe, probably a cyst.   BILE DUCTS: No significant intrahepatic or extrahepatic dilatation.   GALLBLADDER: Cholecystectomy.   PANCREAS: No significant abnormality.   SPLEEN: No significant abnormality.    ADRENALS: No significant abnormality.   KIDNEYS, URETERS, BLADDER: 3.5 cm right renal cyst. No hydronephrosis or hydroureter. No obstructing renal or ureteral calculus. Dependent density in the right aspect of the urinary bladder which may represent a mural calcification or intraluminal calculus.   REPRODUCTIVE ORGANS: No significant abnormality.   GI: Moderate-to-large hiatal hernia. No bowel obstruction. No appreciable bowel inflammation within the limitations of the motion. Moderate-to-large amount of stool within the rectum. The appendix is not visualized.   VESSELS: No aortic aneurysm. Heavy atherosclerotic calcifications of the abdominal aorta and iliac vessels. The portal veins and IVC patent.   PERITONEUM/RETROPERITONEUM: No intraperitoneal free air or fluid.   LYMPH NODES: No enlarged lymph nodes.   ABDOMINAL WALL: Soft tissue defect/wound overlying the left inguinal region with adjacent subcutaneous air and fat stranding.   OSSEOUS STRUCTURES: Status post left total hip arthroplasty. Moderate-to-severe degenerative changes of the right hip.       1. No acute traumatic abnormality identified in the chest, abdomen, or pelvis. 2. Dependent patchy and consolidative opacities in the bilateral lung bases concerning for infectious or inflammatory process, including aspiration. There is also likely a component of volume loss. 3. Enlarged heterogeneous appearance of the left lobe of the thyroid in partially visualized enlarged left cervical lymph node. Follow-up ultrasound recommended for further assessment. 4. Nodular contour of the liver, suggestive of cirrhosis. Correlate with hepatic function tests. 5. Prominent soft tissue defect overlying the left inguinal region/upper thigh with surrounding fat stranding and adjacent air locules. Correlate for recent intervention or evidence of infection.   MACRO: None   Signed by: Jayant Quintero 5/15/2025 8:55 PM Dictation workstation:   [id number]    CT head wo IV  contrast  Result Date: 5/15/2025  Interpreted By:  Jayant Quintero, STUDY: CT HEAD WO IV CONTRAST; CT CERVICAL SPINE WO IV CONTRAST;  5/15/2025 8:13 pm   INDICATION: Signs/Symptoms:fall; Signs/Symptoms:Fall altered mental status     COMPARISON: CT head and cervical spine 03/08/2025   ACCESSION NUMBER(S): [id number]; [id number]   ORDERING CLINICIAN: CARLOS MYRICK   TECHNIQUE: Axial noncontrast CT images of head with coronal and sagittal reconstructed images. Axial noncontrast CT images of the cervical spine with coronal and sagittal reconstructed images.   FINDINGS: CT HEAD:   Motion limits assessment.   BRAIN PARENCHYMA: Generalized cerebral volume loss. Gray-white differentiation is maintained. Patchy periventricular and subcortical white matter hypodensities, nonspecific but often seen in the setting of chronic microangiopathic change. No mass-effect, midline shift or effacement of cerebral sulci.   HEMORRHAGE: No acute intracranial hemorrhage.   VENTRICLES and EXTRA-AXIAL SPACES: The ventricles and sulci are within normal limits for brain volume. No abnormal extra-axial fluid collection.   ORBITS: The visualized orbits and globes are within normal limits.   EXTRACRANIAL SOFT TISSUES: Within normal limits.   PARANASAL SINUSES/MASTOIDS: Scattered opacification of the ethmoid air cells. The mastoid air cells are patent.   CALVARIUM: No definite calvarial fracture identified within the limitations of motion.     CT CERVICAL SPINE:   CRANIOCERVICAL JUNCTION: Intact.   ALIGNMENT: No traumatic malalignment or traumatic facet widening. Mild reversal of the cervical lordotic curvature centered at C3-C4. Degenerative grade 1 retrolisthesis of C5 on C6.   VERTEBRAE/DISC SPACES: No acute fracture. Vertebral body heights are maintained. Moderate-to-severe multilevel disc space height loss and associated degenerative endplate changes, greatest at C3-C4 and C5-C6. Moderate multilevel facet arthrosis bilaterally. No high grade  spinal canal stenosis evident by CT.   SOFT TISSUES: Within normal limits.   OTHER: The visualized lung apices are clear. Enlarged heterogeneous appearance of the left lobe of the thyroid measuring up to 4.8 cm. There is an enlarged lymph node in the upper left jugular chain, inferior to the parotid gland, measuring up to 3 cm (series 7, image 48).       CT HEAD: 1. Assessment limited by motion. 2. No definite acute intracranial abnormality within this limitation.     CT CERVICAL SPINE: 1. No acute fracture or traumatic malalignment of the cervical spine. 2. Moderate-to-severe degenerative changes of the cervical spine. 3. Enlarged heterogeneous appearance of the left lobe of the thyroid. Follow-up ultrasound should be considered for further assessment. 4. Enlarged lymph node in the upper left jugular chain measuring up to 3 cm. Appearance is concerning for malignant/metastatic process.   MACRO: None   Signed by: Jayant Quintero 5/15/2025 8:41 PM Dictation workstation:   [id number]    CT cervical spine wo IV contrast  Result Date: 5/15/2025  Interpreted By:  Jayant Quintero, STUDY: CT HEAD WO IV CONTRAST; CT CERVICAL SPINE WO IV CONTRAST;  5/15/2025 8:13 pm   INDICATION: Signs/Symptoms:fall; Signs/Symptoms:Fall altered mental status     COMPARISON: CT head and cervical spine 03/08/2025   ACCESSION NUMBER(S): [id number]; [id number]   ORDERING CLINICIAN: CARLOS MYRICK   TECHNIQUE: Axial noncontrast CT images of head with coronal and sagittal reconstructed images. Axial noncontrast CT images of the cervical spine with coronal and sagittal reconstructed images.   FINDINGS: CT HEAD:   Motion limits assessment.   BRAIN PARENCHYMA: Generalized cerebral volume loss. Gray-white differentiation is maintained. Patchy periventricular and subcortical white matter hypodensities, nonspecific but often seen in the setting of chronic microangiopathic change. No mass-effect, midline shift or effacement of cerebral sulci.    HEMORRHAGE: No acute intracranial hemorrhage.   VENTRICLES and EXTRA-AXIAL SPACES: The ventricles and sulci are within normal limits for brain volume. No abnormal extra-axial fluid collection.   ORBITS: The visualized orbits and globes are within normal limits.   EXTRACRANIAL SOFT TISSUES: Within normal limits.   PARANASAL SINUSES/MASTOIDS: Scattered opacification of the ethmoid air cells. The mastoid air cells are patent.   CALVARIUM: No definite calvarial fracture identified within the limitations of motion.     CT CERVICAL SPINE:   CRANIOCERVICAL JUNCTION: Intact.   ALIGNMENT: No traumatic malalignment or traumatic facet widening. Mild reversal of the cervical lordotic curvature centered at C3-C4. Degenerative grade 1 retrolisthesis of C5 on C6.   VERTEBRAE/DISC SPACES: No acute fracture. Vertebral body heights are maintained. Moderate-to-severe multilevel disc space height loss and associated degenerative endplate changes, greatest at C3-C4 and C5-C6. Moderate multilevel facet arthrosis bilaterally. No high grade spinal canal stenosis evident by CT.   SOFT TISSUES: Within normal limits.   OTHER: The visualized lung apices are clear. Enlarged heterogeneous appearance of the left lobe of the thyroid measuring up to 4.8 cm. There is an enlarged lymph node in the upper left jugular chain, inferior to the parotid gland, measuring up to 3 cm (series 7, image 48).       CT HEAD: 1. Assessment limited by motion. 2. No definite acute intracranial abnormality within this limitation.     CT CERVICAL SPINE: 1. No acute fracture or traumatic malalignment of the cervical spine. 2. Moderate-to-severe degenerative changes of the cervical spine. 3. Enlarged heterogeneous appearance of the left lobe of the thyroid. Follow-up ultrasound should be considered for further assessment. 4. Enlarged lymph node in the upper left jugular chain measuring up to 3 cm. Appearance is concerning for malignant/metastatic process.   MACRO: None    Signed by: Jayant Quintero 5/15/2025 8:41 PM Dictation workstation:   [id number]     Assessment/Plan   Encephalopathy, toxic metabolic  Proteus urinary tract infection  Postop left hip wound infection, recent culture grew Proteus  Chronic kidney disease stage III  Dementia  Positive blood culture-Enterococcus, Staphylococcus epidermidis    IV vancomycin  IV Zosyn  Follow-up wound culture  Follow-up final blood culture result  Monitor renal function  Local care  Further recommendations based on culture results  Monitor temperature and WBC  Supportive care    This is a complex infectious disease issue and the following was performed today (for more details please see the above note): Management decisions reflecting the added complexity (e.g., changes in antimicrobial therapy, infection control strategies).       Vj Montes De Oca MD         [1] No family history on file.  [2]   Medications Prior to Admission   Medication Sig Dispense Refill Last Dose/Taking    atorvastatin (Lipitor) 40 mg tablet Take 1 tablet (40 mg) by mouth once daily at bedtime.   Past Week    [] doxycycline (Vibramycin) 100 mg capsule Take 1 capsule (100 mg) by mouth 2 times a day for 10 days. Take with at least 8 ounces (large glass) of water, do not lie down for 30 minutes after 20 capsule 0 Past Week    finasteride (Proscar) 5 mg tablet Take 1 tablet (5 mg) by mouth once daily.   Past Week    FLUoxetine (PROzac) 10 mg capsule Take 1 capsule (10 mg) by mouth once daily.   Past Week    furosemide (Lasix) 20 mg tablet Take 1 tablet (20 mg) by mouth once daily.   Past Week    oxyCODONE (Roxicodone) 5 mg immediate release tablet Take 1 tablet (5 mg) by mouth every 6 hours if needed for severe pain (7 - 10).   Past Week    pantoprazole (ProtoNix) 40 mg EC tablet Take 1 tablet (40 mg) by mouth once daily in the morning. Take before meals. Do not crush, chew, or split.   Past Week    potassium chloride CR 10 mEq ER tablet Take 1 tablet (10  mEq) by mouth once daily.   Past Week    sennosides-docusate sodium (Sarah-Colace) 8.6-50 mg tablet Take 2 tablets by mouth once daily at bedtime.   Past Week    tamsulosin (Flomax) 0.4 mg 24 hr capsule Take 1 capsule (0.4 mg) by mouth once daily.   Past Week    traZODone (Desyrel) 50 mg tablet Take 1 tablet (50 mg) by mouth once daily at bedtime.   Past Week    furosemide (Lasix) 40 mg tablet Take 0.5 tablets (20 mg) by mouth once daily. (Patient not taking: Reported on 5/15/2025)   Not Taking    heparin sodium,porcine (heparin, porcine,) 5,000 unit/mL injection Inject 1 mL (5,000 Units) under the skin every 8 hours. For one month until mobile or cleared by orthopedic surgery (Patient not taking: Reported on 5/15/2025)   Not Taking    zinc oxide 40 % ointment ointment Apply 1 Application topically 2 times a day. Apply to groin and scrotum   Unknown   [3] atorvastatin, 40 mg, oral, Nightly  finasteride, 5 mg, oral, Daily  FLUoxetine, 10 mg, oral, Daily  furosemide, 20 mg, oral, Daily  heparin (porcine), 5,000 Units, subcutaneous, q8h YARELIS  pantoprazole, 40 mg, oral, Daily before breakfast  piperacillin-tazobactam, 2.25 g, intravenous, q6h  potassium chloride CR, 10 mEq, oral, Daily with breakfast  sennosides-docusate sodium, 2 tablet, oral, Nightly  tamsulosin, 0.4 mg, oral, Daily  traZODone, 50 mg, oral, Nightly  zinc oxide, 1 Application, Topical, BID    [4]    [5] PRN medications: acetaminophen **OR** acetaminophen **OR** acetaminophen, benzocaine-menthol, dextromethorphan-guaifenesin, guaiFENesin, ipratropium-albuteroL, melatonin, OLANZapine, ondansetron ODT **OR** ondansetron, oxyCODONE, polyethylene glycol

## 2025-05-17 NOTE — CONSULTS
Reason For Consult  Abnormal CT findings    History Of Present Illness  Jabari Dumas is a 84 y.o. male presenting with UTI. He has been dealing with recurrent UTIs for a few months. He lives in a nursing home due to dementia and the day of admission had become combative which is very unlike him. He was sent to the ER where he underwent CT of the chest abdomen and pelvis. The chest CT showed bibasilar opacities and bronchiectasis. Pulmonary was consulted for possible pneumonia based on the CT findings. He has had no cough or shortness of breath. He is currently on room air which is his baseline. He was a smoker but quit many years ago.       Past Medical History  He has a past medical history of Gastrointestinal hemorrhage (03/08/2025).    Surgical History  He has a past surgical history that includes CT guided imaging for needle placement (12/7/2017).     Social History  He reports that he has quit smoking. His smoking use included cigarettes. He does not have any smokeless tobacco history on file. He reports that he does not currently use alcohol. No history on file for drug use.    Family History  No family history on file.     Allergies  Patient has no known allergies.    Review of Systems  As per HPI     Physical Exam  GENERAL APPEARANCE: Elderly man, sitting up in bed, watching golf, sunglasses on his forehead, in no acute distress  SKIN: no rashes  HEAD, EYES, EARS, NOSE, THROAT: Without sinus tenderness. Conjunctiva and sclera clear. No rhinitis, nasal mucosa normal without polyps. Oropharynx unremarkable  NECK: No lymphadenopathy  CHEST: AP Diameter normal. No retractions. Auscultation reveals good air exchange without crackles or wheeze  HEART: Normal rhythm, without murmur  ABDOMEN: Not distended. Normal bowel sounds. Soft and non-tender to palpation, without hepatomegaly or splenomegaly. No masses  EXTREMITIES: Without cyanosis. No clubbing.  LYMPHATIC: No lymphadenopathy  MUSCULOSKELETAL: Unremarkable  "  NEUROLOGIC: Grossly intact       Last Recorded Vitals  Blood pressure 125/80, pulse 73, temperature 36.9 °C (98.4 °F), temperature source Temporal, resp. rate 19, height 1.803 m (5' 11\"), weight 69.2 kg (152 lb 8.9 oz), SpO2 97%.    Relevant Results  CT C/A/P 5/15  IMPRESSION:  1. No acute traumatic abnormality identified in the chest, abdomen,  or pelvis.  2. Dependent patchy and consolidative opacities in the bilateral lung  bases concerning for infectious or inflammatory process, including  aspiration. There is also likely a component of volume loss.  3. Enlarged heterogeneous appearance of the left lobe of the thyroid  in partially visualized enlarged left cervical lymph node. Follow-up  ultrasound recommended for further assessment.  4. Nodular contour of the liver, suggestive of cirrhosis. Correlate  with hepatic function tests.  5. Prominent soft tissue defect overlying the left inguinal  region/upper thigh with surrounding fat stranding and adjacent air  locules. Correlate for recent intervention or evidence of infection.    Blood cultures positive for E. Faecalis, S. Epidermatis and S. Capitis  Urine cx positive for proteus     Assessment/Plan     85 y/o man with dementia and recurrent UTIs with urine culture growing proteus. Blood cultures positive for multiple organisms. Pulmonary was consulted over concern for possible pneumonia.     I think the findings on chest CT are most likely chronic changes from chronic atelectasis and possible aspiration. They are present (although the infiltrate is not as dense) on CT scans going to 2017. Pt does not currently have any cough or shortness of breath. He is breathing very comfortably on room air. Consider speech consult to evaluate for aspiration. Incentive spirometry to help avoid/improve atelectasis if able to participate. Pulmonary will sign off. Please reach out with any questions or concerns.    I spent 45 minutes in the professional and overall care of this " patient.      Kaylee Morin MD PhD

## 2025-05-17 NOTE — CONSULTS
Vancomycin Dosing by Pharmacy- INITIAL    Jabari Dumas is a 84 y.o. year old male who Pharmacy has been consulted for vancomycin dosing for other (BSI). Based on the patient's indication and renal status this patient will be dosed based on a goal AUC of 400-600.     Renal function is currently stable.    Visit Vitals  /54 (BP Location: Right arm, Patient Position: Lying)   Pulse 62   Temp 36.5 °C (97.7 °F) (Temporal)   Resp 16        Lab Results   Component Value Date    CREATININE 1.38 (H) 2025    CREATININE 1.65 (H) 05/15/2025    CREATININE 1.26 2025    CREATININE 1.48 (H) 2025        Patient weight is as follows:   Vitals:    05/15/25 1401   Weight: 69.2 kg (152 lb 8.9 oz)       Cultures:  Susceptibility data for the encounter in last 14 days.  Collected Specimen Info Organism   05/15/25 Urine from Clean Catch/Voided Proteus mirabilis         I/O last 3 completed shifts:  In: 1750 (25.3 mL/kg) [P.O.:1200; IV Piggyback:550]  Out: 951 (13.7 mL/kg) [Urine:951 (0.4 mL/kg/hr)]  Weight: 69.2 kg   I/O during current shift:  I/O this shift:  In: 500 [P.O.:400; IV Piggyback:100]  Out: 125 [Urine:125]    Temp (24hrs), Av.8 °C (98.2 °F), Min:36.5 °C (97.7 °F), Max:37.3 °C (99.1 °F)         Assessment/Plan     Patient will not be given a loading dose.  Will initiate vancomycin maintenance, 1000 mg every 24 hours.    This dosing regimen is predicted by [email or web address] to result in the following pharmacokinetic parameters:  Loading dose: N/A  Regimen: 1000 mg IV every 24 hours.  Start time: 13:24 on 2025  Exposure target: AUC24 (range)400-600 mg/L.hr   [id number]: 330 mg/L.hr  AUC24,ss: 517 mg/L.hr  Probability of AUC24 > 400: 79 %  Ctrough,ss: 16.6 mg/L  Probability of Ctrough,ss > 20: 31 %      Follow-up level will be ordered on  at 0500 unless clinically indicated sooner.  Will continue to monitor renal function daily while on vancomycin and order serum creatinine at least every 48 hours if  not already ordered.  Follow for continued vancomycin needs, clinical response, and signs/symptoms of toxicity.       CURT PAYNE

## 2025-05-17 NOTE — CARE PLAN
The patient's goals for the shift include      The clinical goals for the shift include patient will remain safe and free from falls or harm throughout the shift      Problem: Safety - Adult  Goal: Free from fall injury  Outcome: Progressing     Problem: Discharge Planning  Goal: Discharge to home or other facility with appropriate resources  Outcome: Progressing     Problem: Chronic Conditions and Co-morbidities  Goal: Patient's chronic conditions and co-morbidity symptoms are monitored and maintained or improved  Outcome: Progressing     Problem: Nutrition  Goal: Nutrient intake appropriate for maintaining nutritional needs  Outcome: Progressing     Problem: Fall/Injury  Goal: Not fall by end of shift  Outcome: Progressing  Goal: Be free from injury by end of the shift  Outcome: Progressing  Goal: Verbalize understanding of personal risk factors for fall in the hospital  Outcome: Progressing  Goal: Verbalize understanding of risk factor reduction measures to prevent injury from fall in the home  Outcome: Progressing  Goal: Use assistive devices by end of the shift  Outcome: Progressing  Goal: Pace activities to prevent fatigue by end of the shift  Outcome: Progressing     Problem: Deep Vein Thrombosis  Goal: I will remain free from complications of deep vein thrombosis and maintain current level of mobility  Outcome: Progressing     Problem: Skin  Goal: Decreased wound size/increased tissue granulation at next dressing change  Outcome: Progressing  Flowsheets (Taken 5/16/2025 2339)  Decreased wound size/increased tissue granulation at next dressing change:   Protective dressings over bony prominences   Promote sleep for wound healing  Goal: Participates in plan/prevention/treatment measures  Outcome: Progressing  Flowsheets (Taken 5/16/2025 2333)  Participates in plan/prevention/treatment measures:   Discuss with provider PT/OT consult   Elevate heels  Goal: Prevent/manage excess moisture  Outcome:  Progressing  Flowsheets (Taken 5/16/2025 2334)  Prevent/manage excess moisture:   Monitor for/manage infection if present   Moisturize dry skin  Goal: Prevent/minimize sheer/friction injuries  Outcome: Progressing  Flowsheets (Taken 5/16/2025 2334)  Prevent/minimize sheer/friction injuries:   Use pull sheet   Complete micro-shifts as needed if patient unable. Adjust patient position to relieve pressure points, not a full turn   HOB 30 degrees or less   Utilize specialty bed per algorithm  Goal: Promote/optimize nutrition  Outcome: Progressing  Flowsheets (Taken 5/16/2025 2334)  Promote/optimize nutrition:   Monitor/record intake including meals   Consume > 50% meals/supplements   Offer water/supplements/favorite foods   Assist with feeding  Goal: Promote skin healing  Outcome: Progressing  Flowsheets (Taken 5/16/2025 2334)  Promote skin healing:   Protective dressings over bony prominences   Assess skin/pad under line(s)/device(s)     Problem: Pain  Goal: Takes deep breaths with improved pain control throughout the shift  Outcome: Progressing     Problem: Pain  Goal: Takes deep breaths with improved pain control throughout the shift  Outcome: Progressing  Goal: Turns in bed with improved pain control throughout the shift  Outcome: Progressing  Goal: Walks with improved pain control throughout the shift  Outcome: Progressing  Goal: Performs ADL's with improved pain control throughout shift  Outcome: Progressing  Goal: Participates in PT with improved pain control throughout the shift  Outcome: Progressing  Goal: Free from opioid side effects throughout the shift  Outcome: Progressing  Goal: Free from acute confusion related to pain meds throughout the shift  Outcome: Progressing

## 2025-05-17 NOTE — CARE PLAN
The patient's goals for the shift include      The clinical goals for the shift include Safety maintained, nutritoin and hydration encouraged      Problem: Safety - Adult  Goal: Free from fall injury  Outcome: Progressing     Problem: Discharge Planning  Goal: Discharge to home or other facility with appropriate resources  Outcome: Progressing     Problem: Chronic Conditions and Co-morbidities  Goal: Patient's chronic conditions and co-morbidity symptoms are monitored and maintained or improved  Outcome: Progressing     Problem: Nutrition  Goal: Nutrient intake appropriate for maintaining nutritional needs  Outcome: Progressing     Problem: Fall/Injury  Goal: Not fall by end of shift  Outcome: Progressing  Goal: Be free from injury by end of the shift  Outcome: Progressing  Goal: Verbalize understanding of personal risk factors for fall in the hospital  Outcome: Progressing  Goal: Verbalize understanding of risk factor reduction measures to prevent injury from fall in the home  Outcome: Progressing  Goal: Use assistive devices by end of the shift  Outcome: Progressing  Goal: Pace activities to prevent fatigue by end of the shift  Outcome: Progressing     Problem: Deep Vein Thrombosis  Goal: I will remain free from complications of deep vein thrombosis and maintain current level of mobility  Outcome: Progressing     Problem: Skin  Goal: Decreased wound size/increased tissue granulation at next dressing change  Outcome: Progressing  Flowsheets (Taken 5/17/2025 0751)  Decreased wound size/increased tissue granulation at next dressing change: Promote sleep for wound healing  Goal: Participates in plan/prevention/treatment measures  Outcome: Progressing  Flowsheets (Taken 5/17/2025 0751)  Participates in plan/prevention/treatment measures: Discuss with provider PT/OT consult  Goal: Prevent/manage excess moisture  Outcome: Progressing  Flowsheets (Taken 5/17/2025 0751)  Prevent/manage excess moisture: Monitor for/manage  infection if present  Goal: Prevent/minimize sheer/friction injuries  Outcome: Progressing  Flowsheets (Taken 5/17/2025 0751)  Prevent/minimize sheer/friction injuries: Increase activity/out of bed for meals  Goal: Promote/optimize nutrition  Outcome: Progressing  Flowsheets (Taken 5/17/2025 0751)  Promote/optimize nutrition: Monitor/record intake including meals  Goal: Promote skin healing  Outcome: Progressing  Flowsheets (Taken 5/17/2025 0751)  Promote skin healing:   Protective dressings over bony prominences   Turn/reposition every 2 hours/use positioning/transfer devices     Problem: Pain  Goal: Takes deep breaths with improved pain control throughout the shift  Outcome: Progressing  Goal: Turns in bed with improved pain control throughout the shift  Outcome: Progressing  Goal: Walks with improved pain control throughout the shift  Outcome: Progressing  Goal: Performs ADL's with improved pain control throughout shift  Outcome: Progressing  Goal: Participates in PT with improved pain control throughout the shift  Outcome: Progressing  Goal: Free from opioid side effects throughout the shift  Outcome: Progressing  Goal: Free from acute confusion related to pain meds throughout the shift  Outcome: Progressing

## 2025-05-18 LAB
ALBUMIN SERPL BCP-MCNC: 2.8 G/DL (ref 3.4–5)
ALP SERPL-CCNC: 94 U/L (ref 33–136)
ALT SERPL W P-5'-P-CCNC: 8 U/L (ref 10–52)
ANION GAP SERPL CALCULATED.3IONS-SCNC: 10 MMOL/L (ref 10–20)
AST SERPL W P-5'-P-CCNC: 11 U/L (ref 9–39)
BACTERIA BLD AEROBE CULT: ABNORMAL
BACTERIA BLD CULT: ABNORMAL
BACTERIA SPEC CULT: NORMAL
BACTERIA UR CULT: ABNORMAL
BASOPHILS # BLD AUTO: 0.02 X10*3/UL (ref 0–0.1)
BASOPHILS NFR BLD AUTO: 0.3 %
BILIRUB SERPL-MCNC: 0.5 MG/DL (ref 0–1.2)
BUN SERPL-MCNC: 16 MG/DL (ref 6–23)
CALCIUM SERPL-MCNC: 8.6 MG/DL (ref 8.6–10.3)
CHLORIDE SERPL-SCNC: 109 MMOL/L (ref 98–107)
CO2 SERPL-SCNC: 26 MMOL/L (ref 21–32)
CREAT SERPL-MCNC: 1.42 MG/DL (ref 0.5–1.3)
EGFRCR SERPLBLD CKD-EPI 2021: 48 ML/MIN/1.73M*2
EOSINOPHIL # BLD AUTO: 0.25 X10*3/UL (ref 0–0.4)
EOSINOPHIL NFR BLD AUTO: 4.3 %
ERYTHROCYTE [DISTWIDTH] IN BLOOD BY AUTOMATED COUNT: 15.4 % (ref 11.5–14.5)
GLUCOSE SERPL-MCNC: 84 MG/DL (ref 74–99)
GRAM STN SPEC: ABNORMAL
GRAM STN SPEC: NORMAL
GRAM STN SPEC: NORMAL
HCT VFR BLD AUTO: 35.6 % (ref 41–52)
HGB BLD-MCNC: 11.1 G/DL (ref 13.5–17.5)
IMM GRANULOCYTES # BLD AUTO: 0.03 X10*3/UL (ref 0–0.5)
IMM GRANULOCYTES NFR BLD AUTO: 0.5 % (ref 0–0.9)
LYMPHOCYTES # BLD AUTO: 1.06 X10*3/UL (ref 0.8–3)
LYMPHOCYTES NFR BLD AUTO: 18.2 %
MCH RBC QN AUTO: 27.5 PG (ref 26–34)
MCHC RBC AUTO-ENTMCNC: 31.2 G/DL (ref 32–36)
MCV RBC AUTO: 88 FL (ref 80–100)
MONOCYTES # BLD AUTO: 0.57 X10*3/UL (ref 0.05–0.8)
MONOCYTES NFR BLD AUTO: 9.8 %
NEUTROPHILS # BLD AUTO: 3.91 X10*3/UL (ref 1.6–5.5)
NEUTROPHILS NFR BLD AUTO: 66.9 %
NRBC BLD-RTO: 0 /100 WBCS (ref 0–0)
PLATELET # BLD AUTO: 236 X10*3/UL (ref 150–450)
POTASSIUM SERPL-SCNC: 3.7 MMOL/L (ref 3.5–5.3)
PROT SERPL-MCNC: 5.4 G/DL (ref 6.4–8.2)
RBC # BLD AUTO: 4.04 X10*6/UL (ref 4.5–5.9)
SODIUM SERPL-SCNC: 141 MMOL/L (ref 136–145)
VANCOMYCIN SERPL-MCNC: 9 UG/ML (ref 5–20)
WBC # BLD AUTO: 5.8 X10*3/UL (ref 4.4–11.3)

## 2025-05-18 PROCEDURE — 80202 ASSAY OF VANCOMYCIN: CPT

## 2025-05-18 PROCEDURE — 1100000001 HC PRIVATE ROOM DAILY

## 2025-05-18 PROCEDURE — 2500000004 HC RX 250 GENERAL PHARMACY W/ HCPCS (ALT 636 FOR OP/ED)

## 2025-05-18 PROCEDURE — 85025 COMPLETE CBC W/AUTO DIFF WBC: CPT | Performed by: INTERNAL MEDICINE

## 2025-05-18 PROCEDURE — 36415 COLL VENOUS BLD VENIPUNCTURE: CPT | Performed by: INTERNAL MEDICINE

## 2025-05-18 PROCEDURE — 2500000002 HC RX 250 W HCPCS SELF ADMINISTERED DRUGS (ALT 637 FOR MEDICARE OP, ALT 636 FOR OP/ED): Performed by: INTERNAL MEDICINE

## 2025-05-18 PROCEDURE — 2500000001 HC RX 250 WO HCPCS SELF ADMINISTERED DRUGS (ALT 637 FOR MEDICARE OP): Performed by: INTERNAL MEDICINE

## 2025-05-18 PROCEDURE — 87075 CULTR BACTERIA EXCEPT BLOOD: CPT | Mod: TRILAB | Performed by: INTERNAL MEDICINE

## 2025-05-18 PROCEDURE — 80053 COMPREHEN METABOLIC PANEL: CPT | Performed by: INTERNAL MEDICINE

## 2025-05-18 PROCEDURE — 2500000004 HC RX 250 GENERAL PHARMACY W/ HCPCS (ALT 636 FOR OP/ED): Performed by: INTERNAL MEDICINE

## 2025-05-18 RX ADMIN — Medication 1 APPLICATION: at 20:01

## 2025-05-18 RX ADMIN — VANCOMYCIN 1000 MG: 1 INJECTION, SOLUTION INTRAVENOUS at 14:58

## 2025-05-18 RX ADMIN — FUROSEMIDE 20 MG: 20 TABLET ORAL at 10:32

## 2025-05-18 RX ADMIN — OLANZAPINE 2.5 MG: 10 INJECTION, POWDER, FOR SOLUTION INTRAMUSCULAR at 19:49

## 2025-05-18 RX ADMIN — Medication 1 APPLICATION: at 10:39

## 2025-05-18 RX ADMIN — TRAZODONE HYDROCHLORIDE 50 MG: 50 TABLET ORAL at 20:00

## 2025-05-18 RX ADMIN — PIPERACILLIN SODIUM AND TAZOBACTAM SODIUM 2.25 G: 2; .25 INJECTION, SOLUTION INTRAVENOUS at 18:38

## 2025-05-18 RX ADMIN — ATORVASTATIN CALCIUM 40 MG: 40 TABLET, FILM COATED ORAL at 20:00

## 2025-05-18 RX ADMIN — HEPARIN SODIUM 5000 UNITS: 5000 INJECTION INTRAVENOUS; SUBCUTANEOUS at 21:35

## 2025-05-18 RX ADMIN — FLUOXETINE HYDROCHLORIDE 10 MG: 10 CAPSULE ORAL at 10:32

## 2025-05-18 RX ADMIN — TAMSULOSIN HYDROCHLORIDE 0.4 MG: 0.4 CAPSULE ORAL at 10:32

## 2025-05-18 RX ADMIN — PIPERACILLIN SODIUM AND TAZOBACTAM SODIUM 2.25 G: 2; .25 INJECTION, SOLUTION INTRAVENOUS at 12:27

## 2025-05-18 RX ADMIN — POTASSIUM CHLORIDE 10 MEQ: 750 TABLET, EXTENDED RELEASE ORAL at 10:33

## 2025-05-18 RX ADMIN — HEPARIN SODIUM 5000 UNITS: 5000 INJECTION INTRAVENOUS; SUBCUTANEOUS at 14:31

## 2025-05-18 RX ADMIN — SENNOSIDES AND DOCUSATE SODIUM 2 TABLET: 50; 8.6 TABLET ORAL at 20:00

## 2025-05-18 RX ADMIN — PIPERACILLIN SODIUM AND TAZOBACTAM SODIUM 2.25 G: 2; .25 INJECTION, SOLUTION INTRAVENOUS at 05:12

## 2025-05-18 RX ADMIN — PANTOPRAZOLE SODIUM 40 MG: 40 TABLET, DELAYED RELEASE ORAL at 05:12

## 2025-05-18 RX ADMIN — PIPERACILLIN SODIUM AND TAZOBACTAM SODIUM 2.25 G: 2; .25 INJECTION, SOLUTION INTRAVENOUS at 01:24

## 2025-05-18 RX ADMIN — FINASTERIDE 5 MG: 5 TABLET, FILM COATED ORAL at 10:32

## 2025-05-18 RX ADMIN — HEPARIN SODIUM 5000 UNITS: 5000 INJECTION INTRAVENOUS; SUBCUTANEOUS at 05:11

## 2025-05-18 ASSESSMENT — COGNITIVE AND FUNCTIONAL STATUS - GENERAL
TURNING FROM BACK TO SIDE WHILE IN FLAT BAD: A LOT
HELP NEEDED FOR BATHING: TOTAL
DRESSING REGULAR UPPER BODY CLOTHING: TOTAL
MOVING TO AND FROM BED TO CHAIR: TOTAL
MOVING TO AND FROM BED TO CHAIR: TOTAL
EATING MEALS: TOTAL
CLIMB 3 TO 5 STEPS WITH RAILING: TOTAL
DRESSING REGULAR LOWER BODY CLOTHING: TOTAL
DRESSING REGULAR UPPER BODY CLOTHING: TOTAL
TOILETING: TOTAL
TURNING FROM BACK TO SIDE WHILE IN FLAT BAD: A LOT
PERSONAL GROOMING: TOTAL
DRESSING REGULAR LOWER BODY CLOTHING: TOTAL
WALKING IN HOSPITAL ROOM: TOTAL
STANDING UP FROM CHAIR USING ARMS: TOTAL
DAILY ACTIVITIY SCORE: 6
PERSONAL GROOMING: TOTAL
DAILY ACTIVITIY SCORE: 6
MOVING FROM LYING ON BACK TO SITTING ON SIDE OF FLAT BED WITH BEDRAILS: A LITTLE
MOBILITY SCORE: 9
TOILETING: TOTAL
EATING MEALS: TOTAL
HELP NEEDED FOR BATHING: TOTAL
WALKING IN HOSPITAL ROOM: TOTAL
CLIMB 3 TO 5 STEPS WITH RAILING: TOTAL
STANDING UP FROM CHAIR USING ARMS: TOTAL

## 2025-05-18 ASSESSMENT — PAIN SCALES - GENERAL
PAINLEVEL_OUTOF10: 0 - NO PAIN
PAINLEVEL_OUTOF10: 0 - NO PAIN

## 2025-05-18 NOTE — PROGRESS NOTES
Jabari Dumas is a 84 y.o. male on day 3 of admission presenting with Acute kidney injury.    Subjective   The patient was seen and examined.  More cooperative.  Movement today.  Denies any headache or dizziness.  No nausea vomiting.  No cough or expectoration.       Objective     Physical Exam  HEENT:  Head externally atraumatic,  extraocular movements intact, oral mucosa moist  Neck:  Supple, no JVP, no palpable adenopathy or thyromegaly.  No carotid bruit.  Chest:  Clear to auscultation and resonant.  Heart:  Regular rate and rhythm, no murmur or gallop could be appreciated.  Abdomen:  Soft, nontender, bowel sounds present, normoactive, no palpable hepatosplenomegaly.  Extremities:  No edema, pulses present, no cyanosis or clubbing.  CNS:  Patient alert, orientedx 2, confused and has cognitive impairment.   No new deficit.  Cranial nerves 2-12 grossly intact  Skin:  No active rash.  Musculoskeletal:  No  apparent joint swelling or erythema, range of movement normal.  Last Recorded Vitals  Heart Rate:  [62-78]   Temp:  [36.5 °C (97.7 °F)-36.9 °C (98.4 °F)]   Resp:  [16-19]   BP: ()/(54-80)   SpO2:  [95 %-97 %]     Intake/Output last 3 Shifts:  I/O last 3 completed shifts:  In: 2140 (30.9 mL/kg) [P.O.:1640; IV Piggyback:500]  Out: 1025 (14.8 mL/kg) [Urine:1025 (0.4 mL/kg/hr)]  Weight: 69.2 kg     Relevant Results  Susceptibility data from last 90 days.  Collected Specimen Info Organism Amoxicillin/Clavulanate Ampicillin Ampicillin/Sulbactam Cefazolin Cefazolin (uncomplicated UTIs only) Ceftriaxone Ciprofloxacin Clindamycin Erythromycin Gentamicin Nitrofurantoin Oxacillin   05/15/25 Blood culture from Peripheral Venipuncture Staphylococcus epidermidis               05/15/25 Blood culture from Peripheral Venipuncture Staphylococcus capitis                 Enterococcus faecalis               05/15/25 Urine from Clean Catch/Voided Proteus mirabilis               03/09/25 Fluid from SPUTUM Methicillin  Susceptible Staphylococcus aureus (MSSA)         S  S    S   03/08/25 Urine from Clean Catch/Voided Escherichia coli  S  R  I  I  S  S  S    R  S      Collected Specimen Info Organism Piperacillin/Tazobactam Tetracycline Tobramycin Trimethoprim/Sulfamethoxazole Vancomycin   05/15/25 Blood culture from Peripheral Venipuncture Staphylococcus epidermidis        05/15/25 Blood culture from Peripheral Venipuncture Staphylococcus capitis          Enterococcus faecalis        05/15/25 Urine from Clean Catch/Voided Proteus mirabilis        03/09/25 Fluid from SPUTUM Methicillin Susceptible Staphylococcus aureus (MSSA)   S   S  S   03/08/25 Urine from Clean Catch/Voided Escherichia coli  S   I  S      No results found for this or any previous visit (from the past 24 hours).   Current Medications[1]   Assessment/Plan   Assessment & Plan  Acute kidney injury    Altered mental status    Acute cystitis with hematuria    Open wound of left hip    Abnormal CT scan  Hiatal hernia  Cyclic vomiting  Cirrhosis of the liver on CAT scan  Bilateral pneumonia  Possible aspiration pneumonia  Dementia  Chronic kidney disease stage IIIb  Constipation  UTI with Proteus Mirabella's.  Bacteremia with Staphylococcus epidermidis and Staphylococcus capitis and Enterococcus faecalis.  Plan: Continue current medication.  Supportive care.  Patient was started on broad-spectrum IV antibiotics.  For pneumonia or UTI.  Check urine culture, blood culture and sputum culture.  Give aerosol treatment.  Monitor pulse ox.  Local wound care for the left inguinal wound.  Consult wound care.  Consult ID and pulmonary.  Monitor renal function panel.  Monitor electrolytes and replete as needed.  Patient mild hypokalemia.  Patient had diarrhea.  Continue the present OmniHIB dose.  Check ammonia level.  Patient anxiety and agitation.  Patient does not aggressive towards the staff normal.  Start patient on Zyprexa as needed.  Medication reviewed and ordered.  We will  take Dupee, fall, aspiration, decubitus, and DVT precautions    Date of service: 5/18/2025  Plan: Continue current medication.  Continue with IV antibiotics.  Supportive care.  ID is on consult.  Cardiology and ID input appreciated.  Good supportive care.  Monitor CBC and BMP.  Increase activity.  We will take DVT, fall, aspiration, decubitus, DVT precaution.  Check 2D echo.           Ramon Ramos MD           [1]   Current Facility-Administered Medications:     acetaminophen (Tylenol) tablet 650 mg, 650 mg, oral, q4h PRN, 650 mg at 05/17/25 1204 **OR** acetaminophen (Tylenol) oral liquid 650 mg, 650 mg, oral, q4h PRN, 650 mg at 05/17/25 0615 **OR** acetaminophen (Tylenol) suppository 650 mg, 650 mg, rectal, q4h PRN, Ramon Ramos MD    atorvastatin (Lipitor) tablet 40 mg, 40 mg, oral, Nightly, Ramon Ramos MD, 40 mg at 05/17/25 2030    benzocaine-menthol (Cepastat Sore Throat) lozenge 1 lozenge, 1 lozenge, Mouth/Throat, q2h PRN, Ramon Ramos MD    dextromethorphan-guaifenesin (Robitussin DM)  mg/5 mL oral liquid 5 mL, 5 mL, oral, q4h PRN, Ramon Ramos MD    finasteride (Proscar) tablet 5 mg, 5 mg, oral, Daily, Ramon Ramos MD, 5 mg at 05/17/25 0849    FLUoxetine (PROzac) capsule 10 mg, 10 mg, oral, Daily, Ramon Ramos MD, 10 mg at 05/17/25 0848    furosemide (Lasix) tablet 20 mg, 20 mg, oral, Daily, Ramon Ramos MD, 20 mg at 05/17/25 0849    guaiFENesin (Mucinex) 12 hr tablet 600 mg, 600 mg, oral, q12h PRN, Ramon Ramos MD    heparin (porcine) injection 5,000 Units, 5,000 Units, subcutaneous, q8h YARELIS, Ramon Ramos MD, 5,000 Units at 05/17/25 2034    ipratropium-albuteroL (Duo-Neb) 0.5-2.5 mg/3 mL nebulizer solution 3 mL, 3 mL, nebulization, q4h PRN, Ramon Ramos MD    melatonin tablet 6 mg, 6 mg, oral, Nightly PRN, Ramon Ramos MD, 6 mg at 05/17/25 2030    OLANZapine (ZyPREXA) injection 2.5 mg, 2.5 mg, intramuscular, q4h PRN,  Ramon Ramos MD, 2.5 mg at 05/16/25 1936    ondansetron ODT (Zofran-ODT) disintegrating tablet 4 mg, 4 mg, oral, q8h PRN **OR** ondansetron (Zofran) injection 4 mg, 4 mg, intravenous, q8h PRN, Ramon Ramos MD    oxyCODONE (Roxicodone) immediate release tablet 5 mg, 5 mg, oral, q6h PRN, Ramon Ramos MD    pantoprazole (ProtoNix) EC tablet 40 mg, 40 mg, oral, Daily before breakfast, Ramon Ramos MD, 40 mg at 05/17/25 0615    piperacillin-tazobactam (Zosyn) 2.25 g in dextrose (iso) IV 50 mL, 2.25 g, intravenous, q6h, Ramon Ramos MD, Stopped at 05/17/25 1941    polyethylene glycol (Glycolax, Miralax) packet 17 g, 17 g, oral, Daily PRN, Ramon Ramos MD    potassium chloride CR (Klor-Con) ER tablet 10 mEq, 10 mEq, oral, Daily with breakfast, Ramon Ramos MD, 10 mEq at 05/17/25 0849    sennosides-docusate sodium (Sarah-Colace) 8.6-50 mg per tablet 2 tablet, 2 tablet, oral, Nightly, Ramon Ramos MD, 2 tablet at 05/17/25 2031    tamsulosin (Flomax) 24 hr capsule 0.4 mg, 0.4 mg, oral, Daily, Ramon Ramos MD, 0.4 mg at 05/17/25 0849    traZODone (Desyrel) tablet 50 mg, 50 mg, oral, Nightly, Ramon Ramos MD, 50 mg at 05/17/25 2031    vancomycin (Vancocin) pharmacy to dose - pharmacy monitoring, , miscellaneous, Daily PRN, Vj Montes De Oca MD    vancomycin (Xellia) 1,000 mg in diluent combination  mL, 1,000 mg, intravenous, q24h, Tacho Espinal, Stopped at 05/17/25 1435    zinc oxide 40 % ointment 1 Application, 1 Application, Topical, BID, Ramon Ramos MD, 1 Application at 05/17/25 2035

## 2025-05-18 NOTE — ASSESSMENT & PLAN NOTE
Hiatal hernia  Cyclic vomiting  Cirrhosis of the liver on CAT scan  Bilateral pneumonia  Possible aspiration pneumonia  Dementia  Chronic kidney disease stage IIIb  Constipation  UTI with Proteus Mirabella's.  Bacteremia with Staphylococcus epidermidis and Staphylococcus capitis and Enterococcus faecalis.  Plan: Continue current medication.  Supportive care.  Patient was started on broad-spectrum IV antibiotics.  For pneumonia or UTI.  Check urine culture, blood culture and sputum culture.  Give aerosol treatment.  Monitor pulse ox.  Local wound care for the left inguinal wound.  Consult wound care.  Consult ID and pulmonary.  Monitor renal function panel.  Monitor electrolytes and replete as needed.  Patient mild hypokalemia.  Patient had diarrhea.  Continue the present OmniHIB dose.  Check ammonia level.  Patient anxiety and agitation.  Patient does not aggressive towards the staff normal.  Start patient on Zyprexa as needed.  Medication reviewed and ordered.  We will take Dupee, fall, aspiration, decubitus, and DVT precautions    Date of service: 5/18/2025  Plan: Continue current medication.  Continue with IV antibiotics.  Supportive care.  ID is on consult.  Cardiology and ID input appreciated.  Good supportive care.  Monitor CBC and BMP.  Increase activity.  We will take DVT, fall, aspiration, decubitus, DVT precaution.  Check 2D echo.

## 2025-05-18 NOTE — PROGRESS NOTES
Patient has significant swelling to right forearm. Appears to be an infiltrated IV. IV removed at this time. Arm wrapped with gauze to help decrease swelling. New IV placed in left forearm.     03:15 patient found with IV pulled out. New IV placed in right arm above infiltrated site and wrapped to prevent patient pulling it out.

## 2025-05-18 NOTE — CARE PLAN
Problem: Safety - Adult  Goal: Free from fall injury  Outcome: Progressing     Problem: Discharge Planning  Goal: Discharge to home or other facility with appropriate resources  Outcome: Progressing     Problem: Chronic Conditions and Co-morbidities  Goal: Patient's chronic conditions and co-morbidity symptoms are monitored and maintained or improved  Outcome: Progressing     Problem: Nutrition  Goal: Nutrient intake appropriate for maintaining nutritional needs  Outcome: Progressing     Problem: Fall/Injury  Goal: Not fall by end of shift  Outcome: Progressing  Goal: Be free from injury by end of the shift  Outcome: Progressing  Goal: Verbalize understanding of personal risk factors for fall in the hospital  Outcome: Progressing  Goal: Verbalize understanding of risk factor reduction measures to prevent injury from fall in the home  Outcome: Progressing  Goal: Use assistive devices by end of the shift  Outcome: Progressing  Goal: Pace activities to prevent fatigue by end of the shift  Outcome: Progressing     Problem: Deep Vein Thrombosis  Goal: I will remain free from complications of deep vein thrombosis and maintain current level of mobility  Outcome: Progressing     Problem: Skin  Goal: Decreased wound size/increased tissue granulation at next dressing change  Outcome: Progressing  Goal: Participates in plan/prevention/treatment measures  Outcome: Progressing  Goal: Prevent/manage excess moisture  Outcome: Progressing  Goal: Prevent/minimize sheer/friction injuries  Outcome: Progressing  Goal: Promote/optimize nutrition  Outcome: Progressing  Goal: Promote skin healing  Outcome: Progressing     Problem: Pain  Goal: Takes deep breaths with improved pain control throughout the shift  Outcome: Progressing  Goal: Turns in bed with improved pain control throughout the shift  Outcome: Progressing  Goal: Walks with improved pain control throughout the shift  Outcome: Progressing  Goal: Performs ADL's with improved  pain control throughout shift  Outcome: Progressing  Goal: Participates in PT with improved pain control throughout the shift  Outcome: Progressing  Goal: Free from opioid side effects throughout the shift  Outcome: Progressing  Goal: Free from acute confusion related to pain meds throughout the shift  Outcome: Progressing   The patient's goals for the shift include      The clinical goals for the shift include Safety    Over the shift, the patient did not make progress toward the following goals. Barriers to progression include confusion. Recommendations to address these barriers include fall risk interventions, maintain safety.

## 2025-05-18 NOTE — PROGRESS NOTES
Vancomycin Dosing by Pharmacy- FOLLOW UP    Jabari Dumas is a 84 y.o. year old male who Pharmacy has been consulted for vancomycin dosing for other (BSI). Based on the patient's indication and renal status this patient is being dosed based on a goal AUC of 400-600.     Renal function is currently stable.    Current vancomycin dose: 1000 mg given every 24 hours    Estimated vancomycin AUC on current dose: 560 mg/L.hr     Visit Vitals  BP (!) 91/48 (BP Location: Left arm, Patient Position: Lying) Comment: Low blood pressure, RN notified   Pulse 68   Temp 36.6 °C (97.9 °F) (Temporal)   Resp 17        Lab Results   Component Value Date    CREATININE 1.42 (H) 2025    CREATININE 1.38 (H) 2025    CREATININE 1.65 (H) 05/15/2025    CREATININE 1.26 2025        Patient weight is as follows:   Vitals:    05/15/25 1401   Weight: 69.2 kg (152 lb 8.9 oz)       Cultures:  Susceptibility data for the encounter in last 14 days.  Collected Specimen Info Organism   05/15/25 Blood culture from Peripheral Venipuncture Staphylococcus epidermidis   05/15/25 Blood culture from Peripheral Venipuncture Staphylococcus capitis     Enterococcus faecalis   05/15/25 Urine from Clean Catch/Voided Proteus mirabilis        I/O last 3 completed shifts:  In: 1390 (20.1 mL/kg) [P.O.:1040; IV Piggyback:350]  Out: 525 (7.6 mL/kg) [Urine:525 (0.2 mL/kg/hr)]  Weight: 69.2 kg   I/O during current shift:  No intake/output data recorded.    Temp (24hrs), Av.7 °C (98.1 °F), Min:36.6 °C (97.9 °F), Max:36.9 °C (98.4 °F)      Assessment/Plan    Within goal AUC range. Continue current vancomycin regimen.    This dosing regimen is predicted by [employer] to result in the following pharmacokinetic parameters:  Loading dose: N/A  Regimen: 1000 mg IV every 24 hours.  Start time: 13:35 on 2025  Exposure target: AUC24 (range)400-600 mg/L.hr   CSC28-40: 458 mg/L.hr  AUC24,ss: 560 mg/L.hr  Probability of AUC24 > 400: 97 %  Ctrough,ss: 17.7  mg/L  Probability of Ctrough,ss > 20: 31 %      The next level will be obtained on 5/21 at 0500. May be obtained sooner if clinically indicated.   Will continue to monitor renal function daily while on vancomycin and order serum creatinine at least every 48 hours if not already ordered.  Follow for continued vancomycin needs, clinical response, and signs/symptoms of toxicity.       CURT PAYNE

## 2025-05-18 NOTE — ASSESSMENT & PLAN NOTE
Hiatal hernia  Cyclic vomiting  Cirrhosis of the liver on CAT scan  Bilateral pneumonia  Possible aspiration pneumonia  Dementia  Chronic kidney disease stage IIIb  Constipation  UTI with Proteus Mirabella's.  Bacteremia with Staphylococcus epidermidis and Staphylococcus capitis and Enterococcus faecalis.  Plan: Continue current medication.  Supportive care.  Patient was started on broad-spectrum IV antibiotics.  For pneumonia or UTI.  Check urine culture, blood culture and sputum culture.  Give aerosol treatment.  Monitor pulse ox.  Local wound care for the left inguinal wound.  Consult wound care.  Consult ID and pulmonary.  Monitor renal function panel.  Monitor electrolytes and replete as needed.  Patient mild hypokalemia.  Patient had diarrhea.  Continue the present OmniHIB dose.  Check ammonia level.  Patient anxiety and agitation.  Patient does not aggressive towards the staff normal.  Start patient on Zyprexa as needed.  Medication reviewed and ordered.  We will take Dupee, fall, aspiration, decubitus, and DVT precautions    Date of service: 5/17/2025  Plan: Continue current medication.  Continue with IV antibiotics.  Supportive care.  ID is on consult.  Cardiology and ID input appreciated.  Good supportive care.  Monitor CBC and BMP.  Increase activity.  We will take DVT, fall, aspiration, decubitus, DVT precaution.  Check 2D echo.    Date of service: 5/18/2025    Plan: Continue current medication.  Supportive care.  Physical therapy.  Continue IV antibiotic.  Patient is slightly better.  ID input appreciated.  We will take DVT, fall, aspiration, decubitus, DVT precautions.

## 2025-05-18 NOTE — PROGRESS NOTES
Jabari Dumas is a 84 y.o. male on day 3 of admission presenting with Acute kidney injury.    Subjective   Patient was seen and examined.  Lying in the bed.  His confusion is slightly better.  Patient denied any headache, dizziness, nausea or vomiting.       Objective     Physical Exam  HEENT:  Head externally atraumatic,  extraocular movements intact, oral mucosa moist  Neck:  Supple, no JVP, no palpable adenopathy or thyromegaly.  No carotid bruit.  Chest:  Clear to auscultation and resonant.  Heart:  Regular rate and rhythm, murmur present but no gallop could be appreciated.  Abdomen:  Soft, nontender, bowel sounds present, normoactive, no palpable hepatosplenomegaly.  Extremities:  No edema, pulses present, no cyanosis or clubbing.  CNS:  Patient alert, oriented to time, place and person.    No new deficit.  Cranial nerves 2-12 grossly intact  Skin:  No active rash.  Musculoskeletal:  No  apparent joint swelling or erythema, range of movement normal.  Last Recorded Vitals  Heart Rate:  [63-78]   Temp:  [36.6 °C (97.9 °F)]   Resp:  [17-18]   BP: ()/()   SpO2:  [93 %-97 %]     Intake/Output last 3 Shifts:  I/O last 3 completed shifts:  In: 1140 (16.5 mL/kg) [P.O.:840; IV Piggyback:300]  Out: 225 (3.3 mL/kg) [Urine:225 (0.1 mL/kg/hr)]  Weight: 69.2 kg     Relevant Results  Susceptibility data from last 90 days.  Collected Specimen Info Organism Amikacin Amoxicillin/Clavulanate Ampicillin Ampicillin/Sulbactam Cefazolin Cefazolin (uncomplicated UTIs only) Ceftriaxone Cefuroxime (oral) Ciprofloxacin Clindamycin Erythromycin   05/15/25 Blood culture from Peripheral Venipuncture Staphylococcus epidermidis              05/15/25 Blood culture from Peripheral Venipuncture Enterococcus faecalis    S             Staphylococcus capitis              05/15/25 Urine from Clean Catch/Voided Proteus mirabilis  S  R  R  R  R  R  S  I  R     03/09/25 Fluid from SPUTUM Methicillin Susceptible Staphylococcus aureus  (MSSA)           S  S   03/08/25 Urine from Clean Catch/Voided Escherichia coli   S  R  I  I  S  S   S       Collected Specimen Info Organism Gentamicin Gentamicin Synergy Levofloxacin Nitrofurantoin Oxacillin Penicillin Piperacillin/Tazobactam Tetracycline Tobramycin Trimethoprim/Sulfamethoxazole Vancomycin   05/15/25 Blood culture from Peripheral Venipuncture Staphylococcus epidermidis              05/15/25 Blood culture from Peripheral Venipuncture Enterococcus faecalis   R     S     R  S     Staphylococcus capitis              05/15/25 Urine from Clean Catch/Voided Proteus mirabilis  S   R  R    S  R   R    03/09/25 Fluid from SPUTUM Methicillin Susceptible Staphylococcus aureus (MSSA)      S    S   S  S   03/08/25 Urine from Clean Catch/Voided Escherichia coli  R    S    S   I  S      Results for orders placed or performed during the hospital encounter of 05/15/25 (from the past 24 hours)   Vancomycin   Result Value Ref Range    Vancomycin 9.0 5.0 - 20.0 ug/mL   CBC and Auto Differential   Result Value Ref Range    WBC 5.8 4.4 - 11.3 x10*3/uL    nRBC 0.0 0.0 - 0.0 /100 WBCs    RBC 4.04 (L) 4.50 - 5.90 x10*6/uL    Hemoglobin 11.1 (L) 13.5 - 17.5 g/dL    Hematocrit 35.6 (L) 41.0 - 52.0 %    MCV 88 80 - 100 fL    MCH 27.5 26.0 - 34.0 pg    MCHC 31.2 (L) 32.0 - 36.0 g/dL    RDW 15.4 (H) 11.5 - 14.5 %    Platelets 236 150 - 450 x10*3/uL    Neutrophils % 66.9 40.0 - 80.0 %    Immature Granulocytes %, Automated 0.5 0.0 - 0.9 %    Lymphocytes % 18.2 13.0 - 44.0 %    Monocytes % 9.8 2.0 - 10.0 %    Eosinophils % 4.3 0.0 - 6.0 %    Basophils % 0.3 0.0 - 2.0 %    Neutrophils Absolute 3.91 1.60 - 5.50 x10*3/uL    Immature Granulocytes Absolute, Automated 0.03 0.00 - 0.50 x10*3/uL    Lymphocytes Absolute 1.06 0.80 - 3.00 x10*3/uL    Monocytes Absolute 0.57 0.05 - 0.80 x10*3/uL    Eosinophils Absolute 0.25 0.00 - 0.40 x10*3/uL    Basophils Absolute 0.02 0.00 - 0.10 x10*3/uL   Comprehensive Metabolic Panel   Result Value Ref  Range    Glucose 84 74 - 99 mg/dL    Sodium 141 136 - 145 mmol/L    Potassium 3.7 3.5 - 5.3 mmol/L    Chloride 109 (H) 98 - 107 mmol/L    Bicarbonate 26 21 - 32 mmol/L    Anion Gap 10 10 - 20 mmol/L    Urea Nitrogen 16 6 - 23 mg/dL    Creatinine 1.42 (H) 0.50 - 1.30 mg/dL    eGFR 48 (L) >60 mL/min/1.73m*2    Calcium 8.6 8.6 - 10.3 mg/dL    Albumin 2.8 (L) 3.4 - 5.0 g/dL    Alkaline Phosphatase 94 33 - 136 U/L    Total Protein 5.4 (L) 6.4 - 8.2 g/dL    AST 11 9 - 39 U/L    Bilirubin, Total 0.5 0.0 - 1.2 mg/dL    ALT 8 (L) 10 - 52 U/L      Current Medications[1]   Assessment/Plan   Assessment & Plan  Acute kidney injury    Altered mental status    Acute cystitis with hematuria    Open wound of left hip    Abnormal CT scan  Hiatal hernia  Cyclic vomiting  Cirrhosis of the liver on CAT scan  Bilateral pneumonia  Possible aspiration pneumonia  Dementia  Chronic kidney disease stage IIIb  Constipation  UTI with Proteus Mirabella's.  Bacteremia with Staphylococcus epidermidis and Staphylococcus capitis and Enterococcus faecalis.  Plan: Continue current medication.  Supportive care.  Patient was started on broad-spectrum IV antibiotics.  For pneumonia or UTI.  Check urine culture, blood culture and sputum culture.  Give aerosol treatment.  Monitor pulse ox.  Local wound care for the left inguinal wound.  Consult wound care.  Consult ID and pulmonary.  Monitor renal function panel.  Monitor electrolytes and replete as needed.  Patient mild hypokalemia.  Patient had diarrhea.  Continue the present OmniHIB dose.  Check ammonia level.  Patient anxiety and agitation.  Patient does not aggressive towards the staff normal.  Start patient on Zyprexa as needed.  Medication reviewed and ordered.  We will take Du[place], fall, aspiration, decubitus, and DVT precautions    Date of service: 5/17/2025  Plan: Continue current medication.  Continue with IV antibiotics.  Supportive care.  ID is on consult.  Cardiology and ID input appreciated.   Good supportive care.  Monitor CBC and BMP.  Increase activity.  We will take DVT, fall, aspiration, decubitus, DVT precaution.  Check 2D echo.    Date of service: 5/18/2025    Plan: Continue current medication.  Supportive care.  Physical therapy.  Continue IV antibiotic.  Patient is slightly better.  ID input appreciated.  We will take DVT, fall, aspiration, decubitus, DVT precautions.           Ramon Ramos MD          [1]   Current Facility-Administered Medications:     acetaminophen (Tylenol) tablet 650 mg, 650 mg, oral, q4h PRN, 650 mg at 05/17/25 1204 **OR** acetaminophen (Tylenol) oral liquid 650 mg, 650 mg, oral, q4h PRN, 650 mg at 05/17/25 0615 **OR** acetaminophen (Tylenol) suppository 650 mg, 650 mg, rectal, q4h PRN, Ramon Ramos MD    atorvastatin (Lipitor) tablet 40 mg, 40 mg, oral, Nightly, Ramon Ramos MD, 40 mg at 05/17/25 2030    benzocaine-menthol (Cepastat Sore Throat) lozenge 1 lozenge, 1 lozenge, Mouth/Throat, q2h PRN, Ramon Ramos MD    dextromethorphan-guaifenesin (Robitussin DM)  mg/5 mL oral liquid 5 mL, 5 mL, oral, q4h PRN, Ramon Ramos MD    finasteride (Proscar) tablet 5 mg, 5 mg, oral, Daily, Ramon Ramos MD, 5 mg at 05/18/25 1032    FLUoxetine (PROzac) capsule 10 mg, 10 mg, oral, Daily, Ramon Ramos MD, 10 mg at 05/18/25 1032    furosemide (Lasix) tablet 20 mg, 20 mg, oral, Daily, Ramon Ramos MD, 20 mg at 05/18/25 1032    guaiFENesin (Mucinex) 12 hr tablet 600 mg, 600 mg, oral, q12h PRN, Ramon Ramos MD    heparin (porcine) injection 5,000 Units, 5,000 Units, subcutaneous, q8h YARELIS, Ramon Ramos MD, 5,000 Units at 05/18/25 1431    ipratropium-albuteroL (Duo-Neb) 0.5-2.5 mg/3 mL nebulizer solution 3 mL, 3 mL, nebulization, q4h PRN, Ramon Ramos MD    melatonin tablet 6 mg, 6 mg, oral, Nightly PRN, Ramon Ramos MD, 6 mg at 05/17/25 2030    OLANZapine (ZyPREXA) injection 2.5 mg, 2.5 mg,  intramuscular, q4h PRN, Ramon Ramos MD, 2.5 mg at 05/16/25 1936    ondansetron ODT (Zofran-ODT) disintegrating tablet 4 mg, 4 mg, oral, q8h PRN **OR** ondansetron (Zofran) injection 4 mg, 4 mg, intravenous, q8h PRN, Ramon Ramos MD    oxyCODONE (Roxicodone) immediate release tablet 5 mg, 5 mg, oral, q6h PRN, Ramon Ramos MD    pantoprazole (ProtoNix) EC tablet 40 mg, 40 mg, oral, Daily before breakfast, Ramon Ramos MD, 40 mg at 05/18/25 0512    piperacillin-tazobactam (Zosyn) 2.25 g in dextrose (iso) IV 50 mL, 2.25 g, intravenous, q6h, Ramon Ramos MD, Last Rate: 0 mL/hr at 05/18/25 1313, 2.25 g at 05/18/25 1838    polyethylene glycol (Glycolax, Miralax) packet 17 g, 17 g, oral, Daily PRN, Ramon Ramos MD    potassium chloride CR (Klor-Con) ER tablet 10 mEq, 10 mEq, oral, Daily with breakfast, Ramon Ramos MD, 10 mEq at 05/18/25 1033    sennosides-docusate sodium (Sarah-Colace) 8.6-50 mg per tablet 2 tablet, 2 tablet, oral, Nightly, Ramon Ramos MD, 2 tablet at 05/17/25 2031    tamsulosin (Flomax) 24 hr capsule 0.4 mg, 0.4 mg, oral, Daily, Ramon Ramos MD, 0.4 mg at 05/18/25 1032    traZODone (Desyrel) tablet 50 mg, 50 mg, oral, Nightly, Ramon Ramos MD, 50 mg at 05/17/25 2031    vancomycin (Vancocin) pharmacy to dose - pharmacy monitoring, , miscellaneous, Daily PRN, Vj Montes De Oca MD    vancomycin (Xellia) 1,000 mg in diluent combination  mL, 1,000 mg, intravenous, q24h, Tacho Espinal, Stopped at 05/18/25 1626    zinc oxide 40 % ointment 1 Application, 1 Application, Topical, BID, Ramon Ramos MD, 1 Application at 05/18/25 103

## 2025-05-18 NOTE — PROGRESS NOTES
Jabari Dumas is a 84 y.o. male on day 3 of admission presenting with Acute kidney injury.    Subjective   Interval History:   Patient seen and examined  Afebrile  Nonverbal        Review of Systems   Unable to perform ROS: Patient nonverbal       Objective   Range of Vitals (last 24 hours)  Heart Rate:  [68-78]   Temp:  [36.6 °C (97.9 °F)-36.9 °C (98.4 °F)]   Resp:  [17-19]   BP: ()/(48-80)   SpO2:  [93 %-97 %]   Daily Weight  05/15/25 : 69.2 kg (152 lb 8.9 oz)    Body mass index is 21.28 kg/m².    Physical Exam  Constitutional:       General: He is awake.   HENT:      Head: Normocephalic and atraumatic.      Right Ear: External ear normal.      Left Ear: External ear normal.      Nose: Nose normal.   Eyes:      General: No scleral icterus.     Extraocular Movements: Extraocular movements intact.      Conjunctiva/sclera: Conjunctivae normal.   Cardiovascular:      Rate and Rhythm: Normal rate and regular rhythm.      Heart sounds: Normal heart sounds.   Pulmonary:      Effort: Pulmonary effort is normal.      Breath sounds: Normal breath sounds. No wheezing.   Abdominal:      General: Bowel sounds are normal.      Palpations: Abdomen is soft.      Tenderness: There is no abdominal tenderness.   Musculoskeletal:      Cervical back: Normal range of motion and neck supple.      Right lower leg: No edema.      Left lower leg: No edema.   Skin:     General: Skin is warm and dry.      Comments: Left anterior hip wound with moderate amount of granulation tissue   Neurological:      Mental Status: He is confused.   Psychiatric:         Behavior: Behavior is cooperative.     Antibiotics  piperacillin-tazobactam - 2.25 gram/50 mL  vancomycin - 1 gram/200 mL    Relevant Results  Labs  Results from last 72 hours   Lab Units 05/18/25  0410 05/16/25  0446 05/15/25  1441   WBC AUTO x10*3/uL 5.8 5.4 6.4   HEMOGLOBIN g/dL 11.1* 10.7* 12.4*   HEMATOCRIT % 35.6* 34.7* 39.2*   PLATELETS AUTO x10*3/uL 236 216 286   NEUTROS PCT  "AUTO % 66.9  --  72.0   LYMPHS PCT AUTO % 18.2  --  14.2   MONOS PCT AUTO % 9.8  --  12.3   EOS PCT AUTO % 4.3  --  0.9     Results from last 72 hours   Lab Units 05/18/25  0410 05/16/25  0446 05/15/25  1441   SODIUM mmol/L 141 143 142   POTASSIUM mmol/L 3.7 3.4* 3.8   CHLORIDE mmol/L 109* 111* 106   CO2 mmol/L 26 24 26   BUN mg/dL 16 16 19   CREATININE mg/dL 1.42* 1.38* 1.65*   GLUCOSE mg/dL 84 90 128*   CALCIUM mg/dL 8.6 8.6 9.5   ANION GAP mmol/L 10 11 14   EGFR mL/min/1.73m*2 48* 50* 40*     Results from last 72 hours   Lab Units 05/18/25 0410 05/16/25  0446 05/15/25  1441   ALK PHOS U/L 94 93 118   BILIRUBIN TOTAL mg/dL 0.5 0.6 0.6   PROTEIN TOTAL g/dL 5.4* 5.3* 6.8   ALT U/L 8* 9* 15   AST U/L 11 13 20   ALBUMIN g/dL 2.8* 2.8* 3.4     Estimated Creatinine Clearance: 37.2 mL/min (A) (by C-G formula based on SCr of 1.42 mg/dL (H)).  No results found for: \"CRP\"  Microbiology  Susceptibility data from last 14 days.  Collected Specimen Info Organism   05/15/25 Blood culture from Peripheral Venipuncture Staphylococcus epidermidis   05/15/25 Blood culture from Peripheral Venipuncture Staphylococcus capitis     Enterococcus faecalis   05/15/25 Urine from Clean Catch/Voided Proteus mirabilis     Imaging  ECG 12 lead  Result Date: 5/17/2025  sinus Tachycardia with frequent PACs, 1 st degree AV block. Right bundle branch block Possible Lateral infarct , age undetermined Abnormal ECG Confirmed by Jeremy Hill ([id number]) on 5/17/2025 9:05:23 AM    CT chest abdomen pelvis w IV contrast  Result Date: 5/15/2025  Interpreted By:  Jayant Quintero, STUDY: CT CHEST ABDOMEN PELVIS W IV CONTRAST;  5/15/2025 8:21 pm   INDICATION: Signs/Symptoms:fall.     COMPARISON: CT chest abdomen and pelvis 03/08/2025   ACCESSION NUMBER(S): [id number]   ORDERING CLINICIAN: CARLOS MYRICK   TECHNIQUE: Contiguous axial images of the chest, abdomen, and pelvis were obtained after the intravenous administration of 75 mL Omnipaque 350 contrast. Coronal " and sagittal reformatted images were reconstructed from the axial data.   FINDINGS: CT CHEST:   MEDIASTINUM AND LYMPH NODES: Enlarged, heterogeneous appearance of the left lobe of the thyroid measuring up to 4.7 cm. This extends into the substernal space. Partially visualized enlarged left cervical lymph node measuring up to 3 cm. No mediastinal or axillary lymphadenopathy identified. No pneumomediastinum. The esophagus appears within normal limits. There is a moderate-to-large hiatal hernia.   HEART: Normal size. Extensive triple-vessel coronary vascular calcifications. No significant pericardial effusion. Pacing leads extend into the right atrium and right ventricle.   VESSELS: Normal caliber aorta without dissection. Moderate calcifications of the aortic arch and descending thoracic aorta. The main pulmonary artery is normal in diameter.   LUNG, AIRWAYS, PLEURA: There is a small amount of debris within the mid to distal trachea. Assessment of the pulmonary parenchyma limited by respiratory motion. Dependent patchy and consolidative opacities in the bilateral lung bases. No pleural effusion or pneumothorax.   CHEST WALL SOFT TISSUES: No discernible abnormality. Left pectoral generator pack.   OSSEOUS STRUCTURES: No acute osseous abnormality. Median sternotomy changes.     CT ABDOMEN/PELVIS:   LIVER: Nodular surface morphology of the liver suggesting cirrhosis. 1.1 cm hypodensity in the lateral left hepatic lobe, probably a cyst.   BILE DUCTS: No significant intrahepatic or extrahepatic dilatation.   GALLBLADDER: Cholecystectomy.   PANCREAS: No significant abnormality.   SPLEEN: No significant abnormality.   ADRENALS: No significant abnormality.   KIDNEYS, URETERS, BLADDER: 3.5 cm right renal cyst. No hydronephrosis or hydroureter. No obstructing renal or ureteral calculus. Dependent density in the right aspect of the urinary bladder which may represent a mural calcification or intraluminal calculus.   REPRODUCTIVE  ORGANS: No significant abnormality.   GI: Moderate-to-large hiatal hernia. No bowel obstruction. No appreciable bowel inflammation within the limitations of the motion. Moderate-to-large amount of stool within the rectum. The appendix is not visualized.   VESSELS: No aortic aneurysm. Heavy atherosclerotic calcifications of the abdominal aorta and iliac vessels. The portal veins and IVC patent.   PERITONEUM/RETROPERITONEUM: No intraperitoneal free air or fluid.   LYMPH NODES: No enlarged lymph nodes.   ABDOMINAL WALL: Soft tissue defect/wound overlying the left inguinal region with adjacent subcutaneous air and fat stranding.   OSSEOUS STRUCTURES: Status post left total hip arthroplasty. Moderate-to-severe degenerative changes of the right hip.       1. No acute traumatic abnormality identified in the chest, abdomen, or pelvis. 2. Dependent patchy and consolidative opacities in the bilateral lung bases concerning for infectious or inflammatory process, including aspiration. There is also likely a component of volume loss. 3. Enlarged heterogeneous appearance of the left lobe of the thyroid in partially visualized enlarged left cervical lymph node. Follow-up ultrasound recommended for further assessment. 4. Nodular contour of the liver, suggestive of cirrhosis. Correlate with hepatic function tests. 5. Prominent soft tissue defect overlying the left inguinal region/upper thigh with surrounding fat stranding and adjacent air locules. Correlate for recent intervention or evidence of infection.   MACRO: None   Signed by: Jayant Quintero 5/15/2025 8:55 PM Dictation workstation:   [id number]    CT head wo IV contrast  Result Date: 5/15/2025  Interpreted By:  Jayant Quintero, STUDY: CT HEAD WO IV CONTRAST; CT CERVICAL SPINE WO IV CONTRAST;  5/15/2025 8:13 pm   INDICATION: Signs/Symptoms:fall; Signs/Symptoms:Fall altered mental status     COMPARISON: CT head and cervical spine 03/08/2025   ACCESSION NUMBER(S): [id number];  [id number]   ORDERING CLINICIAN: CARLOS MYRICK   TECHNIQUE: Axial noncontrast CT images of head with coronal and sagittal reconstructed images. Axial noncontrast CT images of the cervical spine with coronal and sagittal reconstructed images.   FINDINGS: CT HEAD:   Motion limits assessment.   BRAIN PARENCHYMA: Generalized cerebral volume loss. Gray-white differentiation is maintained. Patchy periventricular and subcortical white matter hypodensities, nonspecific but often seen in the setting of chronic microangiopathic change. No mass-effect, midline shift or effacement of cerebral sulci.   HEMORRHAGE: No acute intracranial hemorrhage.   VENTRICLES and EXTRA-AXIAL SPACES: The ventricles and sulci are within normal limits for brain volume. No abnormal extra-axial fluid collection.   ORBITS: The visualized orbits and globes are within normal limits.   EXTRACRANIAL SOFT TISSUES: Within normal limits.   PARANASAL SINUSES/MASTOIDS: Scattered opacification of the ethmoid air cells. The mastoid air cells are patent.   CALVARIUM: No definite calvarial fracture identified within the limitations of motion.     CT CERVICAL SPINE:   CRANIOCERVICAL JUNCTION: Intact.   ALIGNMENT: No traumatic malalignment or traumatic facet widening. Mild reversal of the cervical lordotic curvature centered at C3-C4. Degenerative grade 1 retrolisthesis of C5 on C6.   VERTEBRAE/DISC SPACES: No acute fracture. Vertebral body heights are maintained. Moderate-to-severe multilevel disc space height loss and associated degenerative endplate changes, greatest at C3-C4 and C5-C6. Moderate multilevel facet arthrosis bilaterally. No high grade spinal canal stenosis evident by CT.   SOFT TISSUES: Within normal limits.   OTHER: The visualized lung apices are clear. Enlarged heterogeneous appearance of the left lobe of the thyroid measuring up to 4.8 cm. There is an enlarged lymph node in the upper left jugular chain, inferior to the parotid gland, measuring  up to 3 cm (series 7, image 48).       CT HEAD: 1. Assessment limited by motion. 2. No definite acute intracranial abnormality within this limitation.     CT CERVICAL SPINE: 1. No acute fracture or traumatic malalignment of the cervical spine. 2. Moderate-to-severe degenerative changes of the cervical spine. 3. Enlarged heterogeneous appearance of the left lobe of the thyroid. Follow-up ultrasound should be considered for further assessment. 4. Enlarged lymph node in the upper left jugular chain measuring up to 3 cm. Appearance is concerning for malignant/metastatic process.   MACRO: None   Signed by: Jayant Quintero 5/15/2025 8:41 PM Dictation workstation:   [id number]    CT cervical spine wo IV contrast  Result Date: 5/15/2025  Interpreted By:  Jayant Quintero, STUDY: CT HEAD WO IV CONTRAST; CT CERVICAL SPINE WO IV CONTRAST;  5/15/2025 8:13 pm   INDICATION: Signs/Symptoms:fall; Signs/Symptoms:Fall altered mental status     COMPARISON: CT head and cervical spine 03/08/2025   ACCESSION NUMBER(S): [id number]; [id number]   ORDERING CLINICIAN: CARLOS MYRICK   TECHNIQUE: Axial noncontrast CT images of head with coronal and sagittal reconstructed images. Axial noncontrast CT images of the cervical spine with coronal and sagittal reconstructed images.   FINDINGS: CT HEAD:   Motion limits assessment.   BRAIN PARENCHYMA: Generalized cerebral volume loss. Gray-white differentiation is maintained. Patchy periventricular and subcortical white matter hypodensities, nonspecific but often seen in the setting of chronic microangiopathic change. No mass-effect, midline shift or effacement of cerebral sulci.   HEMORRHAGE: No acute intracranial hemorrhage.   VENTRICLES and EXTRA-AXIAL SPACES: The ventricles and sulci are within normal limits for brain volume. No abnormal extra-axial fluid collection.   ORBITS: The visualized orbits and globes are within normal limits.   EXTRACRANIAL SOFT TISSUES: Within normal limits.   PARANASAL  SINUSES/MASTOIDS: Scattered opacification of the ethmoid air cells. The mastoid air cells are patent.   CALVARIUM: No definite calvarial fracture identified within the limitations of motion.     CT CERVICAL SPINE:   CRANIOCERVICAL JUNCTION: Intact.   ALIGNMENT: No traumatic malalignment or traumatic facet widening. Mild reversal of the cervical lordotic curvature centered at C3-C4. Degenerative grade 1 retrolisthesis of C5 on C6.   VERTEBRAE/DISC SPACES: No acute fracture. Vertebral body heights are maintained. Moderate-to-severe multilevel disc space height loss and associated degenerative endplate changes, greatest at C3-C4 and C5-C6. Moderate multilevel facet arthrosis bilaterally. No high grade spinal canal stenosis evident by CT.   SOFT TISSUES: Within normal limits.   OTHER: The visualized lung apices are clear. Enlarged heterogeneous appearance of the left lobe of the thyroid measuring up to 4.8 cm. There is an enlarged lymph node in the upper left jugular chain, inferior to the parotid gland, measuring up to 3 cm (series 7, image 48).       CT HEAD: 1. Assessment limited by motion. 2. No definite acute intracranial abnormality within this limitation.     CT CERVICAL SPINE: 1. No acute fracture or traumatic malalignment of the cervical spine. 2. Moderate-to-severe degenerative changes of the cervical spine. 3. Enlarged heterogeneous appearance of the left lobe of the thyroid. Follow-up ultrasound should be considered for further assessment. 4. Enlarged lymph node in the upper left jugular chain measuring up to 3 cm. Appearance is concerning for malignant/metastatic process.   MACRO: None   Signed by: Jayant Quintero 5/15/2025 8:41 PM Dictation workstation:   [id number]         Assessment/Plan   Encephalopathy, toxic metabolic  Proteus urinary tract infection, susceptible to Zosyn, Rocephin  Postop left hip wound infection, recent culture grew Proteus  Chronic kidney disease stage III  Dementia  Positive blood  culture-Enterococcus, Staphylococcus epidermidis-Enterococcus susceptible to ampicillin     IV vancomycin  IV Zosyn  Follow-up wound culture  Repeat blood cultures-5/18/2025  Monitor renal function  Local care  Further recommendations based on culture results  Monitor temperature and WBC  Supportive care     This is a complex infectious disease issue and the following was performed today (for more details please see the above note): Management decisions reflecting the added complexity (e.g., changes in antimicrobial therapy, infection control strategies).        Vj Montes De Oca MD

## 2025-05-19 ENCOUNTER — APPOINTMENT (OUTPATIENT)
Dept: WOUND CARE | Facility: HOSPITAL | Age: 85
End: 2025-05-19
Payer: MEDICARE

## 2025-05-19 VITALS
SYSTOLIC BLOOD PRESSURE: 97 MMHG | DIASTOLIC BLOOD PRESSURE: 56 MMHG | HEIGHT: 71 IN | BODY MASS INDEX: 21.36 KG/M2 | RESPIRATION RATE: 19 BRPM | OXYGEN SATURATION: 97 % | HEART RATE: 65 BPM | WEIGHT: 152.56 LBS | TEMPERATURE: 97.7 F

## 2025-05-19 LAB
BACTERIA SPEC CULT: NORMAL
GRAM STN SPEC: NORMAL
GRAM STN SPEC: NORMAL

## 2025-05-19 PROCEDURE — 1100000001 HC PRIVATE ROOM DAILY

## 2025-05-19 PROCEDURE — 2500000004 HC RX 250 GENERAL PHARMACY W/ HCPCS (ALT 636 FOR OP/ED): Mod: JZ | Performed by: INTERNAL MEDICINE

## 2025-05-19 PROCEDURE — 2500000002 HC RX 250 W HCPCS SELF ADMINISTERED DRUGS (ALT 637 FOR MEDICARE OP, ALT 636 FOR OP/ED): Performed by: INTERNAL MEDICINE

## 2025-05-19 PROCEDURE — 97110 THERAPEUTIC EXERCISES: CPT | Mod: GO

## 2025-05-19 PROCEDURE — 2500000001 HC RX 250 WO HCPCS SELF ADMINISTERED DRUGS (ALT 637 FOR MEDICARE OP): Performed by: INTERNAL MEDICINE

## 2025-05-19 PROCEDURE — 2500000004 HC RX 250 GENERAL PHARMACY W/ HCPCS (ALT 636 FOR OP/ED)

## 2025-05-19 RX ADMIN — FINASTERIDE 5 MG: 5 TABLET, FILM COATED ORAL at 10:10

## 2025-05-19 RX ADMIN — HEPARIN SODIUM 5000 UNITS: 5000 INJECTION INTRAVENOUS; SUBCUTANEOUS at 21:35

## 2025-05-19 RX ADMIN — POTASSIUM CHLORIDE 10 MEQ: 750 TABLET, EXTENDED RELEASE ORAL at 10:10

## 2025-05-19 RX ADMIN — TAMSULOSIN HYDROCHLORIDE 0.4 MG: 0.4 CAPSULE ORAL at 10:10

## 2025-05-19 RX ADMIN — Medication 1 APPLICATION: at 21:37

## 2025-05-19 RX ADMIN — HEPARIN SODIUM 5000 UNITS: 5000 INJECTION INTRAVENOUS; SUBCUTANEOUS at 13:56

## 2025-05-19 RX ADMIN — FUROSEMIDE 20 MG: 20 TABLET ORAL at 10:10

## 2025-05-19 RX ADMIN — PIPERACILLIN SODIUM AND TAZOBACTAM SODIUM 2.25 G: 2; .25 INJECTION, SOLUTION INTRAVENOUS at 13:37

## 2025-05-19 RX ADMIN — HEPARIN SODIUM 5000 UNITS: 5000 INJECTION INTRAVENOUS; SUBCUTANEOUS at 05:18

## 2025-05-19 RX ADMIN — ATORVASTATIN CALCIUM 40 MG: 40 TABLET, FILM COATED ORAL at 21:36

## 2025-05-19 RX ADMIN — PIPERACILLIN SODIUM AND TAZOBACTAM SODIUM 2.25 G: 2; .25 INJECTION, SOLUTION INTRAVENOUS at 00:24

## 2025-05-19 RX ADMIN — PANTOPRAZOLE SODIUM 40 MG: 40 TABLET, DELAYED RELEASE ORAL at 10:10

## 2025-05-19 RX ADMIN — TRAZODONE HYDROCHLORIDE 50 MG: 50 TABLET ORAL at 21:40

## 2025-05-19 RX ADMIN — PIPERACILLIN SODIUM AND TAZOBACTAM SODIUM 2.25 G: 2; .25 INJECTION, SOLUTION INTRAVENOUS at 18:30

## 2025-05-19 RX ADMIN — FLUOXETINE HYDROCHLORIDE 10 MG: 10 CAPSULE ORAL at 10:10

## 2025-05-19 RX ADMIN — Medication 1 APPLICATION: at 10:15

## 2025-05-19 RX ADMIN — PIPERACILLIN SODIUM AND TAZOBACTAM SODIUM 2.25 G: 2; .25 INJECTION, SOLUTION INTRAVENOUS at 05:18

## 2025-05-19 RX ADMIN — VANCOMYCIN 1000 MG: 1 INJECTION, SOLUTION INTRAVENOUS at 13:56

## 2025-05-19 RX ADMIN — SENNOSIDES AND DOCUSATE SODIUM 2 TABLET: 50; 8.6 TABLET ORAL at 21:36

## 2025-05-19 ASSESSMENT — COGNITIVE AND FUNCTIONAL STATUS - GENERAL
STANDING UP FROM CHAIR USING ARMS: TOTAL
DAILY ACTIVITIY SCORE: 6
CLIMB 3 TO 5 STEPS WITH RAILING: TOTAL
PERSONAL GROOMING: TOTAL
EATING MEALS: TOTAL
DRESSING REGULAR LOWER BODY CLOTHING: TOTAL
MOVING FROM LYING ON BACK TO SITTING ON SIDE OF FLAT BED WITH BEDRAILS: A LITTLE
WALKING IN HOSPITAL ROOM: TOTAL
TURNING FROM BACK TO SIDE WHILE IN FLAT BAD: A LOT
MOVING TO AND FROM BED TO CHAIR: TOTAL
EATING MEALS: TOTAL
PERSONAL GROOMING: TOTAL
DAILY ACTIVITIY SCORE: 6
TOILETING: TOTAL
MOVING TO AND FROM BED TO CHAIR: TOTAL
TOILETING: TOTAL
MOBILITY SCORE: 9
HELP NEEDED FOR BATHING: TOTAL
PERSONAL GROOMING: TOTAL
HELP NEEDED FOR BATHING: TOTAL
DAILY ACTIVITIY SCORE: 6
DRESSING REGULAR LOWER BODY CLOTHING: TOTAL
TURNING FROM BACK TO SIDE WHILE IN FLAT BAD: A LOT
DRESSING REGULAR UPPER BODY CLOTHING: TOTAL
DRESSING REGULAR UPPER BODY CLOTHING: TOTAL
MOBILITY SCORE: 9
HELP NEEDED FOR BATHING: TOTAL
EATING MEALS: TOTAL
TOILETING: TOTAL
DRESSING REGULAR UPPER BODY CLOTHING: TOTAL
DRESSING REGULAR LOWER BODY CLOTHING: TOTAL
MOVING FROM LYING ON BACK TO SITTING ON SIDE OF FLAT BED WITH BEDRAILS: A LITTLE
STANDING UP FROM CHAIR USING ARMS: TOTAL
WALKING IN HOSPITAL ROOM: TOTAL
CLIMB 3 TO 5 STEPS WITH RAILING: TOTAL

## 2025-05-19 ASSESSMENT — PAIN SCALES - GENERAL
PAINLEVEL_OUTOF10: 0 - NO PAIN

## 2025-05-19 ASSESSMENT — PAIN - FUNCTIONAL ASSESSMENT: PAIN_FUNCTIONAL_ASSESSMENT: FLACC (FACE, LEGS, ACTIVITY, CRY, CONSOLABILITY)

## 2025-05-19 ASSESSMENT — ACTIVITIES OF DAILY LIVING (ADL): HOME_MANAGEMENT_TIME_ENTRY: 8

## 2025-05-19 NOTE — PROGRESS NOTES
Occupational Therapy    Occupational Therapy Treatment    Name: Jabari Dumas  MRN: [id number]  Department: [address]  Room: [facility]44-  Date: 05/19/25  Time Calculation  Start Time: 1510  Stop Time: 1527  Time Calculation (min): 17 min    Assessment:  OT Assessment: Patient is dependent for all ADLs/IADLs. He is unable to follow commands consistently this date. Will continue to trial skilled OT to maximize safety and independence. Changing recommendations from moderate intensity to low intensity with 24/7 available assist.  Prognosis: Fair  Barriers to Discharge Home: Cognition needs, Physical needs  Cognition Needs: Insight of patient limited regarding functional ability/needs  Physical Needs: 24hr mobility assistance needed, 24hr ADL assistance needed  Evaluation/Treatment Tolerance: Other (Comment) (limited secondary to cognitive status)  End of Session Communication: Bedside nurse, Care Coordinator  End of Session Patient Position: Bed, 3 rail up, Alarm on  Plan:  Treatment Interventions: ADL retraining, UE strengthening/ROM, Patient/family training  OT Frequency: 2 times per week  OT Discharge Recommendations: Low intensity level of continued care, 24 hr supervision due to cognition  Equipment Recommended upon Discharge: Other (comment) (TBD)  OT Recommended Transfer Status: Dependent  OT - OK to Discharge: Yes    Subjective     OT Visit Info:  OT Received On: 05/19/25  General:  General  Reason for Referral: Impaired ADLs, acute kidney injury  Referred By: Dr Ramos  Past Medical History Relevant to Rehab: SSS s/p pacer, CAD, CHF, CABG 2017, open wound Lt thigh, CKD 3, dementia w/ behavioral disturbances, COPD, anemia, GI bleed, anxiety, HTN, UTI, Lt hip fx s/p hemiarthroplasty via anterior approach  3/9/25  Family/Caregiver Present: No  Prior to Session Communication: Bedside nurse  Patient Position Received: Bed, 3 rail up, Alarm on  Preferred Learning Style: kinesthetic, verbal  General Comment: Cleared by  nursing for therapy  Precautions:  Medical Precautions: Fall precautions  Precautions Comment: recent left anterior hip hemiarthroplasty (3/9/25)    Pain Assessment:  Pain Assessment  Pain Assessment: FLACC (Face, Legs, Activity, Cry, Consolability)  0-10 (Numeric) Pain Score: 0 - No pain    Objective   Cognition:  Orientation Level: Disoriented to place, Disoriented to time, Disoriented to situation  Cognition Comments: inconsistently follows one step commands with increased cues and time. ~10% of the time. unable to hold a conversation  Insight: Severe  Activities of Daily Living: Grooming  Grooming Level of Assistance: Moderate assistance, Tactile cues, Maximum verbal cues  Grooming Where Assessed: Bed level  Grooming Comments: patient requires assistance to wash his face and use mouth swab for oral hygiene. Max A for oral hygiene tasks and Min A with increased time and cues to wash face. requires tactile cues to wash entire face    Therapy/Activity: Therapeutic Exercise  Therapeutic Exercise Performed: Yes  Therapeutic Exercise Activity 1: at bed level, patient completes 1x10 AAROM exercises.Exercises include bicep curls, chest press, shoulder flex/ext. cues and assistance required throughout for completion    Therapeutic Activity  Therapeutic Activity Performed: No  Therapeutic Activity 1: attempted to get patient to follow in functional activity, however, patient does not follow commands to participate    BASSAM BANEGAS :  (able to complete elbow/wrist/finger ROM) and JOSE GARCIA:  (able to complete elbow/wrist/finger ROM)    Outcome Measures:  [facility] Daily Activity  Putting on and taking off regular lower body clothing: Total  Bathing (including washing, rinsing, drying): Total  Putting on and taking off regular upper body clothing: Total  Toileting, which includes using toilet, bedpan or urinal: Total  Taking care of personal grooming such as brushing teeth: Total  Eating Meals: Total  Daily Activity - Total Score:  6    Education Documentation  Home Exercise Program, taught by Opal Brantley OT at 5/19/2025  4:13 PM.  Learner: Patient  Readiness: Acceptance  Method: Explanation, Demonstration  Response: No Evidence of Learning  Comment: Education on [facility] and completion throughout    ADL Training, taught by Opal Brantley OT at 5/19/2025  4:13 PM.  Learner: Patient  Readiness: Acceptance  Method: Explanation, Demonstration  Response: No Evidence of Learning  Comment: Education on [facility] and completion throughout    Education Comments  No comments found.      Goals:  Encounter Problems       Encounter Problems (Active)       OT Goals       ADLs (Progressing)       Start:  05/16/25    Expected End:  05/30/25       Patient will complete ADL tasks, with moderate assist using AE need in order to increase patient's safety and independence with self-care tasks.           Functional transfers (Not Progressing)       Start:  05/16/25    Expected End:  05/30/25       Patient will complete functional transfers with moderate assist using least restrictive device, in order to increase patient's safety and independence with daily tasks.           Activity tolerance (Not Progressing)       Start:  05/16/25    Expected End:  05/30/25       Patient will demonstrate the ability to participate in functional activity at least >/= 25 minutes in order to increase patient's safety and independence with daily tasks.

## 2025-05-19 NOTE — ASSESSMENT & PLAN NOTE
Hiatal hernia  Cyclic vomiting  Cirrhosis of the liver on CAT scan  Bilateral pneumonia  Possible aspiration pneumonia  Dementia  Chronic kidney disease stage IIIb  Constipation  UTI with Proteus Mirabella's.  Bacteremia with Staphylococcus epidermidis and Staphylococcus capitis and Enterococcus faecalis.  Plan: Continue current medication.  Supportive care.  Patient was started on broad-spectrum IV antibiotics.  For pneumonia or UTI.  Check urine culture, blood culture and sputum culture.  Give aerosol treatment.  Monitor pulse ox.  Local wound care for the left inguinal wound.  Consult wound care.  Consult ID and pulmonary.  Monitor renal function panel.  Monitor electrolytes and replete as needed.  Patient mild hypokalemia.  Patient had diarrhea.  Continue the present OmniHIB dose.  Check ammonia level.  Patient anxiety and agitation.  Patient does not aggressive towards the staff normal.  Start patient on Zyprexa as needed.  Medication reviewed and ordered.  We will take [place], fall, aspiration, decubitus, and DVT precautions    Date of service: 5/17/2025  Plan: Continue current medication.  Continue with IV antibiotics.  Supportive care.  ID is on consult.  Cardiology and ID input appreciated.  Good supportive care.  Monitor CBC and BMP.  Increase activity.  We will take DVT, fall, aspiration, decubitus, DVT precaution.  Check 2D echo.    Date of service: 5/18/2025    Plan: Continue current medication.  Supportive care.  Physical therapy.  Continue IV antibiotic.  Patient is slightly better.  ID input appreciated.  We will take DVT, fall, aspiration, decubitus, DVT precautions.    The date of service: 5/19/2025        Continue current medication.  Continue IV antibiotics.  Patient is more alert now.  Patient still confused.  No headache or dizziness.  No nausea vomiting.  Generalized weakness but no focal weakness numbness.  ID input appreciated.  Culture report is pending.  And change antibiotics according to  culture report and patient can be discharged once culture report is available and antibiotics can be determined.

## 2025-05-19 NOTE — CARE PLAN
The patient's goals for the shift include  safety and rest    The clinical goals for the shift include pain management, IV antibiotics, decrease agitation, safety, and promote rest      05/18/25 at 9:38 PM - Deborah Vaughan RN

## 2025-05-19 NOTE — PROGRESS NOTES
05/19/25 1251   Discharge Planning   Expected Discharge Disposition Inter   Does the patient need discharge transport arranged? Yes   RoundTrip coordination needed? Yes   Has discharge transport been arranged? No     MSW received messages from patient's brother requesting a change in facility. Patient currently from [facility]. However, PT is recommending moderate and brother agreeable to SNF. He states he wants [facility] and has already spoken to them.     Referral submitted to [facility] at this time. They will review and advise for SNF and are aware of plan for LTC as well.     1:10 PM  [facility] is reviewing referral now and will advise. Care Transitions will continue to follow.

## 2025-05-19 NOTE — PROGRESS NOTES
Jabari Dumas is a 84 y.o. male on day 4 of admission presenting with Acute kidney injury.    Subjective   Patient was seen and examined for lying in the bed.  Comfortable.  Did not look in acute distress.       Objective     Physical Exam  HEENT:  Head externally atraumatic,  extraocular movements intact, oral mucosa moist  Neck:  Supple, no JVP, no palpable adenopathy or thyromegaly.  No carotid bruit.  Chest:  Clear to auscultation and resonant.  Heart:  Regular rate and rhythm, no murmur or gallop could be appreciated.  Abdomen:  Soft, nontender, bowel sounds present, normoactive, no palpable hepatosplenomegaly.  Extremities:  No edema, pulses present, no cyanosis or clubbing.  CNS:  Patient alert, oriented to time, place and person.    No new deficit.  Cranial nerves 2-12 grossly intact  Skin:  No active rash.  Musculoskeletal:  No  apparent joint swelling or erythema, range of movement normal.  Last Recorded Vitals  Heart Rate:  [61-72]   Temp:  [36.3 °C (97.3 °F)-36.5 °C (97.7 °F)]   Resp:  [17-22]   BP: ()/(50-56)   SpO2:  [97 %-99 %]     Intake/Output last 3 Shifts:  I/O last 3 completed shifts:  In: 400 (5.8 mL/kg) [P.O.:250; IV Piggyback:150]  Out: - (0 mL/kg)   Weight: 69.2 kg     Relevant Results  Susceptibility data from last 90 days.  Collected Specimen Info Organism Amikacin Amoxicillin/Clavulanate Ampicillin Ampicillin/Sulbactam Cefazolin Cefazolin (uncomplicated UTIs only) Ceftriaxone Cefuroxime (oral) Ciprofloxacin Clindamycin Erythromycin   05/15/25 Blood culture from Peripheral Venipuncture Staphylococcus epidermidis                Staphylococcus capitis              05/15/25 Blood culture from Peripheral Venipuncture Enterococcus faecalis    S             Staphylococcus capitis              05/15/25 Urine from Clean Catch/Voided Proteus mirabilis  S  R  R  R  R  R  S  I  R     03/09/25 Fluid from SPUTUM Methicillin Susceptible Staphylococcus aureus (MSSA)           S  S   03/08/25 Urine  from Clean Catch/Voided Escherichia coli   S  R  I  I  S  S   S       Collected Specimen Info Organism Gentamicin Gentamicin Synergy Levofloxacin Nitrofurantoin Oxacillin Penicillin Piperacillin/Tazobactam Tetracycline Tobramycin Trimethoprim/Sulfamethoxazole Vancomycin   05/15/25 Blood culture from Peripheral Venipuncture Staphylococcus epidermidis                Staphylococcus capitis              05/15/25 Blood culture from Peripheral Venipuncture Enterococcus faecalis   R     S     R  S     Staphylococcus capitis              05/15/25 Urine from Clean Catch/Voided Proteus mirabilis  S   R  R    S  R   R    03/09/25 Fluid from SPUTUM Methicillin Susceptible Staphylococcus aureus (MSSA)      S    S   S  S   03/08/25 Urine from Clean Catch/Voided Escherichia coli  R    S    S   I  S      No results found for this or any previous visit (from the past 24 hours).   Current Medications[1]   Assessment/Plan   Assessment & Plan  Acute kidney injury    Altered mental status    Acute cystitis with hematuria    Open wound of left hip    Abnormal CT scan  Hiatal hernia  Cyclic vomiting  Cirrhosis of the liver on CAT scan  Bilateral pneumonia  Possible aspiration pneumonia  Dementia  Chronic kidney disease stage IIIb  Constipation  UTI with Proteus Mirabella's.  Bacteremia with Staphylococcus epidermidis and Staphylococcus capitis and Enterococcus faecalis.  Plan: Continue current medication.  Supportive care.  Patient was started on broad-spectrum IV antibiotics.  For pneumonia or UTI.  Check urine culture, blood culture and sputum culture.  Give aerosol treatment.  Monitor pulse ox.  Local wound care for the left inguinal wound.  Consult wound care.  Consult ID and pulmonary.  Monitor renal function panel.  Monitor electrolytes and replete as needed.  Patient mild hypokalemia.  Patient had diarrhea.  Continue the present OmniHIB dose.  Check ammonia level.  Patient anxiety and agitation.  Patient does not aggressive towards  the staff normal.  Start patient on Zyprexa as needed.  Medication reviewed and ordered.  We will take [place], fall, aspiration, decubitus, and DVT precautions    Date of service: 5/17/2025  Plan: Continue current medication.  Continue with IV antibiotics.  Supportive care.  ID is on consult.  Cardiology and ID input appreciated.  Good supportive care.  Monitor CBC and BMP.  Increase activity.  We will take DVT, fall, aspiration, decubitus, DVT precaution.  Check 2D echo.    Date of service: 5/18/2025    Plan: Continue current medication.  Supportive care.  Physical therapy.  Continue IV antibiotic.  Patient is slightly better.  ID input appreciated.  We will take DVT, fall, aspiration, decubitus, DVT precautions.    The date of service: 5/19/2025        Continue current medication.  Continue IV antibiotics.  Patient is more alert now.  Patient still confused.  No headache or dizziness.  No nausea vomiting.  Generalized weakness but no focal weakness numbness.  ID input appreciated.  Culture report is pending.  And change antibiotics according to culture report and patient can be discharged once culture report is available and antibiotics can be determined.           Ramon Ramos MD           [1]   Current Facility-Administered Medications:     acetaminophen (Tylenol) tablet 650 mg, 650 mg, oral, q4h PRN, 650 mg at 05/17/25 1204 **OR** acetaminophen (Tylenol) oral liquid 650 mg, 650 mg, oral, q4h PRN, 650 mg at 05/17/25 0615 **OR** acetaminophen (Tylenol) suppository 650 mg, 650 mg, rectal, q4h PRN, Ramon Ramos MD    atorvastatin (Lipitor) tablet 40 mg, 40 mg, oral, Nightly, Ramon Ramos MD, 40 mg at 05/18/25 2000    benzocaine-menthol (Cepastat Sore Throat) lozenge 1 lozenge, 1 lozenge, Mouth/Throat, q2h PRN, Ramon Ramos MD    dextromethorphan-guaifenesin (Robitussin DM)  mg/5 mL oral liquid 5 mL, 5 mL, oral, q4h PRN, Ramon Ramos MD    finasteride (Proscar) tablet 5 mg, 5  mg, oral, Daily, Ramon Ramos MD, 5 mg at 05/19/25 1010    FLUoxetine (PROzac) capsule 10 mg, 10 mg, oral, Daily, Ramon Ramos MD, 10 mg at 05/19/25 1010    furosemide (Lasix) tablet 20 mg, 20 mg, oral, Daily, Ramon Ramos MD, 20 mg at 05/19/25 1010    guaiFENesin (Mucinex) 12 hr tablet 600 mg, 600 mg, oral, q12h PRN, Ramon Ramos MD    heparin (porcine) injection 5,000 Units, 5,000 Units, subcutaneous, q8h YARELIS, Ramon Ramos MD, 5,000 Units at 05/19/25 1356    ipratropium-albuteroL (Duo-Neb) 0.5-2.5 mg/3 mL nebulizer solution 3 mL, 3 mL, nebulization, q4h PRN, Ramon Ramos MD    melatonin tablet 6 mg, 6 mg, oral, Nightly PRN, Ramon Ramos MD, 6 mg at 05/17/25 2030    OLANZapine (ZyPREXA) injection 2.5 mg, 2.5 mg, intramuscular, q4h PRN, Ramon Ramos MD, 2.5 mg at 05/18/25 1949    ondansetron ODT (Zofran-ODT) disintegrating tablet 4 mg, 4 mg, oral, q8h PRN **OR** ondansetron (Zofran) injection 4 mg, 4 mg, intravenous, q8h PRN, Ramon Ramos MD    oxyCODONE (Roxicodone) immediate release tablet 5 mg, 5 mg, oral, q6h PRN, Ramon Ramos MD    pantoprazole (ProtoNix) EC tablet 40 mg, 40 mg, oral, Daily before breakfast, Ramon Ramos MD, 40 mg at 05/19/25 1010    piperacillin-tazobactam (Zosyn) 2.25 g in dextrose (iso) IV 50 mL, 2.25 g, intravenous, q6h, Ramon Ramos MD, Stopped at 05/19/25 1346    polyethylene glycol (Glycolax, Miralax) packet 17 g, 17 g, oral, Daily PRN, Ramon Ramos MD    potassium chloride CR (Klor-Con) ER tablet 10 mEq, 10 mEq, oral, Daily with breakfast, Ramon Ramos MD, 10 mEq at 05/19/25 1010    sennosides-docusate sodium (Sarah-Colace) 8.6-50 mg per tablet 2 tablet, 2 tablet, oral, Nightly, Ramon Ramos MD, 2 tablet at 05/18/25 2000    tamsulosin (Flomax) 24 hr capsule 0.4 mg, 0.4 mg, oral, Daily, Ramon Ramos MD, 0.4 mg at 05/19/25 1010    traZODone (Desyrel) tablet 50 mg, 50 mg, oral,  Nightly, Ramon Ramos MD, 50 mg at 05/18/25 2000    vancomycin (Vancocin) pharmacy to dose - pharmacy monitoring, , miscellaneous, Daily PRN, Vj Montes De Oca MD    vancomycin (Xellia) 1,000 mg in diluent combination  mL, 1,000 mg, intravenous, q24h, Tacho Espinal, Stopped at 05/19/25 1411    zinc oxide 40 % ointment 1 Application, 1 Application, Topical, BID, Ramon Ramos MD, 1 Application at 05/19/25 1015

## 2025-05-19 NOTE — PROGRESS NOTES
Jabari Dumas is a 84 y.o. male on day 4 of admission presenting with Acute kidney injury.    Subjective   Interval History:   Patient seen and examined  Afebrile  Awake, confused, more vocal  Discussed with  nursing        Objective   Range of Vitals (last 24 hours)  Heart Rate:  [61-68]   Temp:  [36.5 °C (97.7 °F)-36.6 °C (97.9 °F)]   Resp:  [18-22]   BP: ()/()   SpO2:  [97 %]   Daily Weight  05/15/25 : 69.2 kg (152 lb 8.9 oz)    Body mass index is 21.28 kg/m².    Physical Exam  Constitutional:       General: He is awake.   HENT:      Head: Normocephalic and atraumatic.   Cardiovascular:      Rate and Rhythm: Normal rate and regular rhythm.      Heart sounds: Normal heart sounds.   Pulmonary:      Effort: Pulmonary effort is normal.      Breath sounds: Normal breath sounds. No wheezing.   Abdominal:      General: Bowel sounds are normal.      Palpations: Abdomen is soft.      Tenderness: There is no abdominal tenderness.   Musculoskeletal:      Cervical back: Normal range of motion and neck supple.      Right lower leg: No edema.      Left lower leg: No edema.   Skin:     General: Skin is warm and dry.      Comments: Left anterior hip dressing   Neurological:      Mental Status: He is confused.   Psychiatric:         Behavior: Behavior is cooperative.     Antibiotics  piperacillin-tazobactam - 2.25 gram/50 mL  vancomycin - 1 gram/200 mL    Relevant Results  Labs  Results from last 72 hours   Lab Units 05/18/25  0410   WBC AUTO x10*3/uL 5.8   HEMOGLOBIN g/dL 11.1*   HEMATOCRIT % 35.6*   PLATELETS AUTO x10*3/uL 236   NEUTROS PCT AUTO % 66.9   LYMPHS PCT AUTO % 18.2   MONOS PCT AUTO % 9.8   EOS PCT AUTO % 4.3     Results from last 72 hours   Lab Units 05/18/25  0410   SODIUM mmol/L 141   POTASSIUM mmol/L 3.7   CHLORIDE mmol/L 109*   CO2 mmol/L 26   BUN mg/dL 16   CREATININE mg/dL 1.42*   GLUCOSE mg/dL 84   CALCIUM mg/dL 8.6   ANION GAP mmol/L 10   EGFR mL/min/1.73m*2 48*     Results from last 72 hours  "  Lab Units 05/18/25  0410   ALK PHOS U/L 94   BILIRUBIN TOTAL mg/dL 0.5   PROTEIN TOTAL g/dL 5.4*   ALT U/L 8*   AST U/L 11   ALBUMIN g/dL 2.8*     Estimated Creatinine Clearance: 37.2 mL/min (A) (by C-G formula based on SCr of 1.42 mg/dL (H)).  No results found for: \"CRP\"  Microbiology  Susceptibility data from last 14 days.  Collected Specimen Info Organism Amikacin Amoxicillin/Clavulanate Ampicillin Ampicillin/Sulbactam Cefazolin Cefazolin (uncomplicated UTIs only) Ceftriaxone Cefuroxime (oral) Ciprofloxacin Gentamicin   05/15/25 Blood culture from Peripheral Venipuncture Staphylococcus epidermidis               Staphylococcus capitis             05/15/25 Blood culture from Peripheral Venipuncture Enterococcus faecalis    S            Staphylococcus capitis             05/15/25 Urine from Clean Catch/Voided Proteus mirabilis  S  R  R  R  R  R  S  I  R  S     Collected Specimen Info Organism Gentamicin Synergy Levofloxacin Nitrofurantoin Penicillin Piperacillin/Tazobactam Tetracycline Trimethoprim/Sulfamethoxazole Vancomycin   05/15/25 Blood culture from Peripheral Venipuncture Staphylococcus epidermidis             Staphylococcus capitis           05/15/25 Blood culture from Peripheral Venipuncture Enterococcus faecalis  R    S    R  S     Staphylococcus capitis           05/15/25 Urine from Clean Catch/Voided Proteus mirabilis   R  R   S  R  R      Imaging  ECG 12 lead  Result Date: 5/17/2025  sinus Tachycardia with frequent PACs, 1 st degree AV block. Right bundle branch block Possible Lateral infarct , age undetermined Abnormal ECG Confirmed by Jeremy Hill ([id number]) on 5/17/2025 9:05:23 AM    CT chest abdomen pelvis w IV contrast  Result Date: 5/15/2025  Interpreted By:  Jayant Quintero, STUDY: CT CHEST ABDOMEN PELVIS W IV CONTRAST;  5/15/2025 8:21 pm   INDICATION: Signs/Symptoms:fall.     COMPARISON: CT chest abdomen and pelvis 03/08/2025   ACCESSION NUMBER(S): [id number]   ORDERING CLINICIAN: CARLOS MYRICK   " TECHNIQUE: Contiguous axial images of the chest, abdomen, and pelvis were obtained after the intravenous administration of 75 mL Omnipaque 350 contrast. Coronal and sagittal reformatted images were reconstructed from the axial data.   FINDINGS: CT CHEST:   MEDIASTINUM AND LYMPH NODES: Enlarged, heterogeneous appearance of the left lobe of the thyroid measuring up to 4.7 cm. This extends into the substernal space. Partially visualized enlarged left cervical lymph node measuring up to 3 cm. No mediastinal or axillary lymphadenopathy identified. No pneumomediastinum. The esophagus appears within normal limits. There is a moderate-to-large hiatal hernia.   HEART: Normal size. Extensive triple-vessel coronary vascular calcifications. No significant pericardial effusion. Pacing leads extend into the right atrium and right ventricle.   VESSELS: Normal caliber aorta without dissection. Moderate calcifications of the aortic arch and descending thoracic aorta. The main pulmonary artery is normal in diameter.   LUNG, AIRWAYS, PLEURA: There is a small amount of debris within the mid to distal trachea. Assessment of the pulmonary parenchyma limited by respiratory motion. Dependent patchy and consolidative opacities in the bilateral lung bases. No pleural effusion or pneumothorax.   CHEST WALL SOFT TISSUES: No discernible abnormality. Left pectoral generator pack.   OSSEOUS STRUCTURES: No acute osseous abnormality. Median sternotomy changes.     CT ABDOMEN/PELVIS:   LIVER: Nodular surface morphology of the liver suggesting cirrhosis. 1.1 cm hypodensity in the lateral left hepatic lobe, probably a cyst.   BILE DUCTS: No significant intrahepatic or extrahepatic dilatation.   GALLBLADDER: Cholecystectomy.   PANCREAS: No significant abnormality.   SPLEEN: No significant abnormality.   ADRENALS: No significant abnormality.   KIDNEYS, URETERS, BLADDER: 3.5 cm right renal cyst. No hydronephrosis or hydroureter. No obstructing renal or  ureteral calculus. Dependent density in the right aspect of the urinary bladder which may represent a mural calcification or intraluminal calculus.   REPRODUCTIVE ORGANS: No significant abnormality.   GI: Moderate-to-large hiatal hernia. No bowel obstruction. No appreciable bowel inflammation within the limitations of the motion. Moderate-to-large amount of stool within the rectum. The appendix is not visualized.   VESSELS: No aortic aneurysm. Heavy atherosclerotic calcifications of the abdominal aorta and iliac vessels. The portal veins and IVC patent.   PERITONEUM/RETROPERITONEUM: No intraperitoneal free air or fluid.   LYMPH NODES: No enlarged lymph nodes.   ABDOMINAL WALL: Soft tissue defect/wound overlying the left inguinal region with adjacent subcutaneous air and fat stranding.   OSSEOUS STRUCTURES: Status post left total hip arthroplasty. Moderate-to-severe degenerative changes of the right hip.       1. No acute traumatic abnormality identified in the chest, abdomen, or pelvis. 2. Dependent patchy and consolidative opacities in the bilateral lung bases concerning for infectious or inflammatory process, including aspiration. There is also likely a component of volume loss. 3. Enlarged heterogeneous appearance of the left lobe of the thyroid in partially visualized enlarged left cervical lymph node. Follow-up ultrasound recommended for further assessment. 4. Nodular contour of the liver, suggestive of cirrhosis. Correlate with hepatic function tests. 5. Prominent soft tissue defect overlying the left inguinal region/upper thigh with surrounding fat stranding and adjacent air locules. Correlate for recent intervention or evidence of infection.   MACRO: None   Signed by: Jayant Quintero 5/15/2025 8:55 PM Dictation workstation:   [id number]    CT head wo IV contrast  Result Date: 5/15/2025  Interpreted By:  Jayant Quintero, STUDY: CT HEAD WO IV CONTRAST; CT CERVICAL SPINE WO IV CONTRAST;  5/15/2025 8:13 pm    INDICATION: Signs/Symptoms:fall; Signs/Symptoms:Fall altered mental status     COMPARISON: CT head and cervical spine 03/08/2025   ACCESSION NUMBER(S): [id number]; [id number]   ORDERING CLINICIAN: CARLOS MYRICK   TECHNIQUE: Axial noncontrast CT images of head with coronal and sagittal reconstructed images. Axial noncontrast CT images of the cervical spine with coronal and sagittal reconstructed images.   FINDINGS: CT HEAD:   Motion limits assessment.   BRAIN PARENCHYMA: Generalized cerebral volume loss. Gray-white differentiation is maintained. Patchy periventricular and subcortical white matter hypodensities, nonspecific but often seen in the setting of chronic microangiopathic change. No mass-effect, midline shift or effacement of cerebral sulci.   HEMORRHAGE: No acute intracranial hemorrhage.   VENTRICLES and EXTRA-AXIAL SPACES: The ventricles and sulci are within normal limits for brain volume. No abnormal extra-axial fluid collection.   ORBITS: The visualized orbits and globes are within normal limits.   EXTRACRANIAL SOFT TISSUES: Within normal limits.   PARANASAL SINUSES/MASTOIDS: Scattered opacification of the ethmoid air cells. The mastoid air cells are patent.   CALVARIUM: No definite calvarial fracture identified within the limitations of motion.     CT CERVICAL SPINE:   CRANIOCERVICAL JUNCTION: Intact.   ALIGNMENT: No traumatic malalignment or traumatic facet widening. Mild reversal of the cervical lordotic curvature centered at C3-C4. Degenerative grade 1 retrolisthesis of C5 on C6.   VERTEBRAE/DISC SPACES: No acute fracture. Vertebral body heights are maintained. Moderate-to-severe multilevel disc space height loss and associated degenerative endplate changes, greatest at C3-C4 and C5-C6. Moderate multilevel facet arthrosis bilaterally. No high grade spinal canal stenosis evident by CT.   SOFT TISSUES: Within normal limits.   OTHER: The visualized lung apices are clear. Enlarged heterogeneous  appearance of the left lobe of the thyroid measuring up to 4.8 cm. There is an enlarged lymph node in the upper left jugular chain, inferior to the parotid gland, measuring up to 3 cm (series 7, image 48).       CT HEAD: 1. Assessment limited by motion. 2. No definite acute intracranial abnormality within this limitation.     CT CERVICAL SPINE: 1. No acute fracture or traumatic malalignment of the cervical spine. 2. Moderate-to-severe degenerative changes of the cervical spine. 3. Enlarged heterogeneous appearance of the left lobe of the thyroid. Follow-up ultrasound should be considered for further assessment. 4. Enlarged lymph node in the upper left jugular chain measuring up to 3 cm. Appearance is concerning for malignant/metastatic process.   MACRO: None   Signed by: Jayant Quintero 5/15/2025 8:41 PM Dictation workstation:   [id number]    CT cervical spine wo IV contrast  Result Date: 5/15/2025  Interpreted By:  Jayant Quintero, STUDY: CT HEAD WO IV CONTRAST; CT CERVICAL SPINE WO IV CONTRAST;  5/15/2025 8:13 pm   INDICATION: Signs/Symptoms:fall; Signs/Symptoms:Fall altered mental status     COMPARISON: CT head and cervical spine 03/08/2025   ACCESSION NUMBER(S): [id number]; [id number]   ORDERING CLINICIAN: CARLOS MYRICK   TECHNIQUE: Axial noncontrast CT images of head with coronal and sagittal reconstructed images. Axial noncontrast CT images of the cervical spine with coronal and sagittal reconstructed images.   FINDINGS: CT HEAD:   Motion limits assessment.   BRAIN PARENCHYMA: Generalized cerebral volume loss. Gray-white differentiation is maintained. Patchy periventricular and subcortical white matter hypodensities, nonspecific but often seen in the setting of chronic microangiopathic change. No mass-effect, midline shift or effacement of cerebral sulci.   HEMORRHAGE: No acute intracranial hemorrhage.   VENTRICLES and EXTRA-AXIAL SPACES: The ventricles and sulci are within normal limits for brain volume. No  abnormal extra-axial fluid collection.   ORBITS: The visualized orbits and globes are within normal limits.   EXTRACRANIAL SOFT TISSUES: Within normal limits.   PARANASAL SINUSES/MASTOIDS: Scattered opacification of the ethmoid air cells. The mastoid air cells are patent.   CALVARIUM: No definite calvarial fracture identified within the limitations of motion.     CT CERVICAL SPINE:   CRANIOCERVICAL JUNCTION: Intact.   ALIGNMENT: No traumatic malalignment or traumatic facet widening. Mild reversal of the cervical lordotic curvature centered at C3-C4. Degenerative grade 1 retrolisthesis of C5 on C6.   VERTEBRAE/DISC SPACES: No acute fracture. Vertebral body heights are maintained. Moderate-to-severe multilevel disc space height loss and associated degenerative endplate changes, greatest at C3-C4 and C5-C6. Moderate multilevel facet arthrosis bilaterally. No high grade spinal canal stenosis evident by CT.   SOFT TISSUES: Within normal limits.   OTHER: The visualized lung apices are clear. Enlarged heterogeneous appearance of the left lobe of the thyroid measuring up to 4.8 cm. There is an enlarged lymph node in the upper left jugular chain, inferior to the parotid gland, measuring up to 3 cm (series 7, image 48).       CT HEAD: 1. Assessment limited by motion. 2. No definite acute intracranial abnormality within this limitation.     CT CERVICAL SPINE: 1. No acute fracture or traumatic malalignment of the cervical spine. 2. Moderate-to-severe degenerative changes of the cervical spine. 3. Enlarged heterogeneous appearance of the left lobe of the thyroid. Follow-up ultrasound should be considered for further assessment. 4. Enlarged lymph node in the upper left jugular chain measuring up to 3 cm. Appearance is concerning for malignant/metastatic process.   MACRO: None   Signed by: Jayant Quintero 5/15/2025 8:41 PM Dictation workstation:   [id number]         Assessment/Plan   Encephalopathy, toxic metabolic  Proteus  urinary tract infection, susceptible to Zosyn, Rocephin  Postop left hip wound infection, recent culture grew Proteus  Chronic kidney disease stage III  Dementia  Positive blood culture-Enterococcus, Staphylococcus epidermidis-Enterococcus susceptible to ampicillin     IV vancomycin  IV Zosyn  Follow-up wound culture  Follow up Repeat blood cultures-5/18/2025  Transthoracic echocardiogram   Monitor renal function  Local care  Further recommendations based on culture results  Monitor temperature and WBC  Supportive care          Virginia Fernandez, APRN-CNP

## 2025-05-19 NOTE — CARE PLAN
The patient's goals for the shift include      The clinical goals for the shift include IV antibioitcs, safety, and wound care      Problem: Safety - Adult  Goal: Free from fall injury  Outcome: Progressing     Problem: Discharge Planning  Goal: Discharge to home or other facility with appropriate resources  Outcome: Progressing     Problem: Chronic Conditions and Co-morbidities  Goal: Patient's chronic conditions and co-morbidity symptoms are monitored and maintained or improved  Outcome: Progressing     Problem: Nutrition  Goal: Nutrient intake appropriate for maintaining nutritional needs  Outcome: Progressing     Problem: Fall/Injury  Goal: Not fall by end of shift  Outcome: Progressing  Goal: Be free from injury by end of the shift  Outcome: Progressing  Goal: Verbalize understanding of personal risk factors for fall in the hospital  Outcome: Progressing  Goal: Verbalize understanding of risk factor reduction measures to prevent injury from fall in the home  Outcome: Progressing  Goal: Use assistive devices by end of the shift  Outcome: Progressing  Goal: Pace activities to prevent fatigue by end of the shift  Outcome: Progressing     Problem: Deep Vein Thrombosis  Goal: I will remain free from complications of deep vein thrombosis and maintain current level of mobility  Outcome: Progressing     Problem: Skin  Goal: Decreased wound size/increased tissue granulation at next dressing change  Outcome: Progressing  Goal: Participates in plan/prevention/treatment measures  Outcome: Progressing  Goal: Prevent/manage excess moisture  Outcome: Progressing  Goal: Prevent/minimize sheer/friction injuries  Outcome: Progressing  Goal: Promote/optimize nutrition  Outcome: Progressing  Goal: Promote skin healing  Outcome: Progressing     Problem: Pain  Goal: Takes deep breaths with improved pain control throughout the shift  Outcome: Progressing  Goal: Turns in bed with improved pain control throughout the shift  Outcome:  Progressing  Goal: Walks with improved pain control throughout the shift  Outcome: Progressing  Goal: Performs ADL's with improved pain control throughout shift  Outcome: Progressing  Goal: Participates in PT with improved pain control throughout the shift  Outcome: Progressing  Goal: Free from opioid side effects throughout the shift  Outcome: Progressing  Goal: Free from acute confusion related to pain meds throughout the shift  Outcome: Progressing

## 2025-05-20 ENCOUNTER — APPOINTMENT (OUTPATIENT)
Dept: CARDIOLOGY | Facility: HOSPITAL | Age: 85
End: 2025-05-20
Payer: MEDICARE

## 2025-05-20 DIAGNOSIS — N30.01 ACUTE CYSTITIS WITH HEMATURIA: ICD-10-CM

## 2025-05-20 DIAGNOSIS — R78.81 BACTEREMIA: ICD-10-CM

## 2025-05-20 LAB
BACTERIA BLD AEROBE CULT: ABNORMAL
BACTERIA BLD AEROBE CULT: ABNORMAL
BACTERIA BLD CULT: ABNORMAL
BACTERIA BLD CULT: ABNORMAL
GRAM STN SPEC: ABNORMAL
GRAM STN SPEC: ABNORMAL
Q ONSET: 206 MS
QRS COUNT: 15 BEATS
QRS DURATION: 128 MS
QT INTERVAL: 394 MS
QTC CALCULATION(BAZETT): 487 MS
QTC FREDERICIA: 454 MS
R AXIS: -63 DEGREES
T AXIS: 68 DEGREES
T OFFSET: 403 MS
VENTRICULAR RATE: 92 BPM

## 2025-05-20 PROCEDURE — 2500000004 HC RX 250 GENERAL PHARMACY W/ HCPCS (ALT 636 FOR OP/ED): Performed by: INTERNAL MEDICINE

## 2025-05-20 PROCEDURE — 1100000001 HC PRIVATE ROOM DAILY

## 2025-05-20 PROCEDURE — 97535 SELF CARE MNGMENT TRAINING: CPT | Mod: GO,CO

## 2025-05-20 PROCEDURE — 97530 THERAPEUTIC ACTIVITIES: CPT | Mod: GO,CO

## 2025-05-20 PROCEDURE — 97530 THERAPEUTIC ACTIVITIES: CPT | Mod: GP,CQ

## 2025-05-20 PROCEDURE — 2500000004 HC RX 250 GENERAL PHARMACY W/ HCPCS (ALT 636 FOR OP/ED): Mod: JZ | Performed by: NURSE PRACTITIONER

## 2025-05-20 PROCEDURE — 2500000005 HC RX 250 GENERAL PHARMACY W/O HCPCS: Performed by: INTERNAL MEDICINE

## 2025-05-20 PROCEDURE — 2500000002 HC RX 250 W HCPCS SELF ADMINISTERED DRUGS (ALT 637 FOR MEDICARE OP, ALT 636 FOR OP/ED): Performed by: INTERNAL MEDICINE

## 2025-05-20 PROCEDURE — 2500000001 HC RX 250 WO HCPCS SELF ADMINISTERED DRUGS (ALT 637 FOR MEDICARE OP): Performed by: INTERNAL MEDICINE

## 2025-05-20 RX ADMIN — HEPARIN SODIUM 5000 UNITS: 5000 INJECTION INTRAVENOUS; SUBCUTANEOUS at 21:13

## 2025-05-20 RX ADMIN — Medication 6 MG: at 21:22

## 2025-05-20 RX ADMIN — FLUOXETINE HYDROCHLORIDE 10 MG: 10 CAPSULE ORAL at 10:20

## 2025-05-20 RX ADMIN — PIPERACILLIN SODIUM AND TAZOBACTAM SODIUM 2.25 G: 2; .25 INJECTION, SOLUTION INTRAVENOUS at 00:14

## 2025-05-20 RX ADMIN — Medication 1 APPLICATION: at 10:26

## 2025-05-20 RX ADMIN — HEPARIN SODIUM 5000 UNITS: 5000 INJECTION INTRAVENOUS; SUBCUTANEOUS at 05:42

## 2025-05-20 RX ADMIN — TRAZODONE HYDROCHLORIDE 50 MG: 50 TABLET ORAL at 21:13

## 2025-05-20 RX ADMIN — PIPERACILLIN SODIUM AND TAZOBACTAM SODIUM 3.38 G: 3; .375 INJECTION, SOLUTION INTRAVENOUS at 14:49

## 2025-05-20 RX ADMIN — HEPARIN SODIUM 5000 UNITS: 5000 INJECTION INTRAVENOUS; SUBCUTANEOUS at 14:49

## 2025-05-20 RX ADMIN — TAMSULOSIN HYDROCHLORIDE 0.4 MG: 0.4 CAPSULE ORAL at 10:20

## 2025-05-20 RX ADMIN — FINASTERIDE 5 MG: 5 TABLET, FILM COATED ORAL at 10:20

## 2025-05-20 RX ADMIN — PIPERACILLIN SODIUM AND TAZOBACTAM SODIUM 2.25 G: 2; .25 INJECTION, SOLUTION INTRAVENOUS at 05:42

## 2025-05-20 RX ADMIN — FUROSEMIDE 20 MG: 20 TABLET ORAL at 10:20

## 2025-05-20 RX ADMIN — PIPERACILLIN SODIUM AND TAZOBACTAM SODIUM 3.38 G: 3; .375 INJECTION, SOLUTION INTRAVENOUS at 21:13

## 2025-05-20 RX ADMIN — POTASSIUM CHLORIDE 10 MEQ: 750 TABLET, EXTENDED RELEASE ORAL at 10:20

## 2025-05-20 RX ADMIN — Medication 1 APPLICATION: at 21:14

## 2025-05-20 RX ADMIN — ATORVASTATIN CALCIUM 40 MG: 40 TABLET, FILM COATED ORAL at 21:13

## 2025-05-20 RX ADMIN — PANTOPRAZOLE SODIUM 40 MG: 40 TABLET, DELAYED RELEASE ORAL at 10:28

## 2025-05-20 RX ADMIN — SENNOSIDES AND DOCUSATE SODIUM 2 TABLET: 50; 8.6 TABLET ORAL at 21:13

## 2025-05-20 ASSESSMENT — COGNITIVE AND FUNCTIONAL STATUS - GENERAL
MOVING TO AND FROM BED TO CHAIR: TOTAL
MOBILITY SCORE: 6
PERSONAL GROOMING: TOTAL
PERSONAL GROOMING: A LOT
CLIMB 3 TO 5 STEPS WITH RAILING: TOTAL
DRESSING REGULAR LOWER BODY CLOTHING: TOTAL
CLIMB 3 TO 5 STEPS WITH RAILING: TOTAL
CLIMB 3 TO 5 STEPS WITH RAILING: TOTAL
DAILY ACTIVITIY SCORE: 7
DRESSING REGULAR UPPER BODY CLOTHING: TOTAL
WALKING IN HOSPITAL ROOM: TOTAL
STANDING UP FROM CHAIR USING ARMS: TOTAL
WALKING IN HOSPITAL ROOM: TOTAL
TOILETING: TOTAL
DAILY ACTIVITIY SCORE: 6
TURNING FROM BACK TO SIDE WHILE IN FLAT BAD: A LOT
DAILY ACTIVITIY SCORE: 6
DRESSING REGULAR LOWER BODY CLOTHING: TOTAL
STANDING UP FROM CHAIR USING ARMS: TOTAL
DRESSING REGULAR LOWER BODY CLOTHING: TOTAL
DRESSING REGULAR UPPER BODY CLOTHING: TOTAL
TOILETING: TOTAL
PERSONAL GROOMING: TOTAL
WALKING IN HOSPITAL ROOM: TOTAL
TURNING FROM BACK TO SIDE WHILE IN FLAT BAD: A LOT
EATING MEALS: TOTAL
TOILETING: TOTAL
MOVING FROM LYING ON BACK TO SITTING ON SIDE OF FLAT BED WITH BEDRAILS: A LOT
MOVING TO AND FROM BED TO CHAIR: TOTAL
TURNING FROM BACK TO SIDE WHILE IN FLAT BAD: TOTAL
EATING MEALS: TOTAL
MOBILITY SCORE: 8
HELP NEEDED FOR BATHING: TOTAL
DRESSING REGULAR UPPER BODY CLOTHING: TOTAL
HELP NEEDED FOR BATHING: TOTAL
MOVING FROM LYING ON BACK TO SITTING ON SIDE OF FLAT BED WITH BEDRAILS: TOTAL
HELP NEEDED FOR BATHING: TOTAL
MOVING FROM LYING ON BACK TO SITTING ON SIDE OF FLAT BED WITH BEDRAILS: A LOT
MOBILITY SCORE: 8
EATING MEALS: TOTAL
MOVING TO AND FROM BED TO CHAIR: TOTAL
STANDING UP FROM CHAIR USING ARMS: TOTAL

## 2025-05-20 ASSESSMENT — PAIN SCALES - GENERAL
PAINLEVEL_OUTOF10: 0 - NO PAIN

## 2025-05-20 ASSESSMENT — PAIN SCALES - PAIN ASSESSMENT IN ADVANCED DEMENTIA (PAINAD)
FACIALEXPRESSION: SMILING OR INEXPRESSIVE
CONSOLABILITY: NO NEED TO CONSOLE
BREATHING: NORMAL
TOTALSCORE: 0
BODYLANGUAGE: RELAXED

## 2025-05-20 ASSESSMENT — PAIN - FUNCTIONAL ASSESSMENT
PAIN_FUNCTIONAL_ASSESSMENT: FLACC (FACE, LEGS, ACTIVITY, CRY, CONSOLABILITY)
PAIN_FUNCTIONAL_ASSESSMENT: 0-10

## 2025-05-20 ASSESSMENT — ACTIVITIES OF DAILY LIVING (ADL): HOME_MANAGEMENT_TIME_ENTRY: 27

## 2025-05-20 NOTE — PROGRESS NOTES
05/20/25 0904   Discharge Planning   Expected Discharge Disposition SNF   Does the patient need discharge transport arranged? Yes   RoundTrip coordination needed? Yes   Has discharge transport been arranged? No     [facility] [place] is reviewing the patient. They have concerns regarding IM zyprexa given on 5/18. Updates sent including updated MAR at this time. They will review and advise. Care Transitions will continue to follow.

## 2025-05-20 NOTE — NURSING NOTE
Assumed care of patient from previous shift RN. Pt resting in bed, denies any needs. Call bell within reach, bed alarm on.

## 2025-05-20 NOTE — CARE PLAN
The patient's goals for the shift include  safety, and rest    The clinical goals for the shift include IV antibiotics, safety, monitor agitation, cleanse incontinence, Q2 turn, and promote rest      05/19/25 at 10:29 PM - Debroah Vaughan RN

## 2025-05-20 NOTE — CARE PLAN
Problem: Safety - Adult  Goal: Free from fall injury  Outcome: Progressing     Problem: Discharge Planning  Goal: Discharge to home or other facility with appropriate resources  Outcome: Progressing     Problem: Chronic Conditions and Co-morbidities  Goal: Patient's chronic conditions and co-morbidity symptoms are monitored and maintained or improved  Outcome: Progressing     Problem: Nutrition  Goal: Nutrient intake appropriate for maintaining nutritional needs  Outcome: Progressing     Problem: Fall/Injury  Goal: Not fall by end of shift  Outcome: Progressing  Goal: Be free from injury by end of the shift  Outcome: Progressing  Goal: Verbalize understanding of personal risk factors for fall in the hospital  Outcome: Progressing  Goal: Verbalize understanding of risk factor reduction measures to prevent injury from fall in the home  Outcome: Progressing  Goal: Use assistive devices by end of the shift  Outcome: Progressing  Goal: Pace activities to prevent fatigue by end of the shift  Outcome: Progressing     Problem: Deep Vein Thrombosis  Goal: I will remain free from complications of deep vein thrombosis and maintain current level of mobility  Outcome: Progressing     Problem: Skin  Goal: Decreased wound size/increased tissue granulation at next dressing change  Outcome: Progressing  Goal: Participates in plan/prevention/treatment measures  Outcome: Progressing  Goal: Prevent/manage excess moisture  Outcome: Progressing  Goal: Prevent/minimize sheer/friction injuries  Outcome: Progressing  Goal: Promote/optimize nutrition  Outcome: Progressing  Goal: Promote skin healing  Outcome: Progressing     Problem: Pain  Goal: Takes deep breaths with improved pain control throughout the shift  Outcome: Progressing  Goal: Turns in bed with improved pain control throughout the shift  Outcome: Progressing  Goal: Walks with improved pain control throughout the shift  Outcome: Progressing  Goal: Performs ADL's with improved  pain control throughout shift  Outcome: Progressing  Goal: Participates in PT with improved pain control throughout the shift  Outcome: Progressing  Goal: Free from opioid side effects throughout the shift  Outcome: Progressing  Goal: Free from acute confusion related to pain meds throughout the shift  Outcome: Progressing   The patient's goals for the shift include      The clinical goals for the shift include Maintain safety, IV antibiotics, promote rest    Over the shift, the patient did not make progress toward the following goals. Barriers to progression include confusion. Recommendations to address these barriers include reorientation.

## 2025-05-20 NOTE — PROGRESS NOTES
Vancomycin Dosing by Pharmacy- Cessation of Therapy    Consult to pharmacy for vancomycin dosing has been discontinued by the prescriber, pharmacy will sign off at this time.    Please call pharmacy if there are further questions or re-enter a consult if vancomycin is resumed.     CURT PAYNE

## 2025-05-20 NOTE — PROGRESS NOTES
Physical Therapy    Physical Therapy Treatment    Patient Name: Jabari Dumas  MRN: [id number]  Department: [address]  Room: [address] [place] [address]  Today's Date: 5/20/2025  Time Calculation  Start Time: 1125  Stop Time: 1145  Time Calculation (min): 20 min         Assessment/Plan   PT Assessment  PT Assessment Results: Decreased strength, Decreased range of motion, Decreased endurance, Impaired balance, Decreased mobility, Decreased coordination, Decreased cognition, Impaired judgement, Decreased safety awareness, Impaired vision, Impaired tone, Decreased skin integrity, Pain  Rehab Prognosis: Poor  Barriers to Discharge Home: Caregiver assistance, Cognition needs, Physical needs  Caregiver Assistance: Caregiver assistance needed per identified barriers - however, level of patient's required assistance exceeds assistance available at home  Cognition Needs: 24hr supervision for safety awareness needed, Insight of patient limited regarding functional ability/needs, Cognition-related high falls risk  Physical Needs: 24hr mobility assistance needed, High falls risk due to function or environment  Evaluation/Treatment Tolerance: Other (Comment) (Limited by agitation)  Medical Staff Made Aware: Yes  Strengths: Support of Caregivers  Barriers to Participation: Comorbidities, Ability to acquire knowledge  End of Session Communication: Bedside nurse  Assessment Comment: Patient continues poor activity tolerance.Dependent x 2 for rolling today. Unable to participate with therapy today. Pending cogintion to continue therapy. Trial again tomorrow with OT  End of Session Patient Position: Bed, 3 rail up, Alarm on (Needs at reach)     PT Plan  Treatment/Interventions: Bed mobility, Positioning  PT Plan: Ongoing PT  PT Frequency: 3 times per week  PT Discharge Recommendations: Moderate intensity level of continued care  Equipment Recommended upon Discharge: Other (comment) (TBD)  PT Recommended Transfer Status: Assist x2  PT - OK to Discharge:  Yes    PT Visit Info:  PT Received On: 05/20/25     General Visit Information:   General  Reason for Referral: impaired mobility,acute kidney injury  Referred By: Dr Ramos  Past Medical History Relevant to Rehab: SSS s/p pacer, CAD, CHF, CABG 2017, open wound Lt thigh, CKD 3, dementia w/ behavioral disturbances, COPD, anemia, GI bleed, anxiety, HTN, UTI, Lt hip fx s/p hemiarthroplasty via anterior approach  3/9/25  Family/Caregiver Present: No  Prior to Session Communication: Bedside nurse  Patient Position Received: Bed, 3 rail up, Alarm on  Preferred Learning Style: kinesthetic, verbal  General Comment: Cleared by nurse.    Subjective   Precautions:  Precautions  Hearing/Visual Limitations: eyes close throughout most of session  LE Weight Bearing Status: Weight Bearing as Tolerated (L Le)  Medical Precautions: Fall precautions  Post-Surgical Precautions: Other (comment) (Recent L  anterior hip hemiarthroplasty 3/9/2025)  Precautions Comment: recent left anterior hip hemiarthroplasty (3/9/25)            Objective   Pain:  Pain Assessment  Pain Assessment: FLACC (Face, Legs, Activity, Cry, Consolability)  0-10 (Numeric) Pain Score: 0 - No pain  Cognition:  Cognition  Orientation Level: Disoriented to place, Disoriented to time, Disoriented to situation  Coordination:     Postural Control:     Extremity/Trunk Assessments:    Activity Tolerance:  Activity Tolerance  Endurance: Decreased tolerance for upright activites  Activity Tolerance Comments: poor  Treatments:  Therapeutic Activity  Therapeutic Activity Performed: Yes  Therapeutic Activity 1: In with wound care and nurse to address L incisional wound. Dependent x 2 to roll right/ left to check buttock area. Once rolled left noted incontinent of stool. Rolling right/left for bina hygeine care and bed linen change with [facility].    Bed Mobility  Bed Mobility: Yes  Bed Mobility 1  Bed Mobility 1: Rolling left, Rolling right  Level of Assistance 1: Dependent,  +2    Outcome Measures:  [facility] Basic Mobility  Turning from your back to your side while in a flat bed without using bedrails: Total  Moving from lying on your back to sitting on the side of a flat bed without using bedrails: Total  Moving to and from bed to chair (including a wheelchair): Total  Standing up from a chair using your arms (e.g. wheelchair or bedside chair): Total  To walk in hospital room: Total  Climbing 3-5 steps with railing: Total  Basic Mobility - Total Score: 6    Education Documentation  Mobility Training, taught by Mary Ann Blackwell PTA at 5/20/2025  2:13 PM.  Learner: Patient  Readiness: Acceptance  Method: Explanation  Response: Verbalizes Understanding, No Evidence of Learning  Comment: Unable to follow commands    Education Comments  No comments found.        OP EDUCATION:       Encounter Problems       Encounter Problems (Active)       Mobility       STG - Patient will ambulate 5' w/ mod assist of 2 and LRAD (Progressing)       Start:  05/16/25    Expected End:  05/30/25            Pt will consistently participate in LE exercises and/or therapeutic activities to promote functional strength, ROM, balance and safe mobility (Progressing)       Start:  05/16/25    Expected End:  05/30/25               PT Transfers       STG - Patient to transfer to and from sit to supine w/ mod assist or less (Progressing)       Start:  05/16/25    Expected End:  05/30/25            STG - Patient will transfer sit to and from stand w/ mod assist or < (Progressing)       Start:  05/16/25    Expected End:  05/30/25

## 2025-05-20 NOTE — PROGRESS NOTES
Jabari Dumas is a 84 y.o. male on day 5 of admission presenting with Acute kidney injury.    Subjective   Interval History:   Patient seen and examined  Afebrile, no chills reports  Nursing and wound care nurse at bedside  Left anterior hip wound examined  Transthoracic echocardiogram attempted-unable to follow commands       Objective   Range of Vitals (last 24 hours)  Heart Rate:  [72-82]   Temp:  [36.1 °C (97 °F)-36.6 °C (97.9 °F)]   Resp:  [17-21]   BP: ()/(50-71)   SpO2:  [92 %-99 %]   Daily Weight  05/15/25 : 69.2 kg (152 lb 8.9 oz)    Body mass index is 21.28 kg/m².    Physical Exam  Constitutional:       General: He is awake.   HENT:      Head: Normocephalic and atraumatic.   Cardiovascular:      Rate and Rhythm: Normal rate and regular rhythm.      Heart sounds: Normal heart sounds.   Pulmonary:      Effort: Pulmonary effort is normal.      Breath sounds: Normal breath sounds. No wheezing.   Abdominal:      General: Bowel sounds are normal.      Palpations: Abdomen is soft.      Tenderness: There is no abdominal tenderness.   Musculoskeletal:      Cervical back: Normal range of motion and neck supple.      Right lower leg: No edema.      Left lower leg: No edema.   Skin:     General: Skin is warm and dry.      Comments: Left anterior hip wound with moderate amount granulation tissue at base , no purulent drainage, no malodor   Neurological:      Mental Status: He is confused.   Psychiatric:         Behavior: Behavior is cooperative.     Antibiotics  piperacillin-tazobactam - 2.25 gram/50 mL  vancomycin - 1 gram/200 mL    Relevant Results  Labs  Results from last 72 hours   Lab Units 05/18/25  0410   WBC AUTO x10*3/uL 5.8   HEMOGLOBIN g/dL 11.1*   HEMATOCRIT % 35.6*   PLATELETS AUTO x10*3/uL 236   NEUTROS PCT AUTO % 66.9   LYMPHS PCT AUTO % 18.2   MONOS PCT AUTO % 9.8   EOS PCT AUTO % 4.3     Results from last 72 hours   Lab Units 05/18/25  0410   SODIUM mmol/L 141   POTASSIUM mmol/L 3.7   CHLORIDE  "mmol/L 109*   CO2 mmol/L 26   BUN mg/dL 16   CREATININE mg/dL 1.42*   GLUCOSE mg/dL 84   CALCIUM mg/dL 8.6   ANION GAP mmol/L 10   EGFR mL/min/1.73m*2 48*     Results from last 72 hours   Lab Units 05/18/25  0410   ALK PHOS U/L 94   BILIRUBIN TOTAL mg/dL 0.5   PROTEIN TOTAL g/dL 5.4*   ALT U/L 8*   AST U/L 11   ALBUMIN g/dL 2.8*     Estimated Creatinine Clearance: 37.2 mL/min (A) (by C-G formula based on SCr of 1.42 mg/dL (H)).  No results found for: \"CRP\"  Microbiology  Susceptibility data from last 14 days.  Collected Specimen Info Organism Amikacin Amoxicillin/Clavulanate Ampicillin Ampicillin/Sulbactam Cefazolin Cefazolin (uncomplicated UTIs only) Ceftriaxone Cefuroxime (oral) Ciprofloxacin Gentamicin   05/15/25 Blood culture from Peripheral Venipuncture Staphylococcus epidermidis               Staphylococcus capitis             05/15/25 Blood culture from Peripheral Venipuncture Enterococcus faecalis    S            Staphylococcus capitis             05/15/25 Urine from Clean Catch/Voided Proteus mirabilis  S  R  R  R  R  R  S  I  R  S     Collected Specimen Info Organism Gentamicin Synergy Levofloxacin Nitrofurantoin Penicillin Piperacillin/Tazobactam Tetracycline Trimethoprim/Sulfamethoxazole Vancomycin   05/15/25 Blood culture from Peripheral Venipuncture Staphylococcus epidermidis             Staphylococcus capitis           05/15/25 Blood culture from Peripheral Venipuncture Enterococcus faecalis  R    S    R  S     Staphylococcus capitis           05/15/25 Urine from Clean Catch/Voided Proteus mirabilis   R  R   S  R  R      Imaging  ECG 12 lead  Result Date: 5/17/2025  sinus Tachycardia with frequent PACs, 1 st degree AV block. Right bundle branch block Possible Lateral infarct , age undetermined Abnormal ECG Confirmed by Jeremy Hill ([id number]) on 5/17/2025 9:05:23 AM    CT chest abdomen pelvis w IV contrast  Result Date: 5/15/2025  Interpreted By:  Jayant Quintero, STUDY: CT CHEST ABDOMEN PELVIS W IV " CONTRAST;  5/15/2025 8:21 pm   INDICATION: Signs/Symptoms:fall.     COMPARISON: CT chest abdomen and pelvis 03/08/2025   ACCESSION NUMBER(S): [id number]   ORDERING CLINICIAN: CARLOS MYRICK   TECHNIQUE: Contiguous axial images of the chest, abdomen, and pelvis were obtained after the intravenous administration of 75 mL Omnipaque 350 contrast. Coronal and sagittal reformatted images were reconstructed from the axial data.   FINDINGS: CT CHEST:   MEDIASTINUM AND LYMPH NODES: Enlarged, heterogeneous appearance of the left lobe of the thyroid measuring up to 4.7 cm. This extends into the substernal space. Partially visualized enlarged left cervical lymph node measuring up to 3 cm. No mediastinal or axillary lymphadenopathy identified. No pneumomediastinum. The esophagus appears within normal limits. There is a moderate-to-large hiatal hernia.   HEART: Normal size. Extensive triple-vessel coronary vascular calcifications. No significant pericardial effusion. Pacing leads extend into the right atrium and right ventricle.   VESSELS: Normal caliber aorta without dissection. Moderate calcifications of the aortic arch and descending thoracic aorta. The main pulmonary artery is normal in diameter.   LUNG, AIRWAYS, PLEURA: There is a small amount of debris within the mid to distal trachea. Assessment of the pulmonary parenchyma limited by respiratory motion. Dependent patchy and consolidative opacities in the bilateral lung bases. No pleural effusion or pneumothorax.   CHEST WALL SOFT TISSUES: No discernible abnormality. Left pectoral generator pack.   OSSEOUS STRUCTURES: No acute osseous abnormality. Median sternotomy changes.     CT ABDOMEN/PELVIS:   LIVER: Nodular surface morphology of the liver suggesting cirrhosis. 1.1 cm hypodensity in the lateral left hepatic lobe, probably a cyst.   BILE DUCTS: No significant intrahepatic or extrahepatic dilatation.   GALLBLADDER: Cholecystectomy.   PANCREAS: No significant abnormality.    SPLEEN: No significant abnormality.   ADRENALS: No significant abnormality.   KIDNEYS, URETERS, BLADDER: 3.5 cm right renal cyst. No hydronephrosis or hydroureter. No obstructing renal or ureteral calculus. Dependent density in the right aspect of the urinary bladder which may represent a mural calcification or intraluminal calculus.   REPRODUCTIVE ORGANS: No significant abnormality.   GI: Moderate-to-large hiatal hernia. No bowel obstruction. No appreciable bowel inflammation within the limitations of the motion. Moderate-to-large amount of stool within the rectum. The appendix is not visualized.   VESSELS: No aortic aneurysm. Heavy atherosclerotic calcifications of the abdominal aorta and iliac vessels. The portal veins and IVC patent.   PERITONEUM/RETROPERITONEUM: No intraperitoneal free air or fluid.   LYMPH NODES: No enlarged lymph nodes.   ABDOMINAL WALL: Soft tissue defect/wound overlying the left inguinal region with adjacent subcutaneous air and fat stranding.   OSSEOUS STRUCTURES: Status post left total hip arthroplasty. Moderate-to-severe degenerative changes of the right hip.       1. No acute traumatic abnormality identified in the chest, abdomen, or pelvis. 2. Dependent patchy and consolidative opacities in the bilateral lung bases concerning for infectious or inflammatory process, including aspiration. There is also likely a component of volume loss. 3. Enlarged heterogeneous appearance of the left lobe of the thyroid in partially visualized enlarged left cervical lymph node. Follow-up ultrasound recommended for further assessment. 4. Nodular contour of the liver, suggestive of cirrhosis. Correlate with hepatic function tests. 5. Prominent soft tissue defect overlying the left inguinal region/upper thigh with surrounding fat stranding and adjacent air locules. Correlate for recent intervention or evidence of infection.   MACRO: None   Signed by: Jayant Quintero 5/15/2025 8:55 PM Dictation workstation:    [id number]    CT head wo IV contrast  Result Date: 5/15/2025  Interpreted By:  Jayant Quintero, STUDY: CT HEAD WO IV CONTRAST; CT CERVICAL SPINE WO IV CONTRAST;  5/15/2025 8:13 pm   INDICATION: Signs/Symptoms:fall; Signs/Symptoms:Fall altered mental status     COMPARISON: CT head and cervical spine 03/08/2025   ACCESSION NUMBER(S): [id number]; [id number]   ORDERING CLINICIAN: CARLOS MYRICK   TECHNIQUE: Axial noncontrast CT images of head with coronal and sagittal reconstructed images. Axial noncontrast CT images of the cervical spine with coronal and sagittal reconstructed images.   FINDINGS: CT HEAD:   Motion limits assessment.   BRAIN PARENCHYMA: Generalized cerebral volume loss. Gray-white differentiation is maintained. Patchy periventricular and subcortical white matter hypodensities, nonspecific but often seen in the setting of chronic microangiopathic change. No mass-effect, midline shift or effacement of cerebral sulci.   HEMORRHAGE: No acute intracranial hemorrhage.   VENTRICLES and EXTRA-AXIAL SPACES: The ventricles and sulci are within normal limits for brain volume. No abnormal extra-axial fluid collection.   ORBITS: The visualized orbits and globes are within normal limits.   EXTRACRANIAL SOFT TISSUES: Within normal limits.   PARANASAL SINUSES/MASTOIDS: Scattered opacification of the ethmoid air cells. The mastoid air cells are patent.   CALVARIUM: No definite calvarial fracture identified within the limitations of motion.     CT CERVICAL SPINE:   CRANIOCERVICAL JUNCTION: Intact.   ALIGNMENT: No traumatic malalignment or traumatic facet widening. Mild reversal of the cervical lordotic curvature centered at C3-C4. Degenerative grade 1 retrolisthesis of C5 on C6.   VERTEBRAE/DISC SPACES: No acute fracture. Vertebral body heights are maintained. Moderate-to-severe multilevel disc space height loss and associated degenerative endplate changes, greatest at C3-C4 and C5-C6. Moderate multilevel facet  arthrosis bilaterally. No high grade spinal canal stenosis evident by CT.   SOFT TISSUES: Within normal limits.   OTHER: The visualized lung apices are clear. Enlarged heterogeneous appearance of the left lobe of the thyroid measuring up to 4.8 cm. There is an enlarged lymph node in the upper left jugular chain, inferior to the parotid gland, measuring up to 3 cm (series 7, image 48).       CT HEAD: 1. Assessment limited by motion. 2. No definite acute intracranial abnormality within this limitation.     CT CERVICAL SPINE: 1. No acute fracture or traumatic malalignment of the cervical spine. 2. Moderate-to-severe degenerative changes of the cervical spine. 3. Enlarged heterogeneous appearance of the left lobe of the thyroid. Follow-up ultrasound should be considered for further assessment. 4. Enlarged lymph node in the upper left jugular chain measuring up to 3 cm. Appearance is concerning for malignant/metastatic process.   MACRO: None   Signed by: Jayant Quintero 5/15/2025 8:41 PM Dictation workstation:   [id number]    CT cervical spine wo IV contrast  Result Date: 5/15/2025  Interpreted By:  Jayant Quintero, STUDY: CT HEAD WO IV CONTRAST; CT CERVICAL SPINE WO IV CONTRAST;  5/15/2025 8:13 pm   INDICATION: Signs/Symptoms:fall; Signs/Symptoms:Fall altered mental status     COMPARISON: CT head and cervical spine 03/08/2025   ACCESSION NUMBER(S): [id number]; [id number]   ORDERING CLINICIAN: CARLOS MYRICK   TECHNIQUE: Axial noncontrast CT images of head with coronal and sagittal reconstructed images. Axial noncontrast CT images of the cervical spine with coronal and sagittal reconstructed images.   FINDINGS: CT HEAD:   Motion limits assessment.   BRAIN PARENCHYMA: Generalized cerebral volume loss. Gray-white differentiation is maintained. Patchy periventricular and subcortical white matter hypodensities, nonspecific but often seen in the setting of chronic microangiopathic change. No mass-effect, midline shift or  effacement of cerebral sulci.   HEMORRHAGE: No acute intracranial hemorrhage.   VENTRICLES and EXTRA-AXIAL SPACES: The ventricles and sulci are within normal limits for brain volume. No abnormal extra-axial fluid collection.   ORBITS: The visualized orbits and globes are within normal limits.   EXTRACRANIAL SOFT TISSUES: Within normal limits.   PARANASAL SINUSES/MASTOIDS: Scattered opacification of the ethmoid air cells. The mastoid air cells are patent.   CALVARIUM: No definite calvarial fracture identified within the limitations of motion.     CT CERVICAL SPINE:   CRANIOCERVICAL JUNCTION: Intact.   ALIGNMENT: No traumatic malalignment or traumatic facet widening. Mild reversal of the cervical lordotic curvature centered at C3-C4. Degenerative grade 1 retrolisthesis of C5 on C6.   VERTEBRAE/DISC SPACES: No acute fracture. Vertebral body heights are maintained. Moderate-to-severe multilevel disc space height loss and associated degenerative endplate changes, greatest at C3-C4 and C5-C6. Moderate multilevel facet arthrosis bilaterally. No high grade spinal canal stenosis evident by CT.   SOFT TISSUES: Within normal limits.   OTHER: The visualized lung apices are clear. Enlarged heterogeneous appearance of the left lobe of the thyroid measuring up to 4.8 cm. There is an enlarged lymph node in the upper left jugular chain, inferior to the parotid gland, measuring up to 3 cm (series 7, image 48).       CT HEAD: 1. Assessment limited by motion. 2. No definite acute intracranial abnormality within this limitation.     CT CERVICAL SPINE: 1. No acute fracture or traumatic malalignment of the cervical spine. 2. Moderate-to-severe degenerative changes of the cervical spine. 3. Enlarged heterogeneous appearance of the left lobe of the thyroid. Follow-up ultrasound should be considered for further assessment. 4. Enlarged lymph node in the upper left jugular chain measuring up to 3 cm. Appearance is concerning for  malignant/metastatic process.   MACRO: None   Signed by: Jayant Quintero 5/15/2025 8:41 PM Dictation workstation:   [id number]         Assessment/Plan   Encephalopathy, toxic metabolic  Proteus urinary tract infection, susceptible to Zosyn, Rocephin  Postop left hip wound infection, recent culture grew Proteus, repeat culture no growth   Chronic kidney disease stage III  Dementia  Enterococcus Bacteremia-susceptible to ampicillin, vancomycin    Positive blood culture- , Staphylococcus epidermidis -contaminant        Discontinue IV vancomycin  IV Zosyn-cover Enterococcus, proteus -optimize dosing   Follow up Repeat blood cultures-5/18/2025  Monitor renal function  Local care-orders placed   Monitor temperature and WBC  Supportive care  Discussed with Dr. Montes De Oca   Mid verses PICC line placement -when blood culture negative 48 hours     Long term plan IV zosyn till 5/30/2025  Weekly CBC with diff, CMP       Virginia Fernandez, APRN-CNP

## 2025-05-20 NOTE — PROGRESS NOTES
Occupational Therapy    OT Treatment    Patient Name: Jabari Dumas  MRN: [id number]  Department: [address]  Room: [address] [place] [address]  Today's Date: 5/20/2025  Time Calculation  Start Time: 1435  Stop Time: 1522  Time Calculation (min): 47 min        Assessment:  OT Assessment: Pt presenting with difficulty following directions and attending to task slow progress with POC session focused to bed mobility, UB dressing,grooming and feeding.  Will continue to address remaining deficits with skilledOT intervention.  Prognosis: Fair  Barriers to Discharge Home: Cognition needs, Physical needs  Cognition Needs: Insight of patient limited regarding functional ability/needs  Physical Needs: 24hr mobility assistance needed, 24hr ADL assistance needed  Evaluation/Treatment Tolerance: Other (Comment) (limited secondary to cognitive status)  End of Session Communication: Bedside nurse  End of Session Patient Position: Bed, 3 rail up, Alarm on (call light in reach all needs in met)  Prognosis: Fair  Evaluation/Treatment Tolerance: Other (Comment) (limited secondary to cognitive status)  Strengths: Support of Caregivers  Barriers to Participation: Comorbidities, Ability to acquire knowledge  Plan:  Treatment Interventions: ADL retraining, Functional transfer training, Endurance training, Patient/family training, Compensatory technique education  OT Frequency: 2 times per week  OT Discharge Recommendations: Low intensity level of continued care, 24 hr supervision due to cognition  Equipment Recommended upon Discharge: Other (comment) (TBD)  OT Recommended Transfer Status: Dependent  OT - OK to Discharge: Yes  Treatment Interventions: ADL retraining, Functional transfer training, Endurance training, Patient/family training, Compensatory technique education    Subjective   OT Visit Info:  OT Received On: 05/20/25  General Visit Info:  General  Reason for Referral: impaired mobility,acute kidney injury  Referred By: Dr Ramos  Past Medical  History Relevant to Rehab: SSS s/p pacer, CAD, CHF, CABG 2017, open wound Lt thigh, CKD 3, dementia w/ behavioral disturbances, COPD, anemia, GI bleed, anxiety, HTN, UTI, Lt hip fx s/p hemiarthroplasty via anterior approach  3/9/25  Prior to Session Communication: Bedside nurse  Patient Position Received: Bed, 4 rail up, Alarm on  Preferred Learning Style: kinesthetic, verbal  General Comment: Pt cleared by NSG and is agreeable to skilled OT intervention  Precautions:  LE Weight Bearing Status: Weight Bearing as Tolerated (LLE)  Medical Precautions: Fall precautions  Post-Surgical Precautions: Other (comment) (Recent L anterior hip hemiarthroplasty 3/9/2025)  Precautions Comment: Recent L anterior hip hemiarthroplasty 3/9/2025     Date/Time Vitals Session Patient Position Pulse Resp SpO2 BP MAP (mmHg)    05/20/25 1518 --  --  87  20  --  139/86  104                 Pain:  Pain Assessment  Pain Assessment: 0-10  0-10 (Numeric) Pain Score: 0 - No pain    Objective    Cognition:  Cognition  Overall Cognitive Status: Impaired at baseline  Orientation Level: Disoriented to place, Disoriented to time, Disoriented to situation  Cognition Comments: inconsistently follows one step commands with increased cues and time. ~10% of the time. unable to hold a conversation  Insight: Severe  Coordination:  Movements are Fluid and Coordinated: No  Coordination Comment: pt unable to coordinate extremity use for effective functional mobility. Did note pt attempting to bring hands to face.  Activities of Daily Living: Feeding  Feeding Level of Assistance: Maximum assistance  Feeding Where Assessed: Bed level  Feeding Comments: Pt hand over hand assist RUE feed pudding 4 trials however marked fatigue requiring assist remainder task    Grooming  Grooming Level of Assistance: Maximum assistance  Grooming Where Assessed: Bed level  Grooming Comments: hand over hand assist partial wash face RUE    UE Dressing  UE Dressing Level of  Assistance: Dependent  UE Dressing Where Assessed: Bed level  UE Dressing Comments: doff/don hospital gown    LE Dressing  LE Dressing: Yes  Sock Level of Assistance: Dependent  LE Dressing Where Assessed: Bed level  LE Dressing Comments: don B socks    Toileting  Toileting Level of Assistance: Dependent  Where Assessed: Bed level  Toileting Comments: incontinent bowel/bladder full assist rolling L<>R for hygiene Pt hand over hand to rail partial assist  Functional Standing Tolerance:     Bed Mobility/Transfers: Bed Mobility  Bed Mobility: Yes  Bed Mobility 1  Bed Mobility 1: Rolling right, Rolling left  Level of Assistance 1: Dependent  Bed Mobility Comments 1: full assist Pt partial hand over hand to rail guarded resistant to positional changes    Transfers  Transfer: No (Unsafe at this tiime)  Therapy/Activity:    Therapeutic Activity  Therapeutic Activity Performed: Yes  Therapeutic Activity 1: positioning RUE elevated d/t edema present    Outcome Measures:[facility] Daily Activity  Putting on and taking off regular lower body clothing: Total  Bathing (including washing, rinsing, drying): Total  Putting on and taking off regular upper body clothing: Total  Toileting, which includes using toilet, bedpan or urinal: Total  Taking care of personal grooming such as brushing teeth: A lot  Eating Meals: Total  Daily Activity - Total Score: 7    Education Documentation  ADL Training, taught by THEO Caro at 5/20/2025  3:47 PM.  Learner: Patient  Readiness: Acceptance  Method: Explanation, Demonstration, Teach-back  Response: No Evidence of Learning, Needs Reinforcement  Comment: Instructed Pt with body mechanics and adaptive ADL skills    Body Mechanics, taught by THEO Caro at 5/20/2025  3:47 PM.  Learner: Patient  Readiness: Acceptance  Method: Explanation, Demonstration, Teach-back  Response: No Evidence of Learning, Needs Reinforcement  Comment: Instructed Pt with body mechanics and adaptive ADL  skills    Education Comments  No comments found.        OP EDUCATION:       Goals:  Encounter Problems       Encounter Problems (Active)       OT Goals       ADLs (Progressing)       Start:  05/16/25    Expected End:  05/30/25       Patient will complete ADL tasks, with moderate assist using AE need in order to increase patient's safety and independence with self-care tasks.           Functional transfers (Not Progressing)       Start:  05/16/25    Expected End:  05/30/25       Patient will complete functional transfers with moderate assist using least restrictive device, in order to increase patient's safety and independence with daily tasks.           Activity tolerance (Not Progressing)       Start:  05/16/25    Expected End:  05/30/25       Patient will demonstrate the ability to participate in functional activity at least >/= 25 minutes in order to increase patient's safety and independence with daily tasks.

## 2025-05-20 NOTE — CONSULTS
Wound Care Consult     Visit Date: 5/20/2025      Patient Name: Jabari Dumas         MRN: [id number]           YOB: 1940    Consulted for wound care. A 84 y.o. male patient admitted for   Acute kidney injury [N17.9]  Acute cystitis with hematuria [N30.01]  Open wound of left hip, initial encounter [S71.002A]  Altered mental status, unspecified altered mental status type [R41.82]  Pneumonia of both lungs due to infectious organism, unspecified part of lung [J18.9]   Medical History[1]   Surgical History[2]   Scheduled medications  Scheduled Medications[3]  Continuous medications  Continuous Medications[4]  PRN medications  PRN Medications[5]     Allergies[6]     Susceptibility data from last 90 days.  Collected Specimen Info Organism Amikacin Amoxicillin/Clavulanate Ampicillin Ampicillin/Sulbactam Cefazolin Cefazolin (uncomplicated UTIs only) Ceftriaxone Cefuroxime (oral) Ciprofloxacin Clindamycin Erythromycin   05/15/25 Blood culture from Peripheral Venipuncture Staphylococcus epidermidis                Staphylococcus capitis              05/15/25 Blood culture from Peripheral Venipuncture Enterococcus faecalis    S             Staphylococcus capitis              05/15/25 Urine from Clean Catch/Voided Proteus mirabilis  S  R  R  R  R  R  S  I  R     03/09/25 Fluid from SPUTUM Methicillin Susceptible Staphylococcus aureus (MSSA)           S  S   03/08/25 Urine from Clean Catch/Voided Escherichia coli   S  R  I  I  S  S   S       Collected Specimen Info Organism Gentamicin Gentamicin Synergy Levofloxacin Nitrofurantoin Oxacillin Penicillin Piperacillin/Tazobactam Tetracycline Tobramycin Trimethoprim/Sulfamethoxazole Vancomycin   05/15/25 Blood culture from Peripheral Venipuncture Staphylococcus epidermidis                Staphylococcus capitis              05/15/25 Blood culture from Peripheral Venipuncture Enterococcus faecalis   R     S     R  S     Staphylococcus capitis              05/15/25 Urine  from Clean Catch/Voided Proteus mirabilis  S   R  R    S  R   R    03/09/25 Fluid from SPUTUM Methicillin Susceptible Staphylococcus aureus (MSSA)      S    S   S  S   03/08/25 Urine from Clean Catch/Voided Escherichia coli  R    S    S   I  S           Pertinent Labs:   Albumin   Date Value Ref Range Status   05/18/2025 2.8 (L) 3.4 - 5.0 g/dL Final       Wound Assessment:  Wound 03/08/25 Leg Dorsal;Proximal;Upper;Left;Anterior (Active)   Present on Admission to Healthcare Facility [facility] 05/16/25 0013   Wound Image   05/16/25 0013   Wound Length (cm) 2 cm 05/20/25 1139   Wound Width (cm) 1.5 cm 05/20/25 1139   Wound Surface Area (cm^2) 2.36 cm^2 05/20/25 1139   Wound Depth (cm) 2 cm 05/20/25 1139   Wound Volume (cm^3) 3.142 cm^3 05/20/25 1139   State of Healing Tunneling 05/20/25 1139   Tunneling 1.5 cm 05/20/25 1139   Tunneling Clock Position of Wound 12 05/20/25 1139   Margins Well-defined edges 05/20/25 1139   Drainage Description Serosanguineous 05/20/25 1139   Drainage Amount Small 05/20/25 1139   Dressing Foam 05/20/25 1139   Dressing Changed Changed 05/20/25 1141   Dressing Status Clean;Dry;Occlusive 05/20/25 1141       Wound 05/16/25 Toe D1, great Anterior;Right (Active)   Present on Admission to Healthcare Facility [facility] 05/16/25 0026   Wound Image   05/16/25 0026   Wound Length (cm) 1 cm 05/20/25 1139   Wound Width (cm) 0.8 cm 05/20/25 1139   Wound Surface Area (cm^2) 0.63 cm^2 05/20/25 1139   Wound Depth (cm) 0 cm 05/20/25 1139   Wound Volume (cm^3) 0 cm^3 05/20/25 1139   Drainage Description None 05/20/25 1139   Drainage Amount None 05/20/25 1139   Dressing Open to air 05/20/25 1141       Reason for Consult: Wound evaluation and recommondations        Wound History: Present on admission, photo by nursing    Wound Team Assessment: Patient contracted in fetal position, moaning, on left side, not responsive to verbal commands. Bedside nurse, JOHANNA Fernandez and  therapy present for exam.  Patient turned to his right  side, anterior surgical wound dehiscence noted mid incision line see assessment above, tunneling at 6 & 12 o'clock of 1.5 cm, wound was rinsed with saline, packed with packing tape and covered with foam dressing. Sacrum is red blancheable, intact, covered with sacral border. Right halluc tip has healing deep tissue injury, purple non blancheable, intact wound edges, recommend painting with betadine daily and leaving open to air. Bilateral heels and ankles are not red and skin is intact.      Recommendations: Recommend to flush wound with saline, pat dry, pack with packing tape ensure 6 & 12 o'clock tunnels are packed, cover with foam dressing daily. Shared with JOHANNA Fernandez CNP via [employer]     Patient is on a [facility] bed frame with [employer]ax Mattress, waffle mattress in place, Emmanuel score is 11. Elda Bolanos RN bedside nurse updated, continue pressure injury prevention interventions and nursing to continue to follow provider orders. Re consult wound RN PRN.    Ashley Silver BSN RN [facility]  5/20/2025  11:46 AM         [1]   Past Medical History:  Diagnosis Date    Gastrointestinal hemorrhage 03/08/2025   [2]   Past Surgical History:  Procedure Laterality Date    CT GUIDED IMAGING FOR NEEDLE PLACEMENT  12/7/2017    CT GUIDED IMAGING FOR NEEDLE PLACEMENT [facility]   [3] atorvastatin, 40 mg, oral, Nightly  finasteride, 5 mg, oral, Daily  FLUoxetine, 10 mg, oral, Daily  furosemide, 20 mg, oral, Daily  heparin (porcine), 5,000 Units, subcutaneous, q8h YARELIS  pantoprazole, 40 mg, oral, Daily before breakfast  piperacillin-tazobactam, 3.375 g, intravenous, q6h  potassium chloride CR, 10 mEq, oral, Daily with breakfast  sennosides-docusate sodium, 2 tablet, oral, Nightly  tamsulosin, 0.4 mg, oral, Daily  traZODone, 50 mg, oral, Nightly  zinc oxide, 1 Application, Topical, BID  [4]    [5] PRN medications: acetaminophen **OR** acetaminophen **OR** acetaminophen, benzocaine-menthol, dextromethorphan-guaifenesin,  guaiFENesin, ipratropium-albuteroL, melatonin, OLANZapine, ondansetron ODT **OR** ondansetron, oxyCODONE, polyethylene glycol  [6] No Known Allergies

## 2025-05-21 ENCOUNTER — APPOINTMENT (OUTPATIENT)
Dept: CARDIOLOGY | Facility: HOSPITAL | Age: 85
End: 2025-05-21
Payer: MEDICARE

## 2025-05-21 LAB
BACTERIA BLD AEROBE CULT: ABNORMAL
BACTERIA BLD AEROBE CULT: ABNORMAL
BACTERIA BLD CULT: ABNORMAL
BACTERIA BLD CULT: ABNORMAL
GRAM STN SPEC: ABNORMAL
GRAM STN SPEC: ABNORMAL

## 2025-05-21 PROCEDURE — 2500000002 HC RX 250 W HCPCS SELF ADMINISTERED DRUGS (ALT 637 FOR MEDICARE OP, ALT 636 FOR OP/ED): Performed by: INTERNAL MEDICINE

## 2025-05-21 PROCEDURE — C1751 CATH, INF, PER/CENT/MIDLINE: HCPCS

## 2025-05-21 PROCEDURE — 2500000001 HC RX 250 WO HCPCS SELF ADMINISTERED DRUGS (ALT 637 FOR MEDICARE OP): Performed by: INTERNAL MEDICINE

## 2025-05-21 PROCEDURE — 2500000004 HC RX 250 GENERAL PHARMACY W/ HCPCS (ALT 636 FOR OP/ED): Performed by: INTERNAL MEDICINE

## 2025-05-21 PROCEDURE — 1100000001 HC PRIVATE ROOM DAILY

## 2025-05-21 PROCEDURE — 36410 VNPNXR 3YR/> PHY/QHP DX/THER: CPT

## 2025-05-21 PROCEDURE — 2780000003 HC OR 278 NO HCPCS

## 2025-05-21 PROCEDURE — 2500000004 HC RX 250 GENERAL PHARMACY W/ HCPCS (ALT 636 FOR OP/ED): Mod: JZ | Performed by: NURSE PRACTITIONER

## 2025-05-21 RX ORDER — LIDOCAINE HYDROCHLORIDE 10 MG/ML
5 INJECTION, SOLUTION INFILTRATION; PERINEURAL ONCE
Status: DISCONTINUED | OUTPATIENT
Start: 2025-05-21 | End: 2025-05-22 | Stop reason: HOSPADM

## 2025-05-21 RX ADMIN — PIPERACILLIN SODIUM AND TAZOBACTAM SODIUM 3.38 G: 3; .375 INJECTION, SOLUTION INTRAVENOUS at 15:06

## 2025-05-21 RX ADMIN — FLUOXETINE HYDROCHLORIDE 10 MG: 10 CAPSULE ORAL at 09:36

## 2025-05-21 RX ADMIN — POTASSIUM CHLORIDE 10 MEQ: 750 TABLET, EXTENDED RELEASE ORAL at 09:36

## 2025-05-21 RX ADMIN — ACETAMINOPHEN 650 MG: 160 SOLUTION ORAL at 11:33

## 2025-05-21 RX ADMIN — TAMSULOSIN HYDROCHLORIDE 0.4 MG: 0.4 CAPSULE ORAL at 09:36

## 2025-05-21 RX ADMIN — PIPERACILLIN SODIUM AND TAZOBACTAM SODIUM 3.38 G: 3; .375 INJECTION, SOLUTION INTRAVENOUS at 20:31

## 2025-05-21 RX ADMIN — ATORVASTATIN CALCIUM 40 MG: 40 TABLET, FILM COATED ORAL at 20:31

## 2025-05-21 RX ADMIN — Medication 1 APPLICATION: at 20:37

## 2025-05-21 RX ADMIN — FUROSEMIDE 20 MG: 20 TABLET ORAL at 09:36

## 2025-05-21 RX ADMIN — PIPERACILLIN SODIUM AND TAZOBACTAM SODIUM 3.38 G: 3; .375 INJECTION, SOLUTION INTRAVENOUS at 09:36

## 2025-05-21 RX ADMIN — HEPARIN SODIUM 5000 UNITS: 5000 INJECTION INTRAVENOUS; SUBCUTANEOUS at 15:06

## 2025-05-21 RX ADMIN — FINASTERIDE 5 MG: 5 TABLET, FILM COATED ORAL at 09:36

## 2025-05-21 RX ADMIN — Medication 1 APPLICATION: at 09:46

## 2025-05-21 RX ADMIN — PANTOPRAZOLE SODIUM 40 MG: 40 TABLET, DELAYED RELEASE ORAL at 06:21

## 2025-05-21 RX ADMIN — HEPARIN SODIUM 5000 UNITS: 5000 INJECTION INTRAVENOUS; SUBCUTANEOUS at 06:21

## 2025-05-21 RX ADMIN — PIPERACILLIN SODIUM AND TAZOBACTAM SODIUM 3.38 G: 3; .375 INJECTION, SOLUTION INTRAVENOUS at 03:54

## 2025-05-21 RX ADMIN — TRAZODONE HYDROCHLORIDE 50 MG: 50 TABLET ORAL at 20:31

## 2025-05-21 ASSESSMENT — PAIN SCALES - PAIN ASSESSMENT IN ADVANCED DEMENTIA (PAINAD)
BODYLANGUAGE: RELAXED
FACIALEXPRESSION: SMILING OR INEXPRESSIVE
TOTALSCORE: 4
BREATHING: NORMAL
BODYLANGUAGE: TENSE, DISTRESSED PACING, FIDGETING
TOTALSCORE: 0
CONSOLABILITY: NO NEED TO CONSOLE
CONSOLABILITY: DISTRACTED OR REASSURED BY VOICE/TOUCH
BREATHING: NORMAL
FACIALEXPRESSION: SAD, FRIGHTENED, FROWN
TOTALSCORE: MEDICATION (SEE MAR);REPOSITIONED;REST
NEGVOCALIZATION: OCCASIONAL MOAN/GROAN, LOW SPEECH, NEGATIVE/DISAPPROVING QUALITY

## 2025-05-21 ASSESSMENT — COGNITIVE AND FUNCTIONAL STATUS - GENERAL
MOBILITY SCORE: 6
CLIMB 3 TO 5 STEPS WITH RAILING: TOTAL
STANDING UP FROM CHAIR USING ARMS: TOTAL
MOBILITY SCORE: 7
MOVING TO AND FROM BED TO CHAIR: TOTAL
MOVING FROM LYING ON BACK TO SITTING ON SIDE OF FLAT BED WITH BEDRAILS: TOTAL
DRESSING REGULAR UPPER BODY CLOTHING: TOTAL
CLIMB 3 TO 5 STEPS WITH RAILING: TOTAL
PERSONAL GROOMING: TOTAL
MOVING FROM LYING ON BACK TO SITTING ON SIDE OF FLAT BED WITH BEDRAILS: A LOT
EATING MEALS: TOTAL
MOVING TO AND FROM BED TO CHAIR: TOTAL
WALKING IN HOSPITAL ROOM: TOTAL
TURNING FROM BACK TO SIDE WHILE IN FLAT BAD: TOTAL
TURNING FROM BACK TO SIDE WHILE IN FLAT BAD: TOTAL
DAILY ACTIVITIY SCORE: 6
HELP NEEDED FOR BATHING: TOTAL
TOILETING: TOTAL
STANDING UP FROM CHAIR USING ARMS: TOTAL
WALKING IN HOSPITAL ROOM: TOTAL
DRESSING REGULAR LOWER BODY CLOTHING: TOTAL

## 2025-05-21 ASSESSMENT — PAIN SCALES - GENERAL
PAINLEVEL_OUTOF10: 0 - NO PAIN

## 2025-05-21 ASSESSMENT — PAIN - FUNCTIONAL ASSESSMENT
PAIN_FUNCTIONAL_ASSESSMENT: PAINAD (PAIN ASSESSMENT IN ADVANCED DEMENTIA SCALE)
PAIN_FUNCTIONAL_ASSESSMENT: PAINAD (PAIN ASSESSMENT IN ADVANCED DEMENTIA SCALE)
PAIN_FUNCTIONAL_ASSESSMENT: 0-10

## 2025-05-21 NOTE — NURSING NOTE
Wound care complete per order; patient tolerated well. Plan of care ongoing, no additional needs at this time.

## 2025-05-21 NOTE — PROGRESS NOTES
05/21/25 0847   Discharge Planning   Expected Discharge Disposition SNF   Does the patient need discharge transport arranged? Yes   RoundTrip coordination needed? Yes   Has discharge transport been arranged? No     Updates sent to [facility] at this time. Requested they confirm if able to accept the patient. Care Transitions will continue to follow.

## 2025-05-21 NOTE — PROGRESS NOTES
Jabari Dumas is a 84 y.o. male on day 5 of admission presenting with Acute kidney injury.    Subjective   Patient was seen and examined.  Lying in the bed comfortably denies any acute distress.  Patient denies any headache or dizziness.  No nausea vomiting.       Objective     Physical Exam  HEENT:  Head externally atraumatic,  extraocular movements intact, oral mucosa moist  Neck:  Supple, no JVP, no palpable adenopathy or thyromegaly.  No carotid bruit.  Chest:  Clear to auscultation and resonant.  Heart:  Regular rate and rhythm, no murmur or gallop could be appreciated.  Abdomen:  Soft, nontender, bowel sounds present, normoactive, no palpable hepatosplenomegaly.  Extremities:  No edema, pulses present, no cyanosis or clubbing.  CNS:  Patient alert, oriented to time, place and person.    No new deficit.  Cranial nerves 2-12 grossly intact  Skin:  No active rash.  Musculoskeletal:  No  apparent joint swelling or erythema, range of movement normal.  Last Recorded Vitals  Heart Rate:  [78-87]   Temp:  [36 °C (96.8 °F)-36.3 °C (97.3 °F)]   Resp:  [19-20]   BP: (107-139)/(64-86)   SpO2:  [93 %-97 %]     Intake/Output last 3 Shifts:  I/O last 3 completed shifts:  In: 1310 (18.9 mL/kg) [P.O.:660; IV Piggyback:650]  Out: - (0 mL/kg)   Weight: 69.2 kg     Relevant Results  Susceptibility data from last 90 days.  Collected Specimen Info Organism Amikacin Amoxicillin/Clavulanate Ampicillin Ampicillin/Sulbactam Cefazolin Cefazolin (uncomplicated UTIs only) Ceftriaxone Cefuroxime (oral) Ciprofloxacin Clindamycin Erythromycin   05/15/25 Blood culture from Peripheral Venipuncture Staphylococcus capitis           S  S     Staphylococcus epidermidis              05/15/25 Blood culture from Peripheral Venipuncture Enterococcus faecalis    S             Staphylococcus capitis              05/15/25 Urine from Clean Catch/Voided Proteus mirabilis  S  R  R  R  R  R  S  I  R     03/09/25 Fluid from SPUTUM Methicillin Susceptible  Staphylococcus aureus (MSSA)           S  S   03/08/25 Urine from Clean Catch/Voided Escherichia coli   S  R  I  I  S  S   S       Collected Specimen Info Organism Gentamicin Gentamicin Synergy Levofloxacin Nitrofurantoin Oxacillin Penicillin Piperacillin/Tazobactam Tetracycline Tobramycin Trimethoprim/Sulfamethoxazole Vancomycin   05/15/25 Blood culture from Peripheral Venipuncture Staphylococcus capitis      S    R   S  S     Staphylococcus epidermidis              05/15/25 Blood culture from Peripheral Venipuncture Enterococcus faecalis   R     S     R  S     Staphylococcus capitis              05/15/25 Urine from Clean Catch/Voided Proteus mirabilis  S   R  R    S  R   R    03/09/25 Fluid from SPUTUM Methicillin Susceptible Staphylococcus aureus (MSSA)      S    S   S  S   03/08/25 Urine from Clean Catch/Voided Escherichia coli  R    S    S   I  S      No results found for this or any previous visit (from the past 24 hours).   Current Medications[1]   Assessment/Plan   Assessment & Plan  Acute kidney injury    Altered mental status    Acute cystitis with hematuria    Open wound of left hip    Abnormal CT scan  Hiatal hernia  Cyclic vomiting  Cirrhosis of the liver on CAT scan  Bilateral pneumonia  Possible aspiration pneumonia  Dementia  Chronic kidney disease stage IIIb  Constipation  UTI with Proteus Mirabella's.  Bacteremia with Staphylococcus epidermidis and Staphylococcus capitis and Enterococcus faecalis.  Plan: Continue current medication.  Supportive care.  Patient was started on broad-spectrum IV antibiotics.  For pneumonia or UTI.  Check urine culture, blood culture and sputum culture.  Give aerosol treatment.  Monitor pulse ox.  Local wound care for the left inguinal wound.  Consult wound care.  Consult ID and pulmonary.  Monitor renal function panel.  Monitor electrolytes and replete as needed.  Patient mild hypokalemia.  Patient had diarrhea.  Continue the present OmniHIB dose.  Check ammonia level.   Patient anxiety and agitation.  Patient does not aggressive towards the staff normal.  Start patient on Zyprexa as needed.  Medication reviewed and ordered.  We will take [place], fall, aspiration, decubitus, and DVT precautions    Date of service: 5/17/2025  Plan: Continue current medication.  Continue with IV antibiotics.  Supportive care.  ID is on consult.  Cardiology and ID input appreciated.  Good supportive care.  Monitor CBC and BMP.  Increase activity.  We will take DVT, fall, aspiration, decubitus, DVT precaution.  Check 2D echo.    Date of service: 5/18/2025    Plan: Continue current medication.  Supportive care.  Physical therapy.  Continue IV antibiotic.  Patient is slightly better.  ID input appreciated.  We will take DVT, fall, aspiration, decubitus, DVT precautions.    The date of service: 5/19/2025        Continue current medication.  Continue IV antibiotics.  Patient is more alert now.  Patient still confused.  No headache or dizziness.  No nausea vomiting.  Generalized weakness but no focal weakness numbness.  ID input appreciated.  Culture report is pending.  And change antibiotics according to culture report and patient can be discharged once culture report is available and antibiotics can be determined.    Date of service: 5/20/2025    Plan: Continue current medication.  Continue current medication pending given antibiotics.  Patient has dementia.  Continue supportive care.  Patient is on IV vancomycin, Zosyn and Rocephin.  Patient will have a PICC line placed when blood cultures negative for 48 hours.  For that patient can have PICC line can be discharged back to extended-care facility.           Ramon Ramos MD           [1]   Current Facility-Administered Medications:     acetaminophen (Tylenol) tablet 650 mg, 650 mg, oral, q4h PRN, 650 mg at 05/17/25 1204 **OR** acetaminophen (Tylenol) oral liquid 650 mg, 650 mg, oral, q4h PRN, 650 mg at 05/17/25 0615 **OR** acetaminophen (Tylenol)  suppository 650 mg, 650 mg, rectal, q4h PRN, Ramon Ramos MD    atorvastatin (Lipitor) tablet 40 mg, 40 mg, oral, Nightly, Ramon Ramos MD, 40 mg at 05/20/25 2113    benzocaine-menthol (Cepastat Sore Throat) lozenge 1 lozenge, 1 lozenge, Mouth/Throat, q2h PRN, Ramon Ramos MD    dextromethorphan-guaifenesin (Robitussin DM)  mg/5 mL oral liquid 5 mL, 5 mL, oral, q4h PRN, Ramon Ramos MD    finasteride (Proscar) tablet 5 mg, 5 mg, oral, Daily, Ramon Ramos MD, 5 mg at 05/20/25 1020    FLUoxetine (PROzac) capsule 10 mg, 10 mg, oral, Daily, Ramon Ramos MD, 10 mg at 05/20/25 1020    furosemide (Lasix) tablet 20 mg, 20 mg, oral, Daily, Ramon Ramos MD, 20 mg at 05/20/25 1020    guaiFENesin (Mucinex) 12 hr tablet 600 mg, 600 mg, oral, q12h PRN, Ramon Ramos MD    heparin (porcine) injection 5,000 Units, 5,000 Units, subcutaneous, q8h YARELIS, Ramon Ramos MD, 5,000 Units at 05/20/25 2113    ipratropium-albuteroL (Duo-Neb) 0.5-2.5 mg/3 mL nebulizer solution 3 mL, 3 mL, nebulization, q4h PRN, Ramon Ramos MD    melatonin tablet 6 mg, 6 mg, oral, Nightly PRN, Ramon Ramos MD, 6 mg at 05/20/25 2122    OLANZapine (ZyPREXA) injection 2.5 mg, 2.5 mg, intramuscular, q4h PRN, Ramon Ramos MD, 2.5 mg at 05/18/25 1949    ondansetron ODT (Zofran-ODT) disintegrating tablet 4 mg, 4 mg, oral, q8h PRN **OR** ondansetron (Zofran) injection 4 mg, 4 mg, intravenous, q8h PRN, Ramon Ramos MD    oxyCODONE (Roxicodone) immediate release tablet 5 mg, 5 mg, oral, q6h PRN, Ramon Ramos MD    pantoprazole (ProtoNix) EC tablet 40 mg, 40 mg, oral, Daily before breakfast, Ramon Ramos MD, 40 mg at 05/20/25 1028    piperacillin-tazobactam (Zosyn) 3.375 g in dextrose (iso) IV 50 mL, 3.375 g, intravenous, q6h, Virginia Fernandez, APRN-CNP, Stopped at 05/20/25 2115    polyethylene glycol (Glycolax, Miralax) packet 17 g, 17 g, oral, Daily PRN,  Ramon Ramos MD    potassium chloride CR (Klor-Con) ER tablet 10 mEq, 10 mEq, oral, Daily with breakfast, Ramon Ramos MD, 10 mEq at 05/20/25 1020    sennosides-docusate sodium (Sarah-Colace) 8.6-50 mg per tablet 2 tablet, 2 tablet, oral, Nightly, Ramon Ramos MD, 2 tablet at 05/20/25 2113    tamsulosin (Flomax) 24 hr capsule 0.4 mg, 0.4 mg, oral, Daily, Ramon Ramos MD, 0.4 mg at 05/20/25 1020    traZODone (Desyrel) tablet 50 mg, 50 mg, oral, Nightly, Ramon Ramos MD, 50 mg at 05/20/25 2113    zinc oxide 40 % ointment 1 Application, 1 Application, Topical, BID, Ramon Ramos MD, 1 Application at 05/20/25 2114

## 2025-05-21 NOTE — PROCEDURES
Vascular Access Team Procedure Note     Visit Date: 5/21/2025      Patient Name: Jabari Dumas         MRN: [id number]             Procedure:Midline insertion  Right basilic 4F single lumen midline placed without difficulty. Catheter length 15cm with 0 exposed, arm circumference 32cm. Brisk blood return and flushes easily, clamped and Curos cap intact. Patient tolerated well. PIV x2 removed with tip intact, RN aware line good to use.                          Morena Zafar RN  5/21/2025  2:25 PM

## 2025-05-21 NOTE — CARE PLAN
The patient's goals for the shift include      The clinical goals for the shift include monitor labs and VS, re-orient as needed, encourage oral intake/feed, Q2 turns, IV antibx, promote safe environment, PT/OT, promote rest, discharge planning.      Problem: Safety - Adult  Goal: Free from fall injury  Outcome: Progressing     Problem: Pain - Adult  Goal: Verbalizes/displays adequate comfort level or baseline comfort level  Outcome: Progressing     Problem: Nutrition  Goal: Nutrient intake appropriate for maintaining nutritional needs  Outcome: Progressing     Problem: Fall/Injury  Goal: Not fall by end of shift  Outcome: Progressing  Goal: Be free from injury by end of the shift  Outcome: Progressing  Goal: Verbalize understanding of personal risk factors for fall in the hospital  Outcome: Progressing  Goal: Verbalize understanding of risk factor reduction measures to prevent injury from fall in the home  Outcome: Progressing  Goal: Use assistive devices by end of the shift  Outcome: Progressing  Goal: Pace activities to prevent fatigue by end of the shift  Outcome: Progressing

## 2025-05-21 NOTE — PROGRESS NOTES
"Nutrition Follow up Note    Nutrition Assessment      Plan for discharge to SNF when arrangements made. Pt cotinines to be confused. Recently admitted to [facility] from 3/8-3/12 with L femur fracture requiring surgery on 3/9.     Nutrition History:  Energy Intake: Poor < 50 %  Food and Nutrient History: family at bedside assisting pt with meals. pt is drinking ensure as ordered. family reports pt's po intake and functional status has declined since admission to [facility].    Anthropometrics:  Ht: 180.3 cm (5' 11\"), Wt: 69.2 kg (152 lb 8.9 oz), BMI: 21.29    Weight Change:  Daily Weight  05/15/25 : 69.2 kg (152 lb 8.9 oz)  03/12/25 : 87.4 kg (192 lb 10.9 oz)  03/08/25 : 175#  06/23/21 : 75.9 kg (167 lb 6.4 oz)  05/18/21 : 79.6 kg (175 lb 6.4 oz)  05/13/21 : 79.4 kg (175 lb)  04/26/21 : 78.7 kg (173 lb 9.6 oz)  03/26/21 : 79.1 kg (174 lb 4.8 oz)  03/08/21 : 78.2 kg (172 lb 8 oz)  01/25/21 : 73.5 kg (162 lb)  01/21/21 : 74.8 kg (165 lb)     Weight History / % Weight Change: 23# (13.1%) wt loss over ~2 months (3/8-5/15)  Significant Weight Loss: Yes    Nutrition Focused Physical Exam Findings:   Subcutaneous Fat Loss  Orbital Fat Pads: Mild-Moderate (slight dark circles and slight hollowing)  Buccal Fat Pads: Mild-Moderate (flat cheeks, minimal bounce)    Muscle Wasting  Temporalis: Mild-Moderate (slight depression)  Pectoralis (Clavicular Region): Mild-Moderate (some protrusion of clavicle)  Deltoid/Trapezius: Mild-Moderate (slight protrusion of acromion process)  Gastrocnemius: Mild-Moderate (not well developed muscle)    Edema  Edema:  (nonpitting)  Edema Location: BLE, RUE    Physical Findings  Skin: Positive  Positive Skin Findings: Impaired wound healing (R great toe and LLE wound)  Teeth Findings: Edentulous    Nutrition Significant Labs:  Lab Results   Component Value Date    WBC 5.8 05/18/2025    HGB 11.1 (L) 05/18/2025    HCT 35.6 (L) 05/18/2025     05/18/2025    CHOL 61 (L) 08/05/2021    TRIG 50 " 08/05/2021    HDL 34 (L) 08/05/2021    ALT 8 (L) 05/18/2025    AST 11 05/18/2025     05/18/2025    K 3.7 05/18/2025     (H) 05/18/2025    CREATININE 1.42 (H) 05/18/2025    BUN 16 05/18/2025    CO2 26 05/18/2025    TSH 0.53 08/02/2024    INR 1.2 (H) 03/09/2025    HGBA1C 5.2 08/02/2024     Nutrition Specific Medications:  Scheduled Medications[1]  Continuous Medications[2]    Dietary Orders (From admission, onward)       Start     Ordered    05/21/25 1304  Adult diet Regular; Soft and bite sized 6  Diet effective now        Question Answer Comment   Diet type Regular    Texture Soft and bite sized 6        05/21/25 1303    05/21/25 1202  Oral nutritional supplements  Until discontinued        Question Answer Comment   Deliver with All meals    Select supplement: Ensure Plus High Protein        05/21/25 1201    05/15/25 2310  May Not Participate in Room Service  ( ROOM SERVICE MAY NOT PARTICIPATE)  Once        Question:  .  Answer:  Yes    05/15/25 2309                  Estimated Needs:   Estimated Energy Needs  Total Energy Estimated Needs in 24 hours (kCal): 2076 kCal  Energy Estimated Needs per kg Body Weight in 24 hours (kCal/kg): 30 kCal/kg  Method for Estimating Needs: actual wt    Estimated Protein Needs  Total Protein Estimated Needs in 24 Hours (g): 86 g  Protein Estimated Needs per kg Body Weight in 24 Hours (g/kg): 1.25 g/kg  Method for Estimating 24 Hour Protein Needs: actual wt    Estimated Fluid Needs  Total Fluid Estimated Needs in 24 Hours (mL): 2076 mL  Total Fluid Estimated Needs in 24 hours (mL/kg): 30 mL/kg  Method for Estimating 24 Hour Fluid Needs: actual wt  Patient on Order Fluid Restriction: No       Nutrition Diagnosis   Nutrition Diagnosis:  Malnutrition Diagnosis  Patient has Malnutrition Diagnosis: Yes  Diagnosis Status: New  Malnutrition Diagnosis: Severe malnutrition related to chronic disease or condition  Related to: decreased ability to consume/tolerate sufficient  energy  As Evidenced by: 23# (13.1%) wt loss over ~2 months, po intake </= 75% estimated needs for >/= 1 month, mild/moderate subcutaneous fat loss and muscle wasting    Nutrition Diagnosis  Patient has Nutrition Diagnosis: Yes  Diagnosis Status (1): Resolved (diet ordered)  Nutrition Diagnosis 1: Difficulty chewing  Related to (1): dentition  As Evidenced by (1): pt is edentulous, brother requests soft diet       Nutrition Interventions/Recommendations   Nutrition Interventions and Recommendations:  Nutrition Prescription: Nutrition prescription for oral nutrition    Nutrition Recommendations:  Individualized Nutrition Prescription Provided for : continue soft/bite sized diet with ensure plus high protein TID to supplement po intake    Nutrition Interventions/Goals:   Food and/or Nutrient Delivery Interventions  Interventions: Meals and snacks, Medical food supplement  Meals and Snacks: Texture-modified diet  Goal: provide as ordered  Medical Food Supplement: Commercial beverage medical food supplement therapy  Goal: increase ensure plus high protein to TID to provide 350 kcals and 20g protein each    Education Documentation  No documentation found.           Nutrition Monitoring and Evaluation   Monitoring/Evaluation:   Food/Nutrient Related History Monitoring  Monitoring and Evaluation Plan: Estimated Energy Intake  Estimated Energy Intake: Energy intake greater or equal to 75% of estimated energy needs    Anthropometric Measurements  Monitoring and Evaluation Plan: Body weight  Body Weight: Body weight - Maintain stable weight    Goal Status: Some progress toward goal(s)    Follow Up  Time Spent (min): 30 minutes  Last Date of Nutrition Visit: 05/21/25  Nutrition Follow-Up Needed?: 5-7 days  Follow up Comment: 5/28/25            [1] atorvastatin, 40 mg, oral, Nightly  finasteride, 5 mg, oral, Daily  FLUoxetine, 10 mg, oral, Daily  furosemide, 20 mg, oral, Daily  heparin (porcine), 5,000 Units, subcutaneous, q8h  YARELIS  lidocaine, 5 mL, infiltration, Once  lidocaine, 5 mL, infiltration, Once  pantoprazole, 40 mg, oral, Daily before breakfast  piperacillin-tazobactam, 3.375 g, intravenous, q6h  potassium chloride CR, 10 mEq, oral, Daily with breakfast  sennosides-docusate sodium, 2 tablet, oral, Nightly  tamsulosin, 0.4 mg, oral, Daily  traZODone, 50 mg, oral, Nightly  zinc oxide, 1 Application, Topical, BID     [2]

## 2025-05-21 NOTE — PROGRESS NOTES
Department of Anesthesiology  Preprocedure Note       Name:  Shital Aguilar   Age:  78 y.o.  :  1953                                          MRN:  [id number]         Date:  7/15/2019      Surgeon: Kayleigh Cortez):  Hortencia Riley Physical Therapy    Physical Therapy Treatment    Patient Name: Jabari Dumas  MRN: [id number]  Department: [address]  Room: [address] [place] [address]  Today's Date: 5/21/2025  Time Calculation  Start Time: 1007  Stop Time: 1025  Time Calculation (min): 18 min         Assessment/Plan   PT Assessment  PT Assessment Results: Decreased strength, Decreased range of motion, Decreased endurance, Impaired balance, Decreased mobility, Decreased coordination, Decreased cognition, Impaired judgement, Decreased safety awareness, Impaired vision, Impaired tone, Decreased skin integrity, Pain  Rehab Prognosis: Poor  Barriers to Discharge Home: Caregiver assistance, Cognition needs, Physical needs  Caregiver Assistance: Caregiver assistance needed per identified barriers - however, level of patient's required assistance exceeds assistance available at home  Cognition Needs: 24hr supervision for safety awareness needed, Insight of patient limited regarding functional ability/needs, Cognition-related high falls risk  Physical Needs: 24hr mobility assistance needed, High falls risk due to function or environment  Evaluation/Treatment Tolerance: Other (Comment) (Limited)  Medical Staff Made Aware: Yes  Strengths: Support of Caregivers  Barriers to Participation: Comorbidities, Ability to acquire knowledge  End of Session Communication: Bedside nurse  Assessment Comment: Patient continues poor activity tolerance.Dependent x 2 for rolling today. Unable to participate with therapy today. Pending cogintion to continue therapy. Trial with OT ?  End of Session Patient Position: Bed, 3 rail up, Alarm on (Needs at reach)     PT Plan  Treatment/Interventions: Bed mobility, Positioning  PT Plan: Ongoing PT  PT Frequency: 3 times per week  PT Discharge Recommendations: Moderate intensity level of continued care  Equipment Recommended upon Discharge:  (TBD)  PT Recommended Transfer Status: Assist x2  PT - OK to Discharge: Yes    PT Visit Info:  PT Received On:  05/21/25     General Visit Information:   General  Reason for Referral: impaired mobility,acute kidney injury  Referred By: Dr Ramos  Past Medical History Relevant to Rehab: SSS s/p pacer, CAD, CHF, CABG 2017, open wound Lt thigh, CKD 3, dementia w/ behavioral disturbances, COPD, anemia, GI bleed, anxiety, HTN, UTI, Lt hip fx s/p hemiarthroplasty via anterior approach  3/9/25  Prior to Session Communication: Bedside nurse  Patient Position Received: Bed, 3 rail up, Alarm on  Preferred Learning Style: kinesthetic, verbal  General Comment: Cleared by nurse to see. Assist with bina hygeine care.    Subjective   Precautions:  Precautions  Hearing/Visual Limitations: eyes close throughout most of session  LE Weight Bearing Status: Weight Bearing as Tolerated (L LE)  Medical Precautions: Fall precautions  Post-Surgical Precautions: Other (comment) (L anterior hemiarthroplasty 3/9/2025)  Precautions Comment: Recent L anterior hip hemiarthroplasty 3/9/2025            Objective   Pain:  Pain Assessment  0-10 (Numeric) Pain Score: 0 - No pain  Cognition:  Cognition  Orientation Level: Disoriented to place, Disoriented to time, Disoriented to situation  Insight: Severe  Coordination:     Postural Control:  Postural Control  Posture Comment: guarded in flexed postion at UEs and LEs, trunk, c-spine, difficult to extend any extremity w/ assist.  Extremity/Trunk Assessments:    Activity Tolerance:  Activity Tolerance  Endurance: Decreased tolerance for upright activites  Activity Tolerance Comments: poor  Treatments:  Therapeutic Exercise  Therapeutic Exercise Performed: Yes (unable to follow commands)  Therapeutic Exercise Activity 1: B LE heelslides x 3 limited with patient resisting ROM    Therapeutic Activity  Therapeutic Activity Performed: Yes  Therapeutic Activity 1: Rolling right/ left for bina hygeine care with bed linen change x 2 trials dependent x 2 for rolling    Bed Mobility  Bed Mobility: Yes  Bed Mobility 1  Bed  Mobility 1: Rolling right, Rolling left  Level of Assistance 1: Dependent  Bed Mobility Comments 1: Dep rolling    Outcome Measures:  [facility] Basic Mobility  Turning from your back to your side while in a flat bed without using bedrails: Total  Moving from lying on your back to sitting on the side of a flat bed without using bedrails: Total  Moving to and from bed to chair (including a wheelchair): Total  Standing up from a chair using your arms (e.g. wheelchair or bedside chair): Total  To walk in hospital room: Total  Climbing 3-5 steps with railing: Total  Basic Mobility - Total Score: 6    Education Documentation  Mobility Training, taught by Mary Ann Blackwell PTA at 5/21/2025 11:50 AM.  Learner: Patient  Readiness: Acceptance  Method: Explanation  Response: No Evidence of Learning  Comment: Dep x 2 to roll    Education Comments  No comments found.        OP EDUCATION:       Encounter Problems       Encounter Problems (Active)       Mobility       STG - Patient will ambulate 5' w/ mod assist of 2 and LRAD (Progressing)       Start:  05/16/25    Expected End:  05/30/25            Pt will consistently participate in LE exercises and/or therapeutic activities to promote functional strength, ROM, balance and safe mobility (Progressing)       Start:  05/16/25    Expected End:  05/30/25               PT Transfers       STG - Patient to transfer to and from sit to supine w/ mod assist or less (Progressing)       Start:  05/16/25    Expected End:  05/30/25            STG - Patient will transfer sit to and from stand w/ mod assist or < (Progressing)       Start:  05/16/25    Expected End:  05/30/25               Pain - Adult

## 2025-05-21 NOTE — PROGRESS NOTES
"Jabari Dumas is a 84 y.o. male on day 6 of admission presenting with Acute kidney injury.    Subjective   Interval History:   Brother at bedside-Discussed line placement   Afebrile, no chills  Nursing at bedside  No new complaints  No pain    Objective   Range of Vitals (last 24 hours)  Heart Rate:  [64-87]   Temp:  [36 °C (96.8 °F)-36.8 °C (98.2 °F)]   Resp:  [15-20]   BP: ()/(46-86)   SpO2:  [96 %]   Daily Weight  05/15/25 : 69.2 kg (152 lb 8.9 oz)    Body mass index is 21.28 kg/m².    Physical Exam  Constitutional:       General: He is awake.   HENT:      Head: Normocephalic and atraumatic.   Cardiovascular:      Rate and Rhythm: Normal rate and regular rhythm.  Pulmonary:      Effort: Pulmonary effort is normal.   Musculoskeletal:      Cervical back: Normal range of motion and neck supple.      Right lower leg: No edema.      Left lower leg: No edema.   Skin:     General: Skin is warm and dry.      Comments: Left anterior hip dressing   Neurological:      Mental Status: He is awake, confused.  Psychiatric:         Behavior: Behavior is cooperative.     Antibiotics  piperacillin-tazobactam - 3.375 gram/50 mL  PIPERACILLIN-TAZOBACTAM IV 3.375 G IN 50 ML D5W (ADV/MBP)    Relevant Results  Labs                    CrCl cannot be calculated (Patient's most recent lab result is older than the maximum 3 days allowed.).  No results found for: \"CRP\"  Microbiology  Susceptibility data from last 14 days.  Collected Specimen Info Organism Amikacin Amoxicillin/Clavulanate Ampicillin Ampicillin/Sulbactam Cefazolin Cefazolin (uncomplicated UTIs only) Ceftriaxone Cefuroxime (oral) Ciprofloxacin Clindamycin Erythromycin   05/15/25 Blood culture from Peripheral Venipuncture Staphylococcus capitis           S  S     Staphylococcus epidermidis              05/15/25 Blood culture from Peripheral Venipuncture Enterococcus faecalis    S             Staphylococcus capitis              05/15/25 Urine from Clean Catch/Voided " Proteus mirabilis  S  R  R  R  R  R  S  I  R       Collected Specimen Info Organism Gentamicin Gentamicin Synergy Levofloxacin Nitrofurantoin Oxacillin Penicillin Piperacillin/Tazobactam Tetracycline Trimethoprim/Sulfamethoxazole Vancomycin   05/15/25 Blood culture from Peripheral Venipuncture Staphylococcus capitis      S    R  S  S     Staphylococcus epidermidis             05/15/25 Blood culture from Peripheral Venipuncture Enterococcus faecalis   R     S    R  S     Staphylococcus capitis             05/15/25 Urine from Clean Catch/Voided Proteus mirabilis  S   R  R    S  R  R      Imaging  ECG 12 lead  Result Date: 5/17/2025  sinus Tachycardia with frequent PACs, 1 st degree AV block. Right bundle branch block Possible Lateral infarct , age undetermined Abnormal ECG Confirmed by Jeremy Hill ([id number]) on 5/17/2025 9:05:23 AM    CT chest abdomen pelvis w IV contrast  Result Date: 5/15/2025  Interpreted By:  Jayant Quintero, STUDY: CT CHEST ABDOMEN PELVIS W IV CONTRAST;  5/15/2025 8:21 pm   INDICATION: Signs/Symptoms:fall.     COMPARISON: CT chest abdomen and pelvis 03/08/2025   ACCESSION NUMBER(S): [id number]   ORDERING CLINICIAN: CARLOS MYRICK   TECHNIQUE: Contiguous axial images of the chest, abdomen, and pelvis were obtained after the intravenous administration of 75 mL Omnipaque 350 contrast. Coronal and sagittal reformatted images were reconstructed from the axial data.   FINDINGS: CT CHEST:   MEDIASTINUM AND LYMPH NODES: Enlarged, heterogeneous appearance of the left lobe of the thyroid measuring up to 4.7 cm. This extends into the substernal space. Partially visualized enlarged left cervical lymph node measuring up to 3 cm. No mediastinal or axillary lymphadenopathy identified. No pneumomediastinum. The esophagus appears within normal limits. There is a moderate-to-large hiatal hernia.   HEART: Normal size. Extensive triple-vessel coronary vascular calcifications. No significant pericardial effusion. Pacing  leads extend into the right atrium and right ventricle.   VESSELS: Normal caliber aorta without dissection. Moderate calcifications of the aortic arch and descending thoracic aorta. The main pulmonary artery is normal in diameter.   LUNG, AIRWAYS, PLEURA: There is a small amount of debris within the mid to distal trachea. Assessment of the pulmonary parenchyma limited by respiratory motion. Dependent patchy and consolidative opacities in the bilateral lung bases. No pleural effusion or pneumothorax.   CHEST WALL SOFT TISSUES: No discernible abnormality. Left pectoral generator pack.   OSSEOUS STRUCTURES: No acute osseous abnormality. Median sternotomy changes.     CT ABDOMEN/PELVIS:   LIVER: Nodular surface morphology of the liver suggesting cirrhosis. 1.1 cm hypodensity in the lateral left hepatic lobe, probably a cyst.   BILE DUCTS: No significant intrahepatic or extrahepatic dilatation.   GALLBLADDER: Cholecystectomy.   PANCREAS: No significant abnormality.   SPLEEN: No significant abnormality.   ADRENALS: No significant abnormality.   KIDNEYS, URETERS, BLADDER: 3.5 cm right renal cyst. No hydronephrosis or hydroureter. No obstructing renal or ureteral calculus. Dependent density in the right aspect of the urinary bladder which may represent a mural calcification or intraluminal calculus.   REPRODUCTIVE ORGANS: No significant abnormality.   GI: Moderate-to-large hiatal hernia. No bowel obstruction. No appreciable bowel inflammation within the limitations of the motion. Moderate-to-large amount of stool within the rectum. The appendix is not visualized.   VESSELS: No aortic aneurysm. Heavy atherosclerotic calcifications of the abdominal aorta and iliac vessels. The portal veins and IVC patent.   PERITONEUM/RETROPERITONEUM: No intraperitoneal free air or fluid.   LYMPH NODES: No enlarged lymph nodes.   ABDOMINAL WALL: Soft tissue defect/wound overlying the left inguinal region with adjacent subcutaneous air and fat  stranding.   OSSEOUS STRUCTURES: Status post left total hip arthroplasty. Moderate-to-severe degenerative changes of the right hip.       1. No acute traumatic abnormality identified in the chest, abdomen, or pelvis. 2. Dependent patchy and consolidative opacities in the bilateral lung bases concerning for infectious or inflammatory process, including aspiration. There is also likely a component of volume loss. 3. Enlarged heterogeneous appearance of the left lobe of the thyroid in partially visualized enlarged left cervical lymph node. Follow-up ultrasound recommended for further assessment. 4. Nodular contour of the liver, suggestive of cirrhosis. Correlate with hepatic function tests. 5. Prominent soft tissue defect overlying the left inguinal region/upper thigh with surrounding fat stranding and adjacent air locules. Correlate for recent intervention or evidence of infection.   MACRO: None   Signed by: Jayant Quintero 5/15/2025 8:55 PM Dictation workstation:   [id number]    CT head wo IV contrast  Result Date: 5/15/2025  Interpreted By:  Jayant Quintero, STUDY: CT HEAD WO IV CONTRAST; CT CERVICAL SPINE WO IV CONTRAST;  5/15/2025 8:13 pm   INDICATION: Signs/Symptoms:fall; Signs/Symptoms:Fall altered mental status     COMPARISON: CT head and cervical spine 03/08/2025   ACCESSION NUMBER(S): [id number]; [id number]   ORDERING CLINICIAN: CARLOS MYRICK   TECHNIQUE: Axial noncontrast CT images of head with coronal and sagittal reconstructed images. Axial noncontrast CT images of the cervical spine with coronal and sagittal reconstructed images.   FINDINGS: CT HEAD:   Motion limits assessment.   BRAIN PARENCHYMA: Generalized cerebral volume loss. Gray-white differentiation is maintained. Patchy periventricular and subcortical white matter hypodensities, nonspecific but often seen in the setting of chronic microangiopathic change. No mass-effect, midline shift or effacement of cerebral sulci.   HEMORRHAGE: No acute  intracranial hemorrhage.   VENTRICLES and EXTRA-AXIAL SPACES: The ventricles and sulci are within normal limits for brain volume. No abnormal extra-axial fluid collection.   ORBITS: The visualized orbits and globes are within normal limits.   EXTRACRANIAL SOFT TISSUES: Within normal limits.   PARANASAL SINUSES/MASTOIDS: Scattered opacification of the ethmoid air cells. The mastoid air cells are patent.   CALVARIUM: No definite calvarial fracture identified within the limitations of motion.     CT CERVICAL SPINE:   CRANIOCERVICAL JUNCTION: Intact.   ALIGNMENT: No traumatic malalignment or traumatic facet widening. Mild reversal of the cervical lordotic curvature centered at C3-C4. Degenerative grade 1 retrolisthesis of C5 on C6.   VERTEBRAE/DISC SPACES: No acute fracture. Vertebral body heights are maintained. Moderate-to-severe multilevel disc space height loss and associated degenerative endplate changes, greatest at C3-C4 and C5-C6. Moderate multilevel facet arthrosis bilaterally. No high grade spinal canal stenosis evident by CT.   SOFT TISSUES: Within normal limits.   OTHER: The visualized lung apices are clear. Enlarged heterogeneous appearance of the left lobe of the thyroid measuring up to 4.8 cm. There is an enlarged lymph node in the upper left jugular chain, inferior to the parotid gland, measuring up to 3 cm (series 7, image 48).       CT HEAD: 1. Assessment limited by motion. 2. No definite acute intracranial abnormality within this limitation.     CT CERVICAL SPINE: 1. No acute fracture or traumatic malalignment of the cervical spine. 2. Moderate-to-severe degenerative changes of the cervical spine. 3. Enlarged heterogeneous appearance of the left lobe of the thyroid. Follow-up ultrasound should be considered for further assessment. 4. Enlarged lymph node in the upper left jugular chain measuring up to 3 cm. Appearance is concerning for malignant/metastatic process.   MACRO: None   Signed by: Jayant  Leon 5/15/2025 8:41 PM Dictation workstation:   [id number]    CT cervical spine wo IV contrast  Result Date: 5/15/2025  Interpreted By:  Jayant Quintero, STUDY: CT HEAD WO IV CONTRAST; CT CERVICAL SPINE WO IV CONTRAST;  5/15/2025 8:13 pm   INDICATION: Signs/Symptoms:fall; Signs/Symptoms:Fall altered mental status     COMPARISON: CT head and cervical spine 03/08/2025   ACCESSION NUMBER(S): [id number]; [id number]   ORDERING CLINICIAN: CARLOS MYRICK   TECHNIQUE: Axial noncontrast CT images of head with coronal and sagittal reconstructed images. Axial noncontrast CT images of the cervical spine with coronal and sagittal reconstructed images.   FINDINGS: CT HEAD:   Motion limits assessment.   BRAIN PARENCHYMA: Generalized cerebral volume loss. Gray-white differentiation is maintained. Patchy periventricular and subcortical white matter hypodensities, nonspecific but often seen in the setting of chronic microangiopathic change. No mass-effect, midline shift or effacement of cerebral sulci.   HEMORRHAGE: No acute intracranial hemorrhage.   VENTRICLES and EXTRA-AXIAL SPACES: The ventricles and sulci are within normal limits for brain volume. No abnormal extra-axial fluid collection.   ORBITS: The visualized orbits and globes are within normal limits.   EXTRACRANIAL SOFT TISSUES: Within normal limits.   PARANASAL SINUSES/MASTOIDS: Scattered opacification of the ethmoid air cells. The mastoid air cells are patent.   CALVARIUM: No definite calvarial fracture identified within the limitations of motion.     CT CERVICAL SPINE:   CRANIOCERVICAL JUNCTION: Intact.   ALIGNMENT: No traumatic malalignment or traumatic facet widening. Mild reversal of the cervical lordotic curvature centered at C3-C4. Degenerative grade 1 retrolisthesis of C5 on C6.   VERTEBRAE/DISC SPACES: No acute fracture. Vertebral body heights are maintained. Moderate-to-severe multilevel disc space height loss and associated degenerative endplate changes,  greatest at C3-C4 and C5-C6. Moderate multilevel facet arthrosis bilaterally. No high grade spinal canal stenosis evident by CT.   SOFT TISSUES: Within normal limits.   OTHER: The visualized lung apices are clear. Enlarged heterogeneous appearance of the left lobe of the thyroid measuring up to 4.8 cm. There is an enlarged lymph node in the upper left jugular chain, inferior to the parotid gland, measuring up to 3 cm (series 7, image 48).       CT HEAD: 1. Assessment limited by motion. 2. No definite acute intracranial abnormality within this limitation.     CT CERVICAL SPINE: 1. No acute fracture or traumatic malalignment of the cervical spine. 2. Moderate-to-severe degenerative changes of the cervical spine. 3. Enlarged heterogeneous appearance of the left lobe of the thyroid. Follow-up ultrasound should be considered for further assessment. 4. Enlarged lymph node in the upper left jugular chain measuring up to 3 cm. Appearance is concerning for malignant/metastatic process.   MACRO: None   Signed by: Jayant Quintero 5/15/2025 8:41 PM Dictation workstation:   [id number]         Assessment/Plan   Encephalopathy, toxic metabolic-Underlying Dementia, resolving   Proteus urinary tract infection, susceptible to Zosyn, Rocephin  Postop left hip wound infection, recent culture grew Proteus, repeat culture no growth   Chronic kidney disease stage III  Enterococcus Bacteremia-susceptible to ampicillin, vancomycin    Positive blood culture- , Staphylococcus epidermidis -contaminant          IV Zosyn-cover Enterococcus, proteus   Follow up Repeat blood cultures-5/18/2025-negative 48 hours   Monitor renal function  Local care-orders placed   Monitor temperature and WBC  Supportive care  Mid verses PICC line placement    Long term plan IV zosyn till 5/30/2025  Weekly CBC with diff, CMP       Virginia Fernandez, APRN-CNP

## 2025-05-21 NOTE — CARE PLAN
The patient's goals for the shift include  unable to express realistic goals; same as RN.     The clinical goals for the shift include monitor labs and VS, re-orient as needed, encourage oral intake/feed, Q2 turns, IV antibx, promote safe environment, PT/OT, promote rest, discharge planning.    No barriers toward meeting these goals. Call light and personal belongings within reach, fall risk interventions in place. Plan of care ongoing, no additional needs at this time.       Problem: Safety - Adult  Goal: Free from fall injury  Outcome: Progressing     Problem: Discharge Planning  Goal: Discharge to home or other facility with appropriate resources  Outcome: Progressing     Problem: Chronic Conditions and Co-morbidities  Goal: Patient's chronic conditions and co-morbidity symptoms are monitored and maintained or improved  Outcome: Progressing     Problem: Nutrition  Goal: Nutrient intake appropriate for maintaining nutritional needs  Outcome: Progressing     Problem: Fall/Injury  Goal: Not fall by end of shift  Outcome: Progressing  Goal: Be free from injury by end of the shift  Outcome: Progressing  Goal: Verbalize understanding of personal risk factors for fall in the hospital  Outcome: Progressing  Goal: Verbalize understanding of risk factor reduction measures to prevent injury from fall in the home  Outcome: Progressing  Goal: Use assistive devices by end of the shift  Outcome: Progressing  Goal: Pace activities to prevent fatigue by end of the shift  Outcome: Progressing     Problem: Deep Vein Thrombosis  Goal: I will remain free from complications of deep vein thrombosis and maintain current level of mobility  Outcome: Progressing     Problem: Skin  Goal: Decreased wound size/increased tissue granulation at next dressing change  Outcome: Progressing  Goal: Participates in plan/prevention/treatment measures  Outcome: Progressing  Goal: Prevent/manage excess moisture  Outcome: Progressing  Goal:  Prevent/minimize sheer/friction injuries  Outcome: Progressing  Goal: Promote/optimize nutrition  Outcome: Progressing  Goal: Promote skin healing  Outcome: Progressing     Problem: Pain  Goal: Takes deep breaths with improved pain control throughout the shift  Outcome: Progressing  Goal: Turns in bed with improved pain control throughout the shift  Outcome: Progressing  Goal: Walks with improved pain control throughout the shift  Outcome: Progressing  Goal: Performs ADL's with improved pain control throughout shift  Outcome: Progressing  Goal: Participates in PT with improved pain control throughout the shift  Outcome: Progressing  Goal: Free from opioid side effects throughout the shift  Outcome: Progressing  Goal: Free from acute confusion related to pain meds throughout the shift  Outcome: Progressing     Problem: Pain - Adult  Goal: Verbalizes/displays adequate comfort level or baseline comfort level  Outcome: Progressing

## 2025-05-21 NOTE — ASSESSMENT & PLAN NOTE
Hiatal hernia  Cyclic vomiting  Cirrhosis of the liver on CAT scan  Bilateral pneumonia  Possible aspiration pneumonia  Dementia  Chronic kidney disease stage IIIb  Constipation  UTI with Proteus Mirabella's.  Bacteremia with Staphylococcus epidermidis and Staphylococcus capitis and Enterococcus faecalis.  Plan: Continue current medication.  Supportive care.  Patient was started on broad-spectrum IV antibiotics.  For pneumonia or UTI.  Check urine culture, blood culture and sputum culture.  Give aerosol treatment.  Monitor pulse ox.  Local wound care for the left inguinal wound.  Consult wound care.  Consult ID and pulmonary.  Monitor renal function panel.  Monitor electrolytes and replete as needed.  Patient mild hypokalemia.  Patient had diarrhea.  Continue the present OmniHIB dose.  Check ammonia level.  Patient anxiety and agitation.  Patient does not aggressive towards the staff normal.  Start patient on Zyprexa as needed.  Medication reviewed and ordered.  We will take Du[place], fall, aspiration, decubitus, and DVT precautions    Date of service: 5/17/2025  Plan: Continue current medication.  Continue with IV antibiotics.  Supportive care.  ID is on consult.  Cardiology and ID input appreciated.  Good supportive care.  Monitor CBC and BMP.  Increase activity.  We will take DVT, fall, aspiration, decubitus, DVT precaution.  Check 2D echo.    Date of service: 5/18/2025    Plan: Continue current medication.  Supportive care.  Physical therapy.  Continue IV antibiotic.  Patient is slightly better.  ID input appreciated.  We will take DVT, fall, aspiration, decubitus, DVT precautions.    The date of service: 5/19/2025        Continue current medication.  Continue IV antibiotics.  Patient is more alert now.  Patient still confused.  No headache or dizziness.  No nausea vomiting.  Generalized weakness but no focal weakness numbness.  ID input appreciated.  Culture report is pending.  And change antibiotics according to  culture report and patient can be discharged once culture report is available and antibiotics can be determined.    Date of service: 5/20/2025    Plan: Continue current medication.  Continue current medication pending given antibiotics.  Patient has dementia.  Continue supportive care.  Patient is on IV vancomycin, Zosyn and Rocephin.  Patient will have a PICC line placed when blood cultures negative for 48 hours.  For that patient can have PICC line can be discharged back to extended-care facility.

## 2025-05-21 NOTE — CARE PLAN
Problem: Safety - Adult  Goal: Free from fall injury  Outcome: Progressing     Problem: Discharge Planning  Goal: Discharge to home or other facility with appropriate resources  Outcome: Progressing     Problem: Chronic Conditions and Co-morbidities  Goal: Patient's chronic conditions and co-morbidity symptoms are monitored and maintained or improved  Outcome: Progressing     Problem: Nutrition  Goal: Nutrient intake appropriate for maintaining nutritional needs  Outcome: Progressing     Problem: Fall/Injury  Goal: Not fall by end of shift  Outcome: Progressing  Goal: Be free from injury by end of the shift  Outcome: Progressing  Goal: Verbalize understanding of personal risk factors for fall in the hospital  Outcome: Progressing  Goal: Verbalize understanding of risk factor reduction measures to prevent injury from fall in the home  Outcome: Progressing  Goal: Use assistive devices by end of the shift  Outcome: Progressing  Goal: Pace activities to prevent fatigue by end of the shift  Outcome: Progressing     Problem: Deep Vein Thrombosis  Goal: I will remain free from complications of deep vein thrombosis and maintain current level of mobility  Outcome: Progressing     Problem: Skin  Goal: Decreased wound size/increased tissue granulation at next dressing change  Outcome: Progressing  Goal: Participates in plan/prevention/treatment measures  Outcome: Progressing  Goal: Prevent/manage excess moisture  Outcome: Progressing  Goal: Prevent/minimize sheer/friction injuries  Outcome: Progressing  Goal: Promote/optimize nutrition  Outcome: Progressing  Goal: Promote skin healing  Outcome: Progressing     Problem: Pain  Goal: Takes deep breaths with improved pain control throughout the shift  Outcome: Progressing  Goal: Turns in bed with improved pain control throughout the shift  Outcome: Progressing  Goal: Walks with improved pain control throughout the shift  Outcome: Progressing  Goal: Performs ADL's with improved  pain control throughout shift  Outcome: Progressing  Goal: Participates in PT with improved pain control throughout the shift  Outcome: Progressing  Goal: Free from opioid side effects throughout the shift  Outcome: Progressing  Goal: Free from acute confusion related to pain meds throughout the shift  Outcome: Progressing     Problem: Pain - Adult  Goal: Verbalizes/displays adequate comfort level or baseline comfort level  Outcome: Progressing   The patient's goals for the shift include  comfort    The clinical goals for the shift include safety    Over the shift, the patient did not make progress toward the following goals. Barriers to progression include none. Recommendations to address these barriers include none.

## 2025-05-22 VITALS
WEIGHT: 152.56 LBS | SYSTOLIC BLOOD PRESSURE: 125 MMHG | RESPIRATION RATE: 18 BRPM | HEART RATE: 72 BPM | BODY MASS INDEX: 21.36 KG/M2 | TEMPERATURE: 98.6 F | HEIGHT: 71 IN | OXYGEN SATURATION: 95 % | DIASTOLIC BLOOD PRESSURE: 60 MMHG

## 2025-05-22 PROBLEM — N17.9 ACUTE KIDNEY INJURY: Status: RESOLVED | Noted: 2025-05-15 | Resolved: 2025-05-22

## 2025-05-22 LAB
ANION GAP SERPL CALCULATED.3IONS-SCNC: 13 MMOL/L (ref 10–20)
BASOPHILS # BLD AUTO: 0.01 X10*3/UL (ref 0–0.1)
BASOPHILS NFR BLD AUTO: 0.2 %
BUN SERPL-MCNC: 20 MG/DL (ref 6–23)
CALCIUM SERPL-MCNC: 8.7 MG/DL (ref 8.6–10.3)
CHLORIDE SERPL-SCNC: 104 MMOL/L (ref 98–107)
CO2 SERPL-SCNC: 26 MMOL/L (ref 21–32)
CREAT SERPL-MCNC: 1.52 MG/DL (ref 0.5–1.3)
EGFRCR SERPLBLD CKD-EPI 2021: 45 ML/MIN/1.73M*2
EOSINOPHIL # BLD AUTO: 0.17 X10*3/UL (ref 0–0.4)
EOSINOPHIL NFR BLD AUTO: 2.6 %
ERYTHROCYTE [DISTWIDTH] IN BLOOD BY AUTOMATED COUNT: 16.1 % (ref 11.5–14.5)
GLUCOSE SERPL-MCNC: 98 MG/DL (ref 74–99)
HCT VFR BLD AUTO: 33.4 % (ref 41–52)
HGB BLD-MCNC: 10.3 G/DL (ref 13.5–17.5)
IMM GRANULOCYTES # BLD AUTO: 0.02 X10*3/UL (ref 0–0.5)
IMM GRANULOCYTES NFR BLD AUTO: 0.3 % (ref 0–0.9)
LYMPHOCYTES # BLD AUTO: 1.35 X10*3/UL (ref 0.8–3)
LYMPHOCYTES NFR BLD AUTO: 20.7 %
MCH RBC QN AUTO: 27.9 PG (ref 26–34)
MCHC RBC AUTO-ENTMCNC: 30.8 G/DL (ref 32–36)
MCV RBC AUTO: 91 FL (ref 80–100)
MONOCYTES # BLD AUTO: 0.81 X10*3/UL (ref 0.05–0.8)
MONOCYTES NFR BLD AUTO: 12.4 %
NEUTROPHILS # BLD AUTO: 4.15 X10*3/UL (ref 1.6–5.5)
NEUTROPHILS NFR BLD AUTO: 63.8 %
NRBC BLD-RTO: 0 /100 WBCS (ref 0–0)
PLATELET # BLD AUTO: 209 X10*3/UL (ref 150–450)
POTASSIUM SERPL-SCNC: 3.8 MMOL/L (ref 3.5–5.3)
RBC # BLD AUTO: 3.69 X10*6/UL (ref 4.5–5.9)
SODIUM SERPL-SCNC: 139 MMOL/L (ref 136–145)
WBC # BLD AUTO: 6.5 X10*3/UL (ref 4.4–11.3)

## 2025-05-22 PROCEDURE — 85025 COMPLETE CBC W/AUTO DIFF WBC: CPT | Performed by: INTERNAL MEDICINE

## 2025-05-22 PROCEDURE — 87081 CULTURE SCREEN ONLY: CPT | Mod: TRILAB | Performed by: NURSE PRACTITIONER

## 2025-05-22 PROCEDURE — 2500000002 HC RX 250 W HCPCS SELF ADMINISTERED DRUGS (ALT 637 FOR MEDICARE OP, ALT 636 FOR OP/ED): Performed by: INTERNAL MEDICINE

## 2025-05-22 PROCEDURE — 2500000001 HC RX 250 WO HCPCS SELF ADMINISTERED DRUGS (ALT 637 FOR MEDICARE OP): Performed by: INTERNAL MEDICINE

## 2025-05-22 PROCEDURE — 80048 BASIC METABOLIC PNL TOTAL CA: CPT | Performed by: INTERNAL MEDICINE

## 2025-05-22 PROCEDURE — 2500000004 HC RX 250 GENERAL PHARMACY W/ HCPCS (ALT 636 FOR OP/ED): Performed by: INTERNAL MEDICINE

## 2025-05-22 PROCEDURE — 2500000004 HC RX 250 GENERAL PHARMACY W/ HCPCS (ALT 636 FOR OP/ED): Mod: JZ | Performed by: NURSE PRACTITIONER

## 2025-05-22 RX ORDER — DOXYCYCLINE 100 MG/1
100 CAPSULE ORAL 2 TIMES DAILY
Qty: 20 CAPSULE | Refills: 0 | Status: SHIPPED | OUTPATIENT
Start: 2025-05-22

## 2025-05-22 RX ADMIN — PIPERACILLIN SODIUM AND TAZOBACTAM SODIUM 3.38 G: 3; .375 INJECTION, SOLUTION INTRAVENOUS at 08:32

## 2025-05-22 RX ADMIN — FUROSEMIDE 20 MG: 20 TABLET ORAL at 08:32

## 2025-05-22 RX ADMIN — PANTOPRAZOLE SODIUM 40 MG: 40 TABLET, DELAYED RELEASE ORAL at 06:29

## 2025-05-22 RX ADMIN — TAMSULOSIN HYDROCHLORIDE 0.4 MG: 0.4 CAPSULE ORAL at 08:32

## 2025-05-22 RX ADMIN — PIPERACILLIN SODIUM AND TAZOBACTAM SODIUM 3.38 G: 3; .375 INJECTION, SOLUTION INTRAVENOUS at 14:59

## 2025-05-22 RX ADMIN — SODIUM CHLORIDE, SODIUM LACTATE, POTASSIUM CHLORIDE, AND CALCIUM CHLORIDE 1000 ML: 600; 310; 30; 20 INJECTION, SOLUTION INTRAVENOUS at 09:50

## 2025-05-22 RX ADMIN — PIPERACILLIN SODIUM AND TAZOBACTAM SODIUM 3.38 G: 3; .375 INJECTION, SOLUTION INTRAVENOUS at 03:18

## 2025-05-22 RX ADMIN — ACETAMINOPHEN 650 MG: 160 SOLUTION ORAL at 08:32

## 2025-05-22 RX ADMIN — FLUOXETINE HYDROCHLORIDE 10 MG: 10 CAPSULE ORAL at 08:32

## 2025-05-22 RX ADMIN — Medication 1 APPLICATION: at 08:59

## 2025-05-22 RX ADMIN — HEPARIN SODIUM 5000 UNITS: 5000 INJECTION INTRAVENOUS; SUBCUTANEOUS at 05:28

## 2025-05-22 RX ADMIN — HEPARIN SODIUM 5000 UNITS: 5000 INJECTION INTRAVENOUS; SUBCUTANEOUS at 14:59

## 2025-05-22 RX ADMIN — FINASTERIDE 5 MG: 5 TABLET, FILM COATED ORAL at 08:32

## 2025-05-22 RX ADMIN — POTASSIUM CHLORIDE 10 MEQ: 750 TABLET, EXTENDED RELEASE ORAL at 08:32

## 2025-05-22 ASSESSMENT — PAIN SCALES - PAIN ASSESSMENT IN ADVANCED DEMENTIA (PAINAD)
BREATHING: NORMAL
BREATHING: NORMAL
CONSOLABILITY: DISTRACTED OR REASSURED BY VOICE/TOUCH
TOTALSCORE: 0
BREATHING: NORMAL
BREATHING: NORMAL
BODYLANGUAGE: RELAXED
CONSOLABILITY: NO NEED TO CONSOLE
NEGVOCALIZATION: OCCASIONAL MOAN/GROAN, LOW SPEECH, NEGATIVE/DISAPPROVING QUALITY
FACIALEXPRESSION: SMILING OR INEXPRESSIVE
TOTALSCORE: 1
BODYLANGUAGE: RELAXED
FACIALEXPRESSION: SMILING OR INEXPRESSIVE
BODYLANGUAGE: RELAXED
TOTALSCORE: 0
CONSOLABILITY: DISTRACTED OR REASSURED BY VOICE/TOUCH
TOTALSCORE: REPOSITIONED
TOTALSCORE: 2
TOTALSCORE: MEDICATION (SEE MAR);REPOSITIONED;REST
FACIALEXPRESSION: SMILING OR INEXPRESSIVE
FACIALEXPRESSION: SMILING OR INEXPRESSIVE
BODYLANGUAGE: RELAXED
CONSOLABILITY: NO NEED TO CONSOLE

## 2025-05-22 ASSESSMENT — PAIN - FUNCTIONAL ASSESSMENT
PAIN_FUNCTIONAL_ASSESSMENT: PAINAD (PAIN ASSESSMENT IN ADVANCED DEMENTIA SCALE)

## 2025-05-22 NOTE — NURSING NOTE
Attempted to call report to [facility] at this time, was sent to [employer]. Message left with callback number. Awaiting callback at this time. No additional needs.     1715:  Report called and left with receiving RN, Jolly, at [facility] at this time. Brother at bedside when [facility] arrived for pickup. Callback number left with facility should they have additional questions. No additional needs, patient en route.

## 2025-05-22 NOTE — PROGRESS NOTES
05/22/25 1032   Discharge Planning   Expected Discharge Disposition SNF   Does the patient need discharge transport arranged? Yes   RoundTrip coordination needed? Yes   Has discharge transport been arranged? No     [facility] has accepted the patient, but need the original PAS from [facility]. Alberto at [facility] made aware and will email the PAS [email or web address]. Candice at [facility] is aware of the same.     12:46 PM  Candice at [facility] confirmed they have received patient's PAS and he can come when medically appropriate. [facility] and [facility] sent to the for their records.     Transportation can be arranged when medically appropriate.     4:25 PM  Notified that patient is okay to be discharged this date. Transportation arranged via [facility] for pick-up at 5:00 PM. Nurse, facility and patient's brother updated to the same.

## 2025-05-22 NOTE — NURSING NOTE
Pt has a single lumen midline to R upper arm, drsg dry and intact (dated 5/21) without any redness, swelling or drainage, fluids infusing without any difficulty.

## 2025-05-22 NOTE — PROGRESS NOTES
Physical Therapy                 Therapy Communication Note    Patient Name: Jabari Dumas  MRN: [id number]  Department: [address]  Room: [address] [place], [place] [address]  Today's Date: 5/22/2025     Discipline: Physical Therapy    Missed Visit: PT Missed Visit: Yes     Missed Visit Reason: Missed Visit Reason: Cancel (Pt's BP low supine in bed per nurse; 75/45 on machine and 82/34 manually. Pt not following commands to actively participate with LE therex. Dependent boost up in bed. Pt not appropriate for PT.)    Missed Time: Cancel    Comment:

## 2025-05-22 NOTE — CARE PLAN
The patient's goals for the shift include  unable to express; same as RN.     The clinical goals for the shift include manage pain, monitor labs and VS, re-orient as needed, encourage oral intake/feed, Q2 turns, IV antibx, wound care, promote safe environment, PT/OT, promote rest, discharge planning.    No barriers, patient discharged. BP stabilized following IV fluids, Dr. Ramos called and updated regarding condition, OK to discharge per Rachel via telephone. Pickup scheduled for 5pm. Report to be called. No additional needs at this time.       Problem: Safety - Adult  Goal: Free from fall injury  Outcome: Adequate for Discharge     Problem: Discharge Planning  Goal: Discharge to home or other facility with appropriate resources  Outcome: Adequate for Discharge     Problem: Chronic Conditions and Co-morbidities  Goal: Patient's chronic conditions and co-morbidity symptoms are monitored and maintained or improved  Outcome: Adequate for Discharge     Problem: Nutrition  Goal: Nutrient intake appropriate for maintaining nutritional needs  Outcome: Adequate for Discharge     Problem: Fall/Injury  Goal: Not fall by end of shift  Outcome: Adequate for Discharge  Goal: Be free from injury by end of the shift  Outcome: Adequate for Discharge  Goal: Verbalize understanding of personal risk factors for fall in the hospital  Outcome: Adequate for Discharge  Goal: Verbalize understanding of risk factor reduction measures to prevent injury from fall in the home  Outcome: Adequate for Discharge  Goal: Use assistive devices by end of the shift  Outcome: Adequate for Discharge  Goal: Pace activities to prevent fatigue by end of the shift  Outcome: Adequate for Discharge     Problem: Deep Vein Thrombosis  Goal: I will remain free from complications of deep vein thrombosis and maintain current level of mobility  Outcome: Adequate for Discharge     Problem: Skin  Goal: Decreased wound size/increased tissue granulation at next  dressing change  Outcome: Adequate for Discharge  Goal: Participates in plan/prevention/treatment measures  Outcome: Adequate for Discharge  Goal: Prevent/manage excess moisture  Outcome: Adequate for Discharge  Goal: Prevent/minimize sheer/friction injuries  Outcome: Adequate for Discharge  Goal: Promote/optimize nutrition  Outcome: Adequate for Discharge  Goal: Promote skin healing  Outcome: Adequate for Discharge     Problem: Pain  Goal: Takes deep breaths with improved pain control throughout the shift  Outcome: Adequate for Discharge  Goal: Turns in bed with improved pain control throughout the shift  Outcome: Adequate for Discharge  Goal: Walks with improved pain control throughout the shift  Outcome: Adequate for Discharge  Goal: Performs ADL's with improved pain control throughout shift  Outcome: Adequate for Discharge  Goal: Participates in PT with improved pain control throughout the shift  Outcome: Adequate for Discharge  Goal: Free from opioid side effects throughout the shift  Outcome: Adequate for Discharge  Goal: Free from acute confusion related to pain meds throughout the shift  Outcome: Adequate for Discharge     Problem: Pain - Adult  Goal: Verbalizes/displays adequate comfort level or baseline comfort level  Outcome: Adequate for Discharge

## 2025-05-22 NOTE — ASSESSMENT & PLAN NOTE
Hiatal hernia  Cyclic vomiting  Cirrhosis of the liver on CAT scan  Bilateral pneumonia  Possible aspiration pneumonia  Dementia  Chronic kidney disease stage IIIb  Constipation  UTI with Proteus Mirabella's.  Bacteremia with Staphylococcus epidermidis and Staphylococcus capitis and Enterococcus faecalis.  Plan: Continue current medication.  Supportive care.  Patient was started on broad-spectrum IV antibiotics.  For pneumonia or UTI.  Check urine culture, blood culture and sputum culture.  Give aerosol treatment.  Monitor pulse ox.  Local wound care for the left inguinal wound.  Consult wound care.  Consult ID and pulmonary.  Monitor renal function panel.  Monitor electrolytes and replete as needed.  Patient mild hypokalemia.  Patient had diarrhea.  Continue the present OmniHIB dose.  Check ammonia level.  Patient anxiety and agitation.  Patient does not aggressive towards the staff normal.  Start patient on Zyprexa as needed.  Medication reviewed and ordered.  We will take [place], fall, aspiration, decubitus, and DVT precautions    Date of service: 5/17/2025  Plan: Continue current medication.  Continue with IV antibiotics.  Supportive care.  ID is on consult.  Cardiology and ID input appreciated.  Good supportive care.  Monitor CBC and BMP.  Increase activity.  We will take DVT, fall, aspiration, decubitus, DVT precaution.  Check 2D echo.    Date of service: 5/18/2025    Plan: Continue current medication.  Supportive care.  Physical therapy.  Continue IV antibiotic.  Patient is slightly better.  ID input appreciated.  We will take DVT, fall, aspiration, decubitus, DVT precautions.    The date of service: 5/19/2025        Continue current medication.  Continue IV antibiotics.  Patient is more alert now.  Patient still confused.  No headache or dizziness.  No nausea vomiting.  Generalized weakness but no focal weakness numbness.  ID input appreciated.  Culture report is pending.  And change antibiotics according to  culture report and patient can be discharged once culture report is available and antibiotics can be determined.    Date of service: 5/20/2025    Plan: Continue current medication.  Continue current medication pending given antibiotics.  Patient has dementia.  Continue supportive care.  Patient is on IV vancomycin, Zosyn and Rocephin.  Patient will have a PICC line placed when blood cultures negative for 48 hours.  For that patient can have PICC line can be discharged back to extended-care facility.    Date of service: 5/21/2024    Plan: Continue current medication.  Supportive care begin physical therapy and Occupational Therapy.  Monitor CBC and BMP.  Increase activity.  Blood culture was negative.  Patient had PICC line placed.  Possible discharge tomorrow to extended-care facility if arrangements are made.

## 2025-05-22 NOTE — NURSING NOTE
Wound care complete per orders; patient tolerated well. BP up since fluids started (see flowsheets). Patient being fed lunch at this time. Call light and personal belongings within reach, safety interventions in place. Plan of care ongoing.

## 2025-05-22 NOTE — PROGRESS NOTES
Jabari Dumas is a 84 y.o. male on day 6 of admission presenting with Acute kidney injury.    Subjective   The patient was seen and examined.  Lying in the bed very comfortable.  Alic in [place].  The patient denied any headache or dizziness.  Patient is very confused.       Objective     Physical Exam  HEENT:  Head externally atraumatic,  extraocular movements intact, oral mucosa moist  Neck:  Supple, no JVP, no palpable adenopathy or thyromegaly.  No carotid bruit.  Chest:  Clear to auscultation and resonant.  Heart:  Regular rate and rhythm, no murmur or gallop could be appreciated.  Abdomen:  Soft, nontender, bowel sounds present, normoactive, no palpable hepatosplenomegaly.  Extremities:  No edema, pulses present, no cyanosis or clubbing.  CNS:  Patient alert, oriented x 1.    No new deficit.  Cranial nerves 2-12 grossly intact  Skin:  No active rash.  Musculoskeletal:  No  apparent joint swelling or erythema, range of movement normal.  Last Recorded Vitals  Heart Rate:  [64-81]   Temp:  [36.2 °C (97.2 °F)-36.9 °C (98.4 °F)]   Resp:  [15-18]   BP: ()/(46-56)   SpO2:  [95 %-96 %]     Intake/Output last 3 Shifts:  I/O last 3 completed shifts:  In: 1110 (16 mL/kg) [P.O.:960; IV Piggyback:150]  Out: - (0 mL/kg)   Weight: 69.2 kg     Relevant Results  Susceptibility data from last 90 days.  Collected Specimen Info Organism Amikacin Amoxicillin/Clavulanate Ampicillin Ampicillin/Sulbactam Cefazolin Cefazolin (uncomplicated UTIs only) Ceftriaxone Cefuroxime (oral) Ciprofloxacin Clindamycin Erythromycin   05/15/25 Blood culture from Peripheral Venipuncture Staphylococcus capitis           S  S     Staphylococcus epidermidis              05/15/25 Blood culture from Peripheral Venipuncture Enterococcus faecalis    S             Staphylococcus capitis              05/15/25 Urine from Clean Catch/Voided Proteus mirabilis  S  R  R  R  R  R  S  I  R     03/09/25 Fluid from SPUTUM Methicillin Susceptible  Staphylococcus aureus (MSSA)           S  S   03/08/25 Urine from Clean Catch/Voided Escherichia coli   S  R  I  I  S  S   S       Collected Specimen Info Organism Gentamicin Gentamicin Synergy Levofloxacin Nitrofurantoin Oxacillin Penicillin Piperacillin/Tazobactam Tetracycline Tobramycin Trimethoprim/Sulfamethoxazole Vancomycin   05/15/25 Blood culture from Peripheral Venipuncture Staphylococcus capitis      S    R   S  S     Staphylococcus epidermidis              05/15/25 Blood culture from Peripheral Venipuncture Enterococcus faecalis   R     S     R  S     Staphylococcus capitis              05/15/25 Urine from Clean Catch/Voided Proteus mirabilis  S   R  R    S  R   R    03/09/25 Fluid from SPUTUM Methicillin Susceptible Staphylococcus aureus (MSSA)      S    S   S  S   03/08/25 Urine from Clean Catch/Voided Escherichia coli  R    S    S   I  S      No results found for this or any previous visit (from the past 24 hours).   Current Medications[1]   Assessment/Plan   Assessment & Plan  Acute kidney injury    Altered mental status    Acute cystitis with hematuria    Open wound of left hip    Abnormal CT scan  Hiatal hernia  Cyclic vomiting  Cirrhosis of the liver on CAT scan  Bilateral pneumonia  Possible aspiration pneumonia  Dementia  Chronic kidney disease stage IIIb  Constipation  UTI with Proteus Mirabella's.  Bacteremia with Staphylococcus epidermidis and Staphylococcus capitis and Enterococcus faecalis.  Plan: Continue current medication.  Supportive care.  Patient was started on broad-spectrum IV antibiotics.  For pneumonia or UTI.  Check urine culture, blood culture and sputum culture.  Give aerosol treatment.  Monitor pulse ox.  Local wound care for the left inguinal wound.  Consult wound care.  Consult ID and pulmonary.  Monitor renal function panel.  Monitor electrolytes and replete as needed.  Patient mild hypokalemia.  Patient had diarrhea.  Continue the present OmniHIB dose.  Check ammonia level.   Patient anxiety and agitation.  Patient does not aggressive towards the staff normal.  Start patient on Zyprexa as needed.  Medication reviewed and ordered.  We will take [place], fall, aspiration, decubitus, and DVT precautions    Date of service: 5/17/2025  Plan: Continue current medication.  Continue with IV antibiotics.  Supportive care.  ID is on consult.  Cardiology and ID input appreciated.  Good supportive care.  Monitor CBC and BMP.  Increase activity.  We will take DVT, fall, aspiration, decubitus, DVT precaution.  Check 2D echo.    Date of service: 5/18/2025    Plan: Continue current medication.  Supportive care.  Physical therapy.  Continue IV antibiotic.  Patient is slightly better.  ID input appreciated.  We will take DVT, fall, aspiration, decubitus, DVT precautions.    The date of service: 5/19/2025        Continue current medication.  Continue IV antibiotics.  Patient is more alert now.  Patient still confused.  No headache or dizziness.  No nausea vomiting.  Generalized weakness but no focal weakness numbness.  ID input appreciated.  Culture report is pending.  And change antibiotics according to culture report and patient can be discharged once culture report is available and antibiotics can be determined.    Date of service: 5/20/2025    Plan: Continue current medication.  Continue current medication pending given antibiotics.  Patient has dementia.  Continue supportive care.  Patient is on IV vancomycin, Zosyn and Rocephin.  Patient will have a PICC line placed when blood cultures negative for 48 hours.  For that patient can have PICC line can be discharged back to extended-care facility.    Date of service: 5/21/2024    Plan: Continue current medication.  Supportive care begin physical therapy and Occupational Therapy.  Monitor CBC and BMP.  Increase activity.  Blood culture was negative.  Patient had PICC line placed.  Possible discharge tomorrow to extended-care facility if arrangements are  made.           Ramon Ramos MD          [1]   Current Facility-Administered Medications:     acetaminophen (Tylenol) tablet 650 mg, 650 mg, oral, q4h PRN, 650 mg at 05/17/25 1204 **OR** acetaminophen (Tylenol) oral liquid 650 mg, 650 mg, oral, q4h PRN, 650 mg at 05/21/25 1133 **OR** acetaminophen (Tylenol) suppository 650 mg, 650 mg, rectal, q4h PRN, Ramon Ramos MD    alteplase (Cathflo Activase) injection 2 mg, 2 mg, intra-catheter, PRN, Virginia Fernandez, APRN-CNP    atorvastatin (Lipitor) tablet 40 mg, 40 mg, oral, Nightly, Ramon Ramos MD, 40 mg at 05/20/25 2113    benzocaine-menthol (Cepastat Sore Throat) lozenge 1 lozenge, 1 lozenge, Mouth/Throat, q2h PRN, Ramon Ramos MD    dextromethorphan-guaifenesin (Robitussin DM)  mg/5 mL oral liquid 5 mL, 5 mL, oral, q4h PRN, Ramon Ramos MD    finasteride (Proscar) tablet 5 mg, 5 mg, oral, Daily, Ramon Ramos MD, 5 mg at 05/21/25 0936    FLUoxetine (PROzac) capsule 10 mg, 10 mg, oral, Daily, Ramon Ramos MD, 10 mg at 05/21/25 0936    furosemide (Lasix) tablet 20 mg, 20 mg, oral, Daily, Ramon Ramos MD, 20 mg at 05/21/25 0936    guaiFENesin (Mucinex) 12 hr tablet 600 mg, 600 mg, oral, q12h PRN, Ramon Ramos MD    heparin (porcine) injection 5,000 Units, 5,000 Units, subcutaneous, q8h YARELIS, Ramon Ramos MD, 5,000 Units at 05/21/25 1506    ipratropium-albuteroL (Duo-Neb) 0.5-2.5 mg/3 mL nebulizer solution 3 mL, 3 mL, nebulization, q4h PRN, Ramon Ramos MD    lidocaine (Xylocaine) 10 mg/mL (1 %) injection 5 mL, 5 mL, infiltration, Once, MORIAH Turner    lidocaine (Xylocaine) 10 mg/mL (1 %) injection 5 mL, 5 mL, infiltration, Once, MORIAH Turner    melatonin tablet 6 mg, 6 mg, oral, Nightly PRN, Ramon Ramos MD, 6 mg at 05/20/25 2122    OLANZapine (ZyPREXA) injection 2.5 mg, 2.5 mg, intramuscular, q4h PRN, Ramon Ramos MD, 2.5 mg at 05/18/25 1949     ondansetron ODT (Zofran-ODT) disintegrating tablet 4 mg, 4 mg, oral, q8h PRN **OR** ondansetron (Zofran) injection 4 mg, 4 mg, intravenous, q8h PRN, Ramon Ramos MD    oxyCODONE (Roxicodone) immediate release tablet 5 mg, 5 mg, oral, q6h PRN, Ramon Ramos MD    pantoprazole (ProtoNix) EC tablet 40 mg, 40 mg, oral, Daily before breakfast, Ramon Ramos MD, 40 mg at 05/21/25 0621    piperacillin-tazobactam (Zosyn) 3.375 g in dextrose (iso) IV 50 mL, 3.375 g, intravenous, q6h, Virginia Fernandez, APRN-CNP, Stopped at 05/21/25 1511    polyethylene glycol (Glycolax, Miralax) packet 17 g, 17 g, oral, Daily PRN, Ramon Ramos MD    potassium chloride CR (Klor-Con) ER tablet 10 mEq, 10 mEq, oral, Daily with breakfast, Ramon Ramos MD, 10 mEq at 05/21/25 0936    sennosides-docusate sodium (Sarah-Colace) 8.6-50 mg per tablet 2 tablet, 2 tablet, oral, Nightly, Ramon Ramos MD, 2 tablet at 05/20/25 2113    tamsulosin (Flomax) 24 hr capsule 0.4 mg, 0.4 mg, oral, Daily, Ramon Ramos MD, 0.4 mg at 05/21/25 0936    traZODone (Desyrel) tablet 50 mg, 50 mg, oral, Nightly, Ramon Ramos MD, 50 mg at 05/20/25 2113    zinc oxide 40 % ointment 1 Application, 1 Application, Topical, BID, Ramon Ramos MD, 1 Application at 05/21/25 0946

## 2025-05-23 ENCOUNTER — NURSING HOME VISIT (OUTPATIENT)
Dept: POST ACUTE CARE | Facility: EXTERNAL LOCATION | Age: 85
End: 2025-05-23
Payer: MEDICARE

## 2025-05-23 DIAGNOSIS — I10 ESSENTIAL HYPERTENSION: ICD-10-CM

## 2025-05-23 DIAGNOSIS — F32.A DEPRESSION, UNSPECIFIED DEPRESSION TYPE: ICD-10-CM

## 2025-05-23 DIAGNOSIS — E78.5 DYSLIPIDEMIA: ICD-10-CM

## 2025-05-23 DIAGNOSIS — N40.0 BENIGN PROSTATIC HYPERPLASIA, UNSPECIFIED WHETHER LOWER URINARY TRACT SYMPTOMS PRESENT: ICD-10-CM

## 2025-05-23 DIAGNOSIS — M62.81 MUSCLE WEAKNESS (GENERALIZED): Primary | ICD-10-CM

## 2025-05-23 DIAGNOSIS — K59.00 CONSTIPATION, UNSPECIFIED CONSTIPATION TYPE: ICD-10-CM

## 2025-05-23 DIAGNOSIS — N30.00 ACUTE CYSTITIS WITHOUT HEMATURIA: ICD-10-CM

## 2025-05-23 DIAGNOSIS — M13.0 POLYARTHRITIS: ICD-10-CM

## 2025-05-23 DIAGNOSIS — K21.9 GASTROESOPHAGEAL REFLUX DISEASE, UNSPECIFIED WHETHER ESOPHAGITIS PRESENT: ICD-10-CM

## 2025-05-23 LAB
BACTERIA BLD CULT: NORMAL
BACTERIA BLD CULT: NORMAL

## 2025-05-23 PROCEDURE — 99310 SBSQ NF CARE HIGH MDM 45: CPT | Performed by: NURSE PRACTITIONER

## 2025-05-23 NOTE — DISCHARGE SUMMARY
Discharge Diagnosis  Acute kidney injury    Malnutrition Diagnosis Status: New  Malnutrition Diagnosis: Severe malnutrition related to chronic disease or condition  Related to: decreased ability to consume/tolerate sufficient energy  As Evidenced by: 23# (13.1%) wt loss over ~2 months, po intake </= 75% estimated needs for >/= 1 month, mild/moderate subcutaneous fat loss and muscle wasting  I agree with the dietitian's malnutrition diagnosis.        Issues Requiring Follow-Up  Acute kidney injury  Altered mental status  Acute cystitis with hematuria  Toxic and metabolic encephalopathy  Open wound the left hip.  Surgical wound infection  Hiatal hernia  Cyclic vomiting  Cirrhosis of the liver  Bilateral pneumonia  Possible aspiration pneumonia  Dementia  Chronic kidney disease stage IIIb  Constipation  UTI with Proteus mirabilis  Positive blood culture with Staphylococcus epidermidis and Enterococcus bacteremia    Discharge Meds     Medication List      START taking these medications     alteplase 2 mg injection; Commonly known as: Cathflo Activase; 2 mL (2   mg) by intra-catheter route if needed (Clotted IV line).   dextrose 5% piggyback 50 mL with piperacillin-tazobactam 3.375 gram   recon soln 3.375 g; Infuse 3.375 g into a venous catheter every 6 hours   for 10 days.   heparin (porcine) 5,000 unit/mL injection; Inject 1 mL (5,000 Units)   under the skin every 8 hours. For one month until mobile or cleared by   orthopedic surgery   piperacillin-tazobactam 3.375 gram/50 mL IV; Commonly known as: Zosyn;   Infuse 50 mL (3.375 g) over 0.5 hours into a venous catheter every 6   hours.     CONTINUE taking these medications     atorvastatin 40 mg tablet; Commonly known as: Lipitor   doxycycline 100 mg capsule; Commonly known as: Vibramycin; Take 1   capsule (100 mg) by mouth 2 times a day. Take with at least 8 ounces   (large glass) of water, do not lie down for 30 minutes after   finasteride 5 mg tablet; Commonly known  as: Proscar   FLUoxetine 10 mg capsule; Commonly known as: PROzac   oxyCODONE 5 mg immediate release tablet; Commonly known as: Roxicodone   pantoprazole 40 mg EC tablet; Commonly known as: ProtoNix; Take 1 tablet   (40 mg) by mouth once daily in the morning. Take before meals. Do not   crush, chew, or split.   sennosides-docusate sodium 8.6-50 mg tablet; Commonly known as:   Sarah-Colace; Take 2 tablets by mouth once daily at bedtime.   tamsulosin 0.4 mg 24 hr capsule; Commonly known as: Flomax   traZODone 50 mg tablet; Commonly known as: Desyrel   zinc oxide 40 % ointment ointment; Apply 1 Application topically 2 times   a day. Apply to groin and scrotum     STOP taking these medications     furosemide 20 mg tablet; Commonly known as: Lasix   furosemide 40 mg tablet; Commonly known as: Lasix   potassium chloride CR 10 mEq ER tablet; Commonly known as: Klor-Con       Test Results Pending At Discharge  Pending Labs       Order Current Status    Extra Urine Gray Tube Collected (05/15/25 5401)    Staphylococcus Aureus/MRSA Colonization, Culture - PICC Only In process    Urinalysis with Reflex Culture and Microscopic In process    Blood Culture Preliminary result    Blood Culture Preliminary result            Hospital Course   The patient was admitted with a complaint of change in mental status.  The patient was found to have acute renal failure.  Patient was also found to have a UTI and bacteremia with Staphylococcus epidermidis and tach office.  Patient was treated with IV antibiotics including ampicillin and vancomycin.  Local wound care was done.  Patient was given IV fluid.  Condition of the patient improved.  The patient mentation improved.  The patient was given physical therapy and Occupational Therapy as per the recommendation of PT/OT the patient has been sent to rehab for further management.    Pertinent Physical Exam At Time of Discharge  Physical Exam  HEENT: Head externally atraumatic, no pallor, no  icterus  Neck: Supple, no JVP, no palpable hepatosplenomegaly  Chest: Clear, decreased breath sound  Heart: Regular rate and rhythm, no murmur  Abdomen: Soft, nontender.:  Bowel sounds present, no palpable hepatosplenomegaly.  Extremities: No edema pulses present  CNS: No focal deficit.  Cranial nerves II to XII intact.  The patient has advanced dementia.  Moving all extremities.  Skin: Patient has a left hip surgical wound.  Outpatient Follow-Up  Future Appointments   Date Time Provider Department Center   5/30/2025  9:30 AM NARESH [id number]         Ramon Ramos MD

## 2025-05-24 LAB — STAPHYLOCOCCUS SPEC CULT: NORMAL

## 2025-05-25 ENCOUNTER — HOSPITAL ENCOUNTER (EMERGENCY)
Facility: HOSPITAL | Age: 85
Discharge: NURSING, REHAB OR LONG-TERM CARE | End: 2025-05-25
Payer: MEDICARE

## 2025-05-25 VITALS
SYSTOLIC BLOOD PRESSURE: 114 MMHG | OXYGEN SATURATION: 98 % | WEIGHT: 175.27 LBS | TEMPERATURE: 98.2 F | RESPIRATION RATE: 18 BRPM | DIASTOLIC BLOOD PRESSURE: 62 MMHG | HEART RATE: 68 BPM | BODY MASS INDEX: 24.54 KG/M2 | HEIGHT: 71 IN

## 2025-05-25 DIAGNOSIS — Z45.2 ENCOUNTER FOR ASSESSMENT OF PERIPHERALLY INSERTED CENTRAL CATHETER (PICC): Primary | ICD-10-CM

## 2025-05-25 PROCEDURE — 96365 THER/PROPH/DIAG IV INF INIT: CPT

## 2025-05-25 PROCEDURE — 2500000004 HC RX 250 GENERAL PHARMACY W/ HCPCS (ALT 636 FOR OP/ED): Mod: JZ

## 2025-05-25 PROCEDURE — 99284 EMERGENCY DEPT VISIT MOD MDM: CPT

## 2025-05-25 RX ADMIN — PIPERACILLIN SODIUM AND TAZOBACTAM SODIUM 3.38 G: 3; .375 INJECTION, SOLUTION INTRAVENOUS at 09:57

## 2025-05-25 ASSESSMENT — COLUMBIA-SUICIDE SEVERITY RATING SCALE - C-SSRS
6. HAVE YOU EVER DONE ANYTHING, STARTED TO DO ANYTHING, OR PREPARED TO DO ANYTHING TO END YOUR LIFE?: NO
1. IN THE PAST MONTH, HAVE YOU WISHED YOU WERE DEAD OR WISHED YOU COULD GO TO SLEEP AND NOT WAKE UP?: NO
2. HAVE YOU ACTUALLY HAD ANY THOUGHTS OF KILLING YOURSELF?: NO

## 2025-05-25 ASSESSMENT — PAIN - FUNCTIONAL ASSESSMENT: PAIN_FUNCTIONAL_ASSESSMENT: 0-10

## 2025-05-25 ASSESSMENT — PAIN SCALES - GENERAL: PAINLEVEL_OUTOF10: 0 - NO PAIN

## 2025-05-25 NOTE — ED PROVIDER NOTES
[facility]   No chief complaint on file.      Patient is an 84-year-old male with significant history of dementia ANO x 1 or 2 at baseline, surgical wound infection of the left hip, chronic kidney disease presents to the ED for pulling out his PICC line times last night.  Patient had PICC line placed 3 days ago after being admitted in the hospital with IV antibiotics.  Patient is needing IV antibiotics for sepsis and wound infection of the left hip.  Patient receives Zosyn every 6 hours.  They attempted to get an IV on the patient but were unsuccessful.  Patient has no new complaints.  Patient's brother provides most of the history who is also his POA.  Patient's brother denies any new fevers vomiting diarrhea.  Patient still tolerating p.o. intake normally.  No cough.  Vital signs have been stable patient is still at baseline.              Patient History   Medical History[1]  Surgical History[2]  Family History[3]  Social History[4]    Physical Exam   ED Triage Vitals   Temp Pulse Resp BP   -- -- -- --      SpO2 Temp src Heart Rate Source Patient Position   -- -- -- --      BP Location FiO2 (%)     -- --       Physical Exam  Cardiovascular:      Rate and Rhythm: Normal rate and regular rhythm.      Pulses: Normal pulses.   Pulmonary:      Effort: Pulmonary effort is normal.      Breath sounds: No wheezing, rhonchi or rales.   Abdominal:      Palpations: Abdomen is soft.      Tenderness: There is no abdominal tenderness. There is no guarding or rebound.   Skin:     Findings: Lesion (wound of left hip with packing no surrounding erythema) present.   Neurological:      Mental Status: He is alert. Mental status is at baseline.           ED Course & MDM   Diagnoses as of 05/25/25 0940   Encounter for assessment of peripherally inserted central catheter (PICC)                 No data recorded                                 Medical Decision Making  Medical Decision Making:  Patient presented as described in HPI. Patient case  including ROS, PE, and treatment and plan discussed with ED attending if attached as cosigner. Due to patients presentation orders completed include as documented.  Patient presents to the ED for pulling out his PICC line at the nursing home last night.  Patient had PICC line placed 3 days ago for IV antibiotics for surgical wound infection.  Patient received Zosyn every 6 hours.  Patient has no other complaints or changes from baseline.  Patient given Zosyn IV here.  Patient will be sent home with an IV and will wrap Coban around for protection so patient does not pull this out.  Patient remained stable and discharged.  Any worsening symptoms return.  Patient was advised to follow up with PCP or recommended provider in 2-3 days for another evaluation and exam. I advised patient/guardian to return or go to closest emergency room immediately if symptoms change, get worse, new symptoms develop prior to follow up. If there is no improvement in symptoms in the next 24 hours they are advised to return for further evaluation and exam. I also explained the plan and treatment course. Patient/guardian is in agreement with plan, treatment course, and follow up and states verbally that they will comply.        Patient care discussed with: N/A  Social Determinants affecting care: N/A    Final diagnosis and disposition as below.  See CI    Homegoing. I discussed the differential; results and discharge plan with the patient and/or family/friend/caregiver if present.  I emphasized the importance of follow-up with the physician I referred them to in the timeframe recommended.  I explained reasons for the patient to return to the Emergency Department. They agreed that if they feel their condition is worsening or if they have any other concern they should call 911 immediately for further assistance. I gave the patient an opportunity to ask all questions they had and answered all of them accordingly. They understand return precautions  and discharge instructions. The patient and/or family/friend/caregiver expressed understanding verbally and that they would comply.       Disposition:  Discharge        This note has been transcribed using voice recognition and may contain grammatical errors, misplaced words, incorrect words, incorrect phrases or other errors.          Procedure  Procedures       Teri Figueroa PA-C  05/25/25 0948         [1]   Past Medical History:  Diagnosis Date    Gastrointestinal hemorrhage 03/08/2025   [2]   Past Surgical History:  Procedure Laterality Date    CT GUIDED IMAGING FOR NEEDLE PLACEMENT  12/7/2017    CT GUIDED IMAGING FOR NEEDLE PLACEMENT [facility]   [3] No family history on file.  [4]   Social History  Tobacco Use    Smoking status: Former     Types: Cigarettes    Smokeless tobacco: Not on file   Substance Use Topics    Alcohol use: Not Currently    Drug use: Not on file        Teri Figueroa PA-C  05/25/25 0955

## 2025-05-27 NOTE — PROGRESS NOTES
*Provider Impression*    Patient is an 84 year old male who is seen today for management of multiple medical problems       #Weakness / OA - PT/OT, oxycodone 5mg q4h PRN, heparin 5000units TID until 6/21  #UTI / BPH - Zosyn 3.375grams q6h until 6/1, doxycycline 100mg BID, tamsulosin 0.4mg daily, finasteride 5mg daily  #HTN / HLD - atorvastatin 40mg QHS  #GERD / Constipation - senna-S 8.6/50mg daily, pantoprazole 40mg daily  #Depression - trazodone 50mg QHS, fluoxetine 10mg daily  #ACP - Full Code  Follow up as needed      *Chief Complaint*   UTI      *History of Present Illness*    Patient is an 83 y/o male w/ PMH as below who was admitted with a complaint of change in mental status.  The patient was found to have acute renal failure w/ UTI bacteremia with S epidermidis / capitis, and E faecalis. He was treated with IV antibiotics including ampicillin and vancomycin.  Local wound care was done.  He was given IV fluid with improvement. He was seen by PT/OT who recommended SNF. Once stable, he was d/c to [facility].    His admission labs appreciated as below.    He is seen sitting up in his room today and denies any pain, no f/c, sweats, cough, n/v, constipation, diarrhea. Nursing staff report he has lipoma. He is unreliable, but call and speak with son who says he's had it for 10 years.     Allergies - NKA  PMH - HTN, HLD, CHF, CAD, PUD, CKD3, BPH, Anemia, L femoral neck fx, dementia, PNA, COPD  PSH - L hip hemiarthroplasty, open cholecystectomy  FH - none reported  SocHx - Former smoker, No EtOH    *Review of Systems*  All other systems reviewed are negative except as noted in the HPI     *Vital Signs*   Date: 5/23/25  - T: 97.6  P: 87  R: 18  BP: 118/72  SpO2:      *Results / Data*  CBC - Date: 5/23/25  WBC: 5.50  HGB: 10.3  HCT: 32.9  PLT: 214  ;   BMP - Date: 5/23/25  Na: 139  K: 3.8  Cl: 105  CO2: 21  BUN: 16  Cr: 1.49  Glu: 80  Ca: 8.8  ;   LFT - Date: 5/23/25  AST: 18  ALT: 9  ALP: 100  Tbili: 0.4   ;   Coags - Date:   INR:   PT:    *Physical Exam*  Gen: (+) NAD, (+) well-appearing  HEENT: (+) normocephalic, (+) MMM  Neck: (+) supple  Lungs: (+) CTAB, (-) wheezes, (-) rales, (-) rhonchi  Heart: (+) RRR, (+) S1 S2, (-) murmurs  Pulses: (+) palpable  Abd: (+) soft, (+) NT, (+) ND, (+) BS+  Ext: (-) edema, (-) deformity  MSK: (-) joint swelling  Skin: (+) warm, (+) dry, (-) rash  Neuro: (+) follows commands, (-) tremor, (+) alert

## 2025-05-28 ENCOUNTER — NURSING HOME VISIT (OUTPATIENT)
Dept: POST ACUTE CARE | Facility: EXTERNAL LOCATION | Age: 85
End: 2025-05-28
Payer: MEDICARE

## 2025-05-28 DIAGNOSIS — N40.0 BENIGN PROSTATIC HYPERPLASIA, UNSPECIFIED WHETHER LOWER URINARY TRACT SYMPTOMS PRESENT: ICD-10-CM

## 2025-05-28 DIAGNOSIS — F32.A DEPRESSION, UNSPECIFIED DEPRESSION TYPE: ICD-10-CM

## 2025-05-28 DIAGNOSIS — F41.9 ANXIETY: ICD-10-CM

## 2025-05-28 DIAGNOSIS — N30.00 ACUTE CYSTITIS WITHOUT HEMATURIA: ICD-10-CM

## 2025-05-28 DIAGNOSIS — M62.81 MUSCLE WEAKNESS (GENERALIZED): Primary | ICD-10-CM

## 2025-05-28 DIAGNOSIS — K59.00 CONSTIPATION, UNSPECIFIED CONSTIPATION TYPE: ICD-10-CM

## 2025-05-28 DIAGNOSIS — E78.5 DYSLIPIDEMIA: ICD-10-CM

## 2025-05-28 DIAGNOSIS — S72.002S CLOSED FRACTURE OF NECK OF LEFT FEMUR, SEQUELA: ICD-10-CM

## 2025-05-28 DIAGNOSIS — K21.9 GASTROESOPHAGEAL REFLUX DISEASE, UNSPECIFIED WHETHER ESOPHAGITIS PRESENT: ICD-10-CM

## 2025-05-28 DIAGNOSIS — I10 ESSENTIAL HYPERTENSION: ICD-10-CM

## 2025-05-30 ENCOUNTER — HOSPITAL ENCOUNTER (OUTPATIENT)
Dept: RADIOLOGY | Facility: HOSPITAL | Age: 85
Discharge: HOME | End: 2025-05-30
Payer: MEDICARE

## 2025-05-30 ENCOUNTER — APPOINTMENT (OUTPATIENT)
Dept: ORTHOPEDIC SURGERY | Facility: CLINIC | Age: 85
End: 2025-05-30
Payer: MEDICARE

## 2025-05-30 DIAGNOSIS — Z98.890 S/P ORIF (OPEN REDUCTION INTERNAL FIXATION) FRACTURE: ICD-10-CM

## 2025-05-30 DIAGNOSIS — Z87.81 S/P ORIF (OPEN REDUCTION INTERNAL FIXATION) FRACTURE: ICD-10-CM

## 2025-05-30 PROCEDURE — 73502 X-RAY EXAM HIP UNI 2-3 VIEWS: CPT | Mod: LT

## 2025-05-30 PROCEDURE — 1111F DSCHRG MED/CURRENT MED MERGE: CPT

## 2025-05-30 PROCEDURE — 1159F MED LIST DOCD IN RCRD: CPT

## 2025-05-30 PROCEDURE — 99024 POSTOP FOLLOW-UP VISIT: CPT

## 2025-05-30 NOTE — PROGRESS NOTES
Wound still open but is dry and tissue underneth looks good, patient had sepsis recently and has been slowly recovering. Not able to get his PT in and has not beenw alking recently. Repacked his wound and told them to get back into wound care when they can, missed last few weeks due to sepsis  Chief Complaint   Patient presents with    Left Hip - Follow-up, Post-op      3/9/25 LT HIP HEMIARTHROPLASTY         This is a 84 y.o. male who is 11 weeks out from left hip hemiarthroplasty.  Pain is under control with current medications.  No drainage from his incision no fevers or chills.  Patient's brother states that patient recently had sepsis and is still recovering from that.  States he is unable to go to the last wound care visit but states there is still packing the wound and it still is doing okay.  Patient has not gotten up to walk quite some time since having sepsis.    Left hip examination: Surgical incision is well-healed at the superior and inferior aspects but still has his open wound in the center of it.  No drainage from the wound and packing is in place with a bandage over it.  The tissue underneath the wound looks good and the muscle looks pink without any signs of infection.  Unable to assess his range of motion as he is on a stretcher.  Neurovascular tact distally    X-rays of the hip were reviewed independently interpreted by me today, the show stable total hip arthroplasty no fracture dislocation or loosening    Impression plan: 84 y.o. male 11 weeks out from left hip hemiarthroplasty.  Patient is hanging in there with recent diagnosis of sepsis.  Told him to try make sure they get back to the wound care to continue managing his open wound.  Told that it appears to be looking good still but it does not seem to be closing up.  Told him to make sure that they put his sepsis and this wound at the top of the priority list and there is no need to follow-up with me until he gets back under his feet.

## 2025-06-02 ENCOUNTER — NURSING HOME VISIT (OUTPATIENT)
Dept: POST ACUTE CARE | Facility: EXTERNAL LOCATION | Age: 85
End: 2025-06-02
Payer: MEDICARE

## 2025-06-02 ENCOUNTER — APPOINTMENT (OUTPATIENT)
Dept: WOUND CARE | Facility: HOSPITAL | Age: 85
End: 2025-06-02
Payer: MEDICARE

## 2025-06-02 DIAGNOSIS — F03.918 DEMENTIA WITH BEHAVIORAL DISTURBANCE (MULTI): ICD-10-CM

## 2025-06-02 DIAGNOSIS — N18.32 STAGE 3B CHRONIC KIDNEY DISEASE (MULTI): ICD-10-CM

## 2025-06-02 DIAGNOSIS — N13.8 BENIGN PROSTATIC HYPERPLASIA WITH URINARY OBSTRUCTION: ICD-10-CM

## 2025-06-02 DIAGNOSIS — S71.002D OPEN WOUND OF LEFT HIP, SUBSEQUENT ENCOUNTER: ICD-10-CM

## 2025-06-02 DIAGNOSIS — S72.002A CLOSED LEFT HIP FRACTURE, INITIAL ENCOUNTER: Primary | ICD-10-CM

## 2025-06-02 DIAGNOSIS — N40.1 BENIGN PROSTATIC HYPERPLASIA WITH URINARY OBSTRUCTION: ICD-10-CM

## 2025-06-02 PROCEDURE — 99305 1ST NF CARE MODERATE MDM 35: CPT | Performed by: FAMILY MEDICINE

## 2025-06-02 NOTE — LETTER
Patient: Jabari Dumas  : 1940    Encounter Date: 2025    Jabari Dumas is a 84 y.o. male with Chief Complaint of SNF ([facility]) H&P    Resident seen 25 -- TAYLER    CC: SNF ([facility]) H&P    : 1940  SNF H&P done 25  DC Summary dated 25 reviewed 25  NKDA  Full Code      S: 83 yo man with CAD, hx CVA, PUD, recent left hip fx s/p HA (3/9/25) with infected surgical wound, and dementia admitted to SNF rehab after hospitalization for PCM, UTI treated with IV ABX and IVF. In ER 25 for replacement of IV access. No CP/SOB. Med list & problem list reviewed.     O: VSS AFEB 170# Awake, alert, NAD. Pleasantly confused.  Chest CTA. Heart RRR. Ext no c/c/e.  PEG intact.     LAB (25) Hgb 12.1, Alb 3, Na 146, K 3.6, Cr 1.36    A/P:  # Weakness: SNF PT/OT  # Dementia: ST  # Hx UTI: completed abx.  # PCM: TF per PEG, boost po.   # Femur fx s/p Left HA (3/9/25): f/u Dr Buckley, oxycodone prn  # Left thigh wound infection: continue doxycycline f/u ID Falguni.   # Dysphagia: Mech soft, thin.   # Hypokalemia: Start KCl 20 meq daily  # MDD w Anxiety: buspar, fluoxetine  # BPH with LUTS: finasteride, flomax  # GERD: PPI  # Insomnia: trazodone  # CAD: statin  # CKD 3b: monitor labs q Monday.       Surgical History[1]   Social History     Socioeconomic History   • Marital status:      Spouse name: Not on file   • Number of children: Not on file   • Years of education: Not on file   • Highest education level: Not on file   Occupational History   • Not on file   Tobacco Use   • Smoking status: Former     Types: Cigarettes   • Smokeless tobacco: Not on file   Substance and Sexual Activity   • Alcohol use: Not Currently   • Drug use: Not on file   • Sexual activity: Not on file   Other Topics Concern   • Not on file   Social History Narrative   • Not on file     Social Drivers of Health     Financial Resource Strain: Low Risk  (2025)    Overall Financial Resource Strain (CARDIA)    •  Difficulty of Paying Living Expenses: Not hard at all   Food Insecurity: No Food Insecurity (5/16/2025)    Hunger Vital Sign    • Worried About Running Out of Food in the Last Year: Never true    • Ran Out of Food in the Last Year: Never true   Transportation Needs: No Transportation Needs (5/16/2025)    PRAPARE - Transportation    • Lack of Transportation (Medical): No    • Lack of Transportation (Non-Medical): No   Physical Activity: Not on file   Stress: No Stress Concern Present (3/8/2025)    [employer] - Occupational Stress Questionnaire    • Feeling of Stress : Not at all   Social Connections: Not on file   Intimate Partner Violence: Not At Risk (5/16/2025)    Humiliation, Afraid, Rape, and Kick questionnaire    • Fear of Current or Ex-Partner: No    • Emotionally Abused: No    • Physically Abused: No    • Sexually Abused: No   Housing Stability: Low Risk  (5/16/2025)    Housing Stability Vital Sign    • Unable to Pay for Housing in the Last Year: No    • Number of Times Moved in the Last Year: 0    • Homeless in the Last Year: No     Medical History[2]   Family History[3]     Review of Systems   Constitutional:  Negative for chills, fatigue and fever.   HENT:  Negative for rhinorrhea and sore throat.    Eyes:  Negative for pain and redness.   Respiratory:  Negative for cough and shortness of breath.    Cardiovascular:  Negative for chest pain and palpitations.   Gastrointestinal:  Negative for abdominal pain and blood in stool.   Endocrine: Negative for polydipsia and polyuria.   Genitourinary:  Negative for dysuria and hematuria.   Musculoskeletal:  Negative for back pain and neck stiffness.   Skin:  Positive for wound. Negative for rash.   Allergic/Immunologic: Negative for environmental allergies and food allergies.   Neurological:  Positive for weakness. Negative for headaches.   Hematological:  Negative for adenopathy. Does not bruise/bleed easily.   Psychiatric/Behavioral:   Positive for confusion. Negative for hallucinations and suicidal ideas.       There were no vitals taken for this visit.  Physical Exam  Vitals reviewed.   Constitutional:       General: He is not in acute distress.     Appearance: He is not ill-appearing.   HENT:      Head: Normocephalic and atraumatic.      Right Ear: Tympanic membrane normal.      Left Ear: Tympanic membrane normal.      Nose: No congestion or rhinorrhea.      Mouth/Throat:      Pharynx: No oropharyngeal exudate or posterior oropharyngeal erythema.   Eyes:      Extraocular Movements: Extraocular movements intact.      Conjunctiva/sclera: Conjunctivae normal.      Pupils: Pupils are equal, round, and reactive to light.   Cardiovascular:      Rate and Rhythm: Normal rate and regular rhythm.      Heart sounds: No murmur heard.     No friction rub. No gallop.   Pulmonary:      Effort: Pulmonary effort is normal.      Breath sounds: Normal breath sounds. No wheezing, rhonchi or rales.   Abdominal:      General: There is no distension.      Palpations: Abdomen is soft.      Tenderness: There is no abdominal tenderness. There is no guarding or rebound.   Musculoskeletal:         General: No swelling or deformity.      Cervical back: Normal range of motion and neck supple.      Right lower leg: No edema.      Left lower leg: No edema.   Skin:     Capillary Refill: Capillary refill takes less than 2 seconds.      Coloration: Skin is not jaundiced.      Findings: No rash.      Comments: Left thigh wound c/d/i   Neurological:      General: No focal deficit present.      Mental Status: He is alert.      Motor: Weakness present.   Psychiatric:         Mood and Affect: Mood normal.         Behavior: Behavior normal.       Lab Results   Component Value Date    WBC 6.5 05/22/2025    HGB 10.3 (L) 05/22/2025    HCT 33.4 (L) 05/22/2025    MCV 91 05/22/2025     05/22/2025     Lab Results   Component Value Date    CHOL 61 (L) 08/05/2021    CHOL 102 (L)  "07/02/2019     Lab Results   Component Value Date    HDL 34 (L) 08/05/2021    HDL 36 (L) 07/02/2019     Lab Results   Component Value Date    LDLCALC 17 (L) 08/05/2021    LDLCALC 51 (L) 07/02/2019     Lab Results   Component Value Date    TRIG 50 08/05/2021    TRIG 74 07/02/2019     No components found for: \"CHOLHDL\"  Lab Results   Component Value Date    HGBA1C 5.2 08/02/2024       Assessment/Plan  Problem List Items Addressed This Visit       Closed left hip fracture, initial encounter - Primary    Stage 3b chronic kidney disease (Multi)    Dementia with behavioral disturbance (Multi)    Benign prostatic hyperplasia with urinary obstruction    Open wound of left hip         Electronically Signed By: Greg Melendez MD   6/5/25  2:58 PM       [1]  Past Surgical History:  Procedure Laterality Date   • CT GUIDED IMAGING FOR NEEDLE PLACEMENT  12/7/2017    CT GUIDED IMAGING FOR NEEDLE PLACEMENT [facility]   [2]  Past Medical History:  Diagnosis Date   • Gastrointestinal hemorrhage 03/08/2025   [3]  No family history on file.  "

## 2025-06-03 NOTE — PROGRESS NOTES
*Provider Impression*    Patient is an 84 year old male who is seen today for management of multiple medical problems       #Weakness / L femoral neck fracture - s/p L hemiarthroplasty on 3/9; PT/OT, oxycodone 5mg q4h PRN, heparin 5000units TID until 6/21, f/u w/ ortho Dr. Pantoja on 5/30  #UTI / BPH - Zosyn 3.375grams q6h until 6/1, doxycycline 100mg BID, tamsulosin 0.4mg daily, finasteride 5mg daily, check CBC w/ diff, CMP in am  #HTN / HLD - atorvastatin 40mg QHS  #GERD / Constipation - senna-S 8.6/50mg daily, pantoprazole 40mg daily  #Anxiety / Depression - trazodone 50mg QHS, fluoxetine 10mg daily, add buspar 5mg TID  #ACP - Full Code  Follow up as needed      *Chief Complaint*   UTI      *History of Present Illness*    Patient is an 83 y/o male w/ PMH as below who was admitted with a complaint of change in mental status.  The patient was found to have acute renal failure w/ UTI bacteremia with S epidermidis / capitis, and E faecalis. He was treated with IV antibiotics including ampicillin and vancomycin.  Local wound care was done.  He was given IV fluid with improvement. He was seen by PT/OT who recommended SNF. Once stable, he was d/c to [facility] on 5/22.    He had to be sent out to ED after he pulled his PICC line. Repalced w/ IV and wrapped with Coban and he was sent back to the facility.     Per nursing staff he has been more agitated and combative with care.    He is seen lying in bed. He is an unreliable historian. VS pending at this time.    (Addendum: overnight secure communication from the nurse that she felt he needed to be sent to the ED. Does not appear that this was addressed with the on call. DON had responded to the thread indicating improvement after receiving analgesics this morning.  I had ordered labs for the am on 5/29.  However, based on nurse's notes / orders these labs were not drawn and we rescheduled for 5/30. Unclear who ordered this change. On 5/30 there is a note indicating that  "patient was out at his follow up with ortho so missed lab draw again. There was a note indicating \"NP aware\" and new orders to check labs on 6/2. Clarify this with the nurse in question, who reports that she had spoken with NP Johann Ponce about the missed labs on 5/30 and obtained order from her, however, order was placed under my name and entered for signature by Dr. Melendez. After discussion with her, she clarified the documentation / orders in the record.  Total time spent 5/28, 5/29, 5/31 was approximately 55 minutes in counseling and/or coordination of care)    Allergies - NKA  PMH - HTN, HLD, CHF, CAD, PUD, CKD3, BPH, Anemia, L femoral neck fx, dementia, PNA, COPD  PSH - L hip hemiarthroplasty, open cholecystectomy  FH - none reported  SocHx - Former smoker, No EtOH    *Review of Systems*  All other systems reviewed are negative except as noted in the HPI     *Vital Signs*   Date: 5/27/25  - T: 97.2  P: 65  R: 18  BP: 127/73  SpO2:      *Results / Data*  CBC - Date: 5/23/25  WBC: 5.50  HGB: 10.3  HCT: 32.9  PLT: 214  ;   BMP - Date: 5/23/25  Na: 139  K: 3.8  Cl: 105  CO2: 21  BUN: 16  Cr: 1.49  Glu: 80  Ca: 8.8  ;   LFT - Date: 5/23/25  AST: 18  ALT: 9  ALP: 100  Tbili: 0.4  ;   Coags - Date:   INR:   PT:    *Physical Exam*  Gen: (+) NAD, (+) well-appearing  HEENT: (+) normocephalic, (+) MMM  Neck: (+) supple  Lungs: (+) CTAB, (-) wheezes, (-) rales, (-) rhonchi  Heart: (+) RRR, (+) S1 S2, (-) murmurs  Pulses: (+) palpable  Abd: (+) soft, (+) NT, (+) ND, (+) BS+  Ext: (-) edema, (-) deformity  MSK: (-) joint swelling  Skin: (+) warm, (+) dry, (-) rash  Neuro: (+) follows commands, (-) tremor, (+) alert  "

## 2025-06-04 ENCOUNTER — NURSING HOME VISIT (OUTPATIENT)
Dept: POST ACUTE CARE | Facility: EXTERNAL LOCATION | Age: 85
End: 2025-06-04
Payer: MEDICARE

## 2025-06-04 DIAGNOSIS — N40.0 BENIGN PROSTATIC HYPERPLASIA, UNSPECIFIED WHETHER LOWER URINARY TRACT SYMPTOMS PRESENT: ICD-10-CM

## 2025-06-04 DIAGNOSIS — S72.002S CLOSED FRACTURE OF NECK OF LEFT FEMUR, SEQUELA: ICD-10-CM

## 2025-06-04 DIAGNOSIS — M62.81 MUSCLE WEAKNESS (GENERALIZED): Primary | ICD-10-CM

## 2025-06-04 DIAGNOSIS — F32.A DEPRESSION, UNSPECIFIED DEPRESSION TYPE: ICD-10-CM

## 2025-06-04 DIAGNOSIS — N30.00 ACUTE CYSTITIS WITHOUT HEMATURIA: ICD-10-CM

## 2025-06-04 DIAGNOSIS — E78.5 DYSLIPIDEMIA: ICD-10-CM

## 2025-06-04 DIAGNOSIS — I10 ESSENTIAL HYPERTENSION: ICD-10-CM

## 2025-06-04 DIAGNOSIS — F41.9 ANXIETY: ICD-10-CM

## 2025-06-04 DIAGNOSIS — K59.00 CONSTIPATION, UNSPECIFIED CONSTIPATION TYPE: ICD-10-CM

## 2025-06-04 DIAGNOSIS — K21.9 GASTROESOPHAGEAL REFLUX DISEASE, UNSPECIFIED WHETHER ESOPHAGITIS PRESENT: ICD-10-CM

## 2025-06-04 PROCEDURE — 99309 SBSQ NF CARE MODERATE MDM 30: CPT | Performed by: NURSE PRACTITIONER

## 2025-06-05 ENCOUNTER — TELEPHONE (OUTPATIENT)
Dept: ORTHOPEDIC SURGERY | Facility: CLINIC | Age: 85
End: 2025-06-05
Payer: MEDICARE

## 2025-06-05 ASSESSMENT — ENCOUNTER SYMPTOMS
WOUND: 1
HALLUCINATIONS: 0
PALPITATIONS: 0
CONFUSION: 1
RHINORRHEA: 0
FATIGUE: 0
CHILLS: 0
ADENOPATHY: 0
HEADACHES: 0
DYSURIA: 0
COUGH: 0
BACK PAIN: 0
FEVER: 0
EYE REDNESS: 0
POLYDIPSIA: 0
SHORTNESS OF BREATH: 0
NECK STIFFNESS: 0
WEAKNESS: 1
BLOOD IN STOOL: 0
ABDOMINAL PAIN: 0
EYE PAIN: 0
BRUISES/BLEEDS EASILY: 0
SORE THROAT: 0
HEMATURIA: 0

## 2025-06-05 NOTE — TELEPHONE ENCOUNTER
Johann Burger, [facility]    Ph# [phone]    5,000 Units Heparin 3x daily SubQ.    NP, Johann, called regarding Heparin order that started on 5/22.  Patient LT Hip Hemiarthroplasty on 3/9.  Patient is combative  and falling, climbing out of bed. Etc. And Nurse Practitioner hs concerns that he might bleed.  The order stated it cannot be discontinued without ORTHO provider order.  Order placed by PCP Rachel.    Please Call.

## 2025-06-05 NOTE — PROGRESS NOTES
Jabari Dumas is a 84 y.o. male with Chief Complaint of SNF ([facility]) H&P    Resident seen 25 -- MP    CC: SNF ([facility]) H&P    : 1940  SNF H&P done 25  DC Summary dated 25 reviewed 25  NKDA  Full Code      S: 83 yo man with CAD, hx CVA, PUD, recent left hip fx s/p HA (3/9/25) with infected surgical wound, and dementia admitted to SNF rehab after hospitalization for PCM, UTI treated with IV ABX and IVF. In ER 25 for replacement of IV access. No CP/SOB. Med list & problem list reviewed.     O: VSS AFEB 170# Awake, alert, NAD. Pleasantly confused.  Chest CTA. Heart RRR. Ext no c/c/e.  PEG intact.     LAB (25) Hgb 12.1, Alb 3, Na 146, K 3.6, Cr 1.36    A/P:  # Weakness: SNF PT/OT  # Dementia: ST  # Hx UTI: completed abx.  # PCM: TF per PEG, boost po.   # Femur fx s/p Left HA (3/9/25): f/u Dr Buckley, oxycodone prn  # Left thigh wound infection: continue doxycycline f/u PARADISE Montes De Oca.   # Dysphagia: Mech soft, thin.   # Hypokalemia: Start KCl 20 meq daily  # MDD w Anxiety: buspar, fluoxetine  # BPH with LUTS: finasteride, flomax  # GERD: PPI  # Insomnia: trazodone  # CAD: statin  # CKD 3b: monitor labs q Monday.       Surgical History[1]   Social History     Socioeconomic History    Marital status:      Spouse name: Not on file    Number of children: Not on file    Years of education: Not on file    Highest education level: Not on file   Occupational History    Not on file   Tobacco Use    Smoking status: Former     Types: Cigarettes    Smokeless tobacco: Not on file   Substance and Sexual Activity    Alcohol use: Not Currently    Drug use: Not on file    Sexual activity: Not on file   Other Topics Concern    Not on file   Social History Narrative    Not on file     Social Drivers of Health     Financial Resource Strain: Low Risk  (2025)    Overall Financial Resource Strain (CARDIA)     Difficulty of Paying Living Expenses: Not hard at all   Food Insecurity: No Food Insecurity  (5/16/2025)    Hunger Vital Sign     Worried About Running Out of Food in the Last Year: Never true     Ran Out of Food in the Last Year: Never true   Transportation Needs: No Transportation Needs (5/16/2025)    PRAPARE - Transportation     Lack of Transportation (Medical): No     Lack of Transportation (Non-Medical): No   Physical Activity: Not on file   Stress: No Stress Concern Present (3/8/2025)    [employer] - Occupational Stress Questionnaire     Feeling of Stress : Not at all   Social Connections: Not on file   Intimate Partner Violence: Not At Risk (5/16/2025)    Humiliation, Afraid, Rape, and Kick questionnaire     Fear of Current or Ex-Partner: No     Emotionally Abused: No     Physically Abused: No     Sexually Abused: No   Housing Stability: Low Risk  (5/16/2025)    Housing Stability Vital Sign     Unable to Pay for Housing in the Last Year: No     Number of Times Moved in the Last Year: 0     Homeless in the Last Year: No     Medical History[2]   Family History[3]     Review of Systems   Constitutional:  Negative for chills, fatigue and fever.   HENT:  Negative for rhinorrhea and sore throat.    Eyes:  Negative for pain and redness.   Respiratory:  Negative for cough and shortness of breath.    Cardiovascular:  Negative for chest pain and palpitations.   Gastrointestinal:  Negative for abdominal pain and blood in stool.   Endocrine: Negative for polydipsia and polyuria.   Genitourinary:  Negative for dysuria and hematuria.   Musculoskeletal:  Negative for back pain and neck stiffness.   Skin:  Positive for wound. Negative for rash.   Allergic/Immunologic: Negative for environmental allergies and food allergies.   Neurological:  Positive for weakness. Negative for headaches.   Hematological:  Negative for adenopathy. Does not bruise/bleed easily.   Psychiatric/Behavioral:  Positive for confusion. Negative for hallucinations and suicidal ideas.       There were no vitals  taken for this visit.  Physical Exam  Vitals reviewed.   Constitutional:       General: He is not in acute distress.     Appearance: He is not ill-appearing.   HENT:      Head: Normocephalic and atraumatic.      Right Ear: Tympanic membrane normal.      Left Ear: Tympanic membrane normal.      Nose: No congestion or rhinorrhea.      Mouth/Throat:      Pharynx: No oropharyngeal exudate or posterior oropharyngeal erythema.   Eyes:      Extraocular Movements: Extraocular movements intact.      Conjunctiva/sclera: Conjunctivae normal.      Pupils: Pupils are equal, round, and reactive to light.   Cardiovascular:      Rate and Rhythm: Normal rate and regular rhythm.      Heart sounds: No murmur heard.     No friction rub. No gallop.   Pulmonary:      Effort: Pulmonary effort is normal.      Breath sounds: Normal breath sounds. No wheezing, rhonchi or rales.   Abdominal:      General: There is no distension.      Palpations: Abdomen is soft.      Tenderness: There is no abdominal tenderness. There is no guarding or rebound.   Musculoskeletal:         General: No swelling or deformity.      Cervical back: Normal range of motion and neck supple.      Right lower leg: No edema.      Left lower leg: No edema.   Skin:     Capillary Refill: Capillary refill takes less than 2 seconds.      Coloration: Skin is not jaundiced.      Findings: No rash.      Comments: Left thigh wound c/d/i   Neurological:      General: No focal deficit present.      Mental Status: He is alert.      Motor: Weakness present.   Psychiatric:         Mood and Affect: Mood normal.         Behavior: Behavior normal.       Lab Results   Component Value Date    WBC 6.5 05/22/2025    HGB 10.3 (L) 05/22/2025    HCT 33.4 (L) 05/22/2025    MCV 91 05/22/2025     05/22/2025     Lab Results   Component Value Date    CHOL 61 (L) 08/05/2021    CHOL 102 (L) 07/02/2019     Lab Results   Component Value Date    HDL 34 (L) 08/05/2021    HDL 36 (L) 07/02/2019  "    Lab Results   Component Value Date    LDLCALC 17 (L) 08/05/2021    LDLCALC 51 (L) 07/02/2019     Lab Results   Component Value Date    TRIG 50 08/05/2021    TRIG 74 07/02/2019     No components found for: \"CHOLHDL\"  Lab Results   Component Value Date    HGBA1C 5.2 08/02/2024       Assessment/Plan   Problem List Items Addressed This Visit       Closed left hip fracture, initial encounter - Primary    Stage 3b chronic kidney disease (Multi)    Dementia with behavioral disturbance (Multi)    Benign prostatic hyperplasia with urinary obstruction    Open wound of left hip            [1]   Past Surgical History:  Procedure Laterality Date    CT GUIDED IMAGING FOR NEEDLE PLACEMENT  12/7/2017    CT GUIDED IMAGING FOR NEEDLE PLACEMENT [facility]   [2]   Past Medical History:  Diagnosis Date    Gastrointestinal hemorrhage 03/08/2025   [3] No family history on file.    "

## 2025-06-06 NOTE — TELEPHONE ENCOUNTER
I called Johann Burger NP, and gave her Dr. Buckley's reply to her message about Jabari Dumas: OK to come off Heparin from our perspective.    She was appreciative that we got back to her.

## 2025-06-09 ENCOUNTER — NURSING HOME VISIT (OUTPATIENT)
Dept: POST ACUTE CARE | Facility: EXTERNAL LOCATION | Age: 85
End: 2025-06-09
Payer: MEDICARE

## 2025-06-09 DIAGNOSIS — N13.8 BENIGN PROSTATIC HYPERPLASIA WITH URINARY OBSTRUCTION: ICD-10-CM

## 2025-06-09 DIAGNOSIS — I10 BENIGN HYPERTENSION: ICD-10-CM

## 2025-06-09 DIAGNOSIS — S72.002S CLOSED FRACTURE OF NECK OF LEFT FEMUR, SEQUELA: Primary | ICD-10-CM

## 2025-06-09 DIAGNOSIS — N40.1 BENIGN PROSTATIC HYPERPLASIA WITH URINARY OBSTRUCTION: ICD-10-CM

## 2025-06-09 NOTE — LETTER
Patient: Jabari Dumas  : 1940    Encounter Date: 2025    Resident seen 25 -- MP    CC: SNF ([facility]) H&P    : 1940  SNF H&P done 25  DC Summary dated 25 reviewed 25  NKDA  Full Code    S: 83 yo man with CAD, hx CVA, PUD, recent left hip fx s/p HA (3/9/25) with infected surgical wound, and dementia admitted to SNF rehab after hospitalization for PCM, UTI treated with IV ABX and IVF. In ER 25 for replacement of IV access. No CP/SOB. Med list & problem list reviewed.    O: VSS AFEB 170# Awake, alert, NAD. Pleasantly confused. Chest CTA. Heart RRR. Ext no c/c/e. PEG intact.    LAB (25) Hgb 12.8, Alb 2.9, Na 138, K 4.4, Cr 1.22, GFR 58    A/P:  # Weakness: SNF PT/OT  # Dementia: ST  # Hx UTI: completed abx.  # PCM: TF per PEG, boost po.  # Femur fx s/p Left HA (3/9/25): f/u Dr Buckley, oxycodone prn  # Left thigh wound infection: continue doxycycline f/u ID Falguni.  # Dysphagia: Mech soft, thin.  # Hypokalemia: Start KCl 20 meq daily  # MDD w Anxiety: buspar, fluoxetine  # BPH with LUTS: finasteride, flomax  # GERD: PPI  # Insomnia: trazodone  # CAD: statin  # CKD 3b: monitor labs q Monday.    Electronically Signed By: Greg Melendez MD   25  6:32 PM

## 2025-06-11 ENCOUNTER — NURSING HOME VISIT (OUTPATIENT)
Dept: POST ACUTE CARE | Facility: EXTERNAL LOCATION | Age: 85
End: 2025-06-11
Payer: MEDICARE

## 2025-06-11 DIAGNOSIS — K21.9 GASTROESOPHAGEAL REFLUX DISEASE, UNSPECIFIED WHETHER ESOPHAGITIS PRESENT: ICD-10-CM

## 2025-06-11 DIAGNOSIS — F32.A DEPRESSION, UNSPECIFIED DEPRESSION TYPE: ICD-10-CM

## 2025-06-11 DIAGNOSIS — N40.0 BENIGN PROSTATIC HYPERPLASIA, UNSPECIFIED WHETHER LOWER URINARY TRACT SYMPTOMS PRESENT: ICD-10-CM

## 2025-06-11 DIAGNOSIS — E78.5 DYSLIPIDEMIA: ICD-10-CM

## 2025-06-11 DIAGNOSIS — N30.00 ACUTE CYSTITIS WITHOUT HEMATURIA: ICD-10-CM

## 2025-06-11 DIAGNOSIS — I10 ESSENTIAL HYPERTENSION: ICD-10-CM

## 2025-06-11 DIAGNOSIS — K59.00 CONSTIPATION, UNSPECIFIED CONSTIPATION TYPE: ICD-10-CM

## 2025-06-11 DIAGNOSIS — S72.002S CLOSED FRACTURE OF NECK OF LEFT FEMUR, SEQUELA: ICD-10-CM

## 2025-06-11 DIAGNOSIS — M62.81 MUSCLE WEAKNESS (GENERALIZED): Primary | ICD-10-CM

## 2025-06-11 DIAGNOSIS — F41.9 ANXIETY: ICD-10-CM

## 2025-06-11 PROCEDURE — 99309 SBSQ NF CARE MODERATE MDM 30: CPT | Performed by: NURSE PRACTITIONER

## 2025-06-11 NOTE — PROGRESS NOTES
*Provider Impression*    Patient is an 84 year old male who is seen today for management of multiple medical problems       #Weakness / L femoral neck fracture - s/p L hemiarthroplasty on 3/9; PT/OT, oxycodone 5mg q4h PRN, heparin 5000units TID until 6/21, f/u w/ ortho Dr. Pantoja  #UTI / BPH -  doxycycline 100mg BID, tamsulosin 0.4mg daily, finasteride 5mg daily  #HTN / HLD - atorvastatin 40mg QHS  #GERD / Constipation - senna-S 8.6/50mg daily, pantoprazole 40mg daily  #Anxiety / Depression - trazodone 50mg QHS, fluoxetine 10mg daily, add buspar 5mg TID  #ACP - Full Code  Follow up as needed      *Chief Complaint*   UTI      *History of Present Illness*    Patient is an 85 y/o male w/ PMH as below who was admitted with a complaint of change in mental status.  The patient was found to have acute renal failure w/ UTI bacteremia with S epidermidis / capitis, and E faecalis. He was treated with IV antibiotics including ampicillin and vancomycin.  Local wound care was done.  He was given IV fluid with improvement. He was seen by PT/OT who recommended SNF. Once stable, he was d/c to [facility] on 5/22.    Labs appreciated today as below. He completed zosyn.  Still on Heparin - nursing staff also requesting clarification on stop date - upon review his heparin set to d/c on 6/21.     He is seen lying in bed and appears in NAD, but unreliable historian    Allergies - NKA  PMH - HTN, HLD, CHF, CAD, PUD, CKD3, BPH, Anemia, L femoral neck fx, dementia, PNA, COPD  PSH - L hip hemiarthroplasty, open cholecystectomy  FH - none reported  SocHx - Former smoker, No EtOH    *Review of Systems*  All other systems reviewed are negative except as noted in the HPI     *Vital Signs*   Date: 6/3/25  - T: 98.6  P: 84  R: 20  BP:   SpO2: 93%      *Results / Data*  CBC - Date: 5/23/25  WBC: 5.50  HGB: 10.3  HCT: 32.9  PLT: 214  ;   BMP - Date: 5/23/25  Na: 139  K: 3.8  Cl: 105  CO2: 21  BUN: 16  Cr: 1.49  Glu: 80  Ca: 8.8  ;   LFT - Date:  5/23/25  AST: 18  ALT: 9  ALP: 100  Tbili: 0.4  ;   Coags - Date:   INR:   PT:    *Physical Exam*  Gen: (+) NAD, (+) well-appearing  HEENT: (+) normocephalic, (+) MMM  Neck: (+) supple  Lungs: (+) CTAB, (-) wheezes, (-) rales, (-) rhonchi  Heart: (+) RRR, (+) S1 S2, (-) murmurs  Pulses: (+) palpable  Abd: (+) soft, (+) NT, (+) ND, (+) BS+  Ext: (-) edema, (-) deformity, (+) RUE mass  MSK: (-) joint swelling  Skin: (+) warm, (+) dry, (-) rash  Neuro: (+) follows commands, (-) tremor, (+) alert

## 2025-06-12 NOTE — PROGRESS NOTES
Resident seen 25 -- MP    CC: SNF ([facility]) H&P    : 1940  SNF H&P done 25  DC Summary dated 25 reviewed 25  NKDA  Full Code    S: 85 yo man with CAD, hx CVA, PUD, recent left hip fx s/p HA (3/9/25) with infected surgical wound, and dementia admitted to SNF rehab after hospitalization for PCM, UTI treated with IV ABX and IVF. In ER 25 for replacement of IV access. No CP/SOB. Med list & problem list reviewed.    O: VSS AFEB 170# Awake, alert, NAD. Pleasantly confused. Chest CTA. Heart RRR. Ext no c/c/e. PEG intact.    LAB (25) Hgb 12.8, Alb 2.9, Na 138, K 4.4, Cr 1.22, GFR 58    A/P:  # Weakness: SNF PT/OT  # Dementia: ST  # Hx UTI: completed abx.  # PCM: TF per PEG, boost po.  # Femur fx s/p Left HA (3/9/25): f/u Dr Buckley, oxycodone prn  # Left thigh wound infection: continue doxycycline f/u PARADISE Montes De Oca.  # Dysphagia: Mech soft, thin.  # Hypokalemia: Start KCl 20 meq daily  # MDD w Anxiety: buspar, fluoxetine  # BPH with LUTS: finasteride, flomax  # GERD: PPI  # Insomnia: trazodone  # CAD: statin  # CKD 3b: monitor labs q Monday.

## 2025-06-16 NOTE — PROGRESS NOTES
*Provider Impression*    Patient is an 84 year old male who is seen today for management of multiple medical problems       #Weakness / L femoral neck fracture - s/p L hemiarthroplasty on 3/9; PT/OT, oxycodone 5mg q4h PRN, heparin 5000units TID until 6/21, f/u w/ ortho Dr. Pantoja  #UTI / BPH -  doxycycline 100mg BID, tamsulosin 0.4mg daily, finasteride 5mg daily  #HTN / HLD - atorvastatin 40mg QHS  #GERD / Constipation - senna-S 8.6/50mg daily, pantoprazole 40mg daily  #Anxiety / Depression - trazodone 50mg QHS, fluoxetine 10mg daily, buspar 5mg TID, hydroxyzine 25mg q8h PRN  #ACP - Full Code  Follow up as needed      *Chief Complaint*   UTI      *History of Present Illness*    Patient is an 83 y/o male w/ PMH as below who was admitted with a complaint of change in mental status.  The patient was found to have acute renal failure w/ UTI bacteremia with S epidermidis / capitis, and E faecalis. He was treated with IV antibiotics including ampicillin and vancomycin.  Local wound care was done.  He was given IV fluid with improvement. He was seen by PT/OT who recommended SNF. Once stable, he was d/c to [facility] on 5/22.    He is seen lying in bed today with his brother at the bedside.     He denies any c/o, but unreliable historian    Allergies - NKA  PMH - HTN, HLD, CHF, CAD, PUD, CKD3, BPH, Anemia, L femoral neck fx, dementia, PNA, COPD  PSH - L hip hemiarthroplasty, open cholecystectomy  FH - none reported  SocHx - Former smoker, No EtOH    *Review of Systems*  All other systems reviewed are negative except as noted in the HPI     *Vital Signs*   Date: 6/10/25  - T: 97.2  P: 60  R: 22  BP: 122/61  SpO2: 94% ; Date: 6/9/25  Date: 155.8    *Results / Data*  CBC - Date: 6/9/25  WBC: 7.17  HGB: 12.8  HCT: 41.1  PLT: 226  ;   BMP - Date: 6/9/25  Na: 138  K: 4.4  Cl: 104  CO2: 21  BUN: 17  Cr: 1.22  Glu: 60  Ca: 9.3  ;   LFT - Date: 6/9/25  AST: 43  ALT: 33  ALP: 149  Tbili: 0.5  ;   Coags - Date:   INR:    PT:    *Physical Exam*  Gen: (+) NAD, (+) well-appearing  HEENT: (+) normocephalic, (+) MMM  Neck: (+) supple  Lungs: (+) CTAB, (-) wheezes, (-) rales, (-) rhonchi  Heart: (+) RRR, (+) S1 S2, (-) murmurs  Pulses: (+) palpable  Abd: (+) soft, (+) NT, (+) ND, (+) BS+  Ext: (-) edema, (-) deformity, (+) RUE mass  MSK: (-) joint swelling  Skin: (+) warm, (+) dry, (-) rash  Neuro: (+) follows commands, (-) tremor, (+) alert

## 2025-06-23 ENCOUNTER — NURSING HOME VISIT (OUTPATIENT)
Dept: POST ACUTE CARE | Facility: EXTERNAL LOCATION | Age: 85
End: 2025-06-23
Payer: MEDICARE

## 2025-06-23 DIAGNOSIS — F03.918 DEMENTIA WITH BEHAVIORAL DISTURBANCE (MULTI): ICD-10-CM

## 2025-06-23 DIAGNOSIS — N40.1 BENIGN PROSTATIC HYPERPLASIA WITH URINARY OBSTRUCTION: ICD-10-CM

## 2025-06-23 DIAGNOSIS — N18.32 STAGE 3B CHRONIC KIDNEY DISEASE (MULTI): Primary | ICD-10-CM

## 2025-06-23 DIAGNOSIS — N13.8 BENIGN PROSTATIC HYPERPLASIA WITH URINARY OBSTRUCTION: ICD-10-CM

## 2025-06-23 PROCEDURE — 99310 SBSQ NF CARE HIGH MDM 45: CPT | Performed by: FAMILY MEDICINE

## 2025-06-23 NOTE — LETTER
Patient: Jabari Dumas  : 1940    Encounter Date: 2025    Resident seen 25 -- MP    CC: SNF ([facility]) recheck    : 1940  SNF H&P done 25  DC Summary dated 25 reviewed 25  NKDA  Full Code    S: 83 yo man with CAD, hx CVA, PUD, recent left hip fx s/p HA (3/9/25) with infected surgical wound, and dementia.  Over weekend with increased confusion, anxiety, decreased po intake and wheezing. No CP/SOB. Med list & problem list reviewed.    O: VSS AFEB 155# (down 15#) Awake, disoriented, restless and uncomfortable, unable to express source or site of pain. Chest CTA. Heart RRR. Ext no c/c/e.  Thigh incision re-epithelialized.    LAB (25) Hgb 15.2, Alb 2.7, Na 148, K 4.7, Cr 1.93    A/P:  # Weakness: completed SNF PT/OT. Appropraite of LTC.  # Dementia with terminal decline exhibiting signs of terminal restlessness: Roxanol started for comfort.  Recommendation made for hospice.  Alternative would require placement of IV for fluids and PEG placement.   # PCM: boost po.  # Femur fx s/p Left HA (3/9/25): f/u Dr Buckley, oxycodone prn  # Left thigh wound infection: continue doxycycline f/u ID Ogunlesi.  # Dysphagia: Mech soft, thin.  # Hypokalemia: KCl 20 meq daily  # MDD w Anxiety: buspar, fluoxetine  # BPH with LUTS: finasteride, flomax  # GERD: PPI  # Insomnia: trazodone  # CAD: statin  # CKD 3b: monitor labs q Monday.    Addendum: after initial orders and labs were reviewed as documented above, patient was found without vital signs at 2:42 pm.    Electronically Signed By: Greg Melendez MD   25  5:25 PM

## 2025-06-23 NOTE — PROGRESS NOTES
Resident seen 25 -- MP    CC: SNF ([facility]) recheck    : 1940  SNF H&P done 25  DC Summary dated 25 reviewed 25  NKDA  Full Code    S: 83 yo man with CAD, hx CVA, PUD, recent left hip fx s/p HA (3/9/25) with infected surgical wound, and dementia.  Over weekend with increased confusion, anxiety, decreased po intake and wheezing. No CP/SOB. Med list & problem list reviewed.    O: VSS AFEB 155# (down 15#) Awake, disoriented, restless and uncomfortable, unable to express source or site of pain. Chest CTA. Heart RRR. Ext no c/c/e.  Thigh incision re-epithelialized.    LAB (25) Hgb 15.2, Alb 2.7, Na 148, K 4.7, Cr 1.93    A/P:  # Weakness: completed SNF PT/OT. Appropraite of LTC.  # Dementia with terminal decline exhibiting signs of terminal restlessness: Roxanol started for comfort.  Recommendation made for hospice.  Alternative would require placement of IV for fluids and PEG placement.   # PCM: boost po.  # Femur fx s/p Left HA (3/9/25): f/u Dr Buckley, oxycodone prn  # Left thigh wound infection: continue doxycycline f/u ID Falguni.  # Dysphagia: Mech soft, thin.  # Hypokalemia: KCl 20 meq daily  # MDD w Anxiety: buspar, fluoxetine  # BPH with LUTS: finasteride, flomax  # GERD: PPI  # Insomnia: trazodone  # CAD: statin  # CKD 3b: monitor labs q Monday.    Addendum: after initial orders and labs were reviewed as documented above, patient was found without vital signs at 2:42 pm.

## (undated) DEVICE — NEEDLE, SPINAL, QUINCKE, 18 G X 3.5 IN, PINK HUB

## (undated) DEVICE — SUTURE, QUILL, BARBED, PDO, 2, 24 X 24CM, T8 36MM TAPER POINT, 1/2 CIRCLE

## (undated) DEVICE — HOOD, SURGICAL, FLYTE, T7 PLUS, PEEL AWAY SHIELD

## (undated) DEVICE — KIT, MINOR, DOUBLE BASIN

## (undated) DEVICE — SOLUTION, IRRIGATION, USP, SODIUM CHLORIDE 0.9%, 3000 ML, BAG

## (undated) DEVICE — CLOSURE SYSTEM, DERMABOND, PRINEO, 22CM, STERILE

## (undated) DEVICE — TIP, SUCTION, YANKAUER, FLEXIBLE

## (undated) DEVICE — SUTURE, CTD, VICRYL, 2-0, UND, BR, CT-2

## (undated) DEVICE — IRRIGATION SYSTEM, WOUND, PULSAVAC PLUS

## (undated) DEVICE — SUTURE, V-LOC, 4-0, 12 IN, P-12, 90 ABS

## (undated) DEVICE — SUTURE, ETHIBOND, 2, 30 IN, CT2, GREEN

## (undated) DEVICE — COVER, MAYO STAND, W/PAD, 23 IN, DISPOSABLE, PLASTIC, LF, STERILE

## (undated) DEVICE — SEALER, BIPOLAR, AQUA MANTYS 6.0

## (undated) DEVICE — DRAPE, INCISE, ANTIMICROBIAL, IOBAN 2, STERI DRAPE, 23 X 33 IN, DISPOSABLE, STERILE

## (undated) DEVICE — DRESSING, MEPILEX BORDER, POST-OP AG, 4 X 10 IN

## (undated) DEVICE — DRAPE, SHEET, UTILITY, NON ABSORBENT, 18 X 26 IN, LF

## (undated) DEVICE — DRAPE PACK, TOTAL HIP, CUSTOM, GEAUGA

## (undated) DEVICE — BLADE, OSCILLATOR 19.5 X 86 X 1.27MM

## (undated) DEVICE — IRRIGATION SYSTEM, WOUND, SURGIPHOR, 450ML, STERILE

## (undated) DEVICE — SUTURE, ETHIBOND XTRA, 5 CCS, GRN/BR, LF

## (undated) DEVICE — PREP KIT, INTRAMEDULLARY, TOTAL HIP

## (undated) DEVICE — SUTURE, VICRYL, 1, 24 IN, CTD, UNDYED

## (undated) DEVICE — COVER, TABLE, 44X90